# Patient Record
Sex: FEMALE | Race: ASIAN | NOT HISPANIC OR LATINO | Employment: OTHER | URBAN - METROPOLITAN AREA
[De-identification: names, ages, dates, MRNs, and addresses within clinical notes are randomized per-mention and may not be internally consistent; named-entity substitution may affect disease eponyms.]

---

## 2017-05-12 ENCOUNTER — TRANSCRIBE ORDERS (OUTPATIENT)
Dept: LAB | Facility: HOSPITAL | Age: 77
End: 2017-05-12

## 2017-05-12 ENCOUNTER — APPOINTMENT (OUTPATIENT)
Dept: LAB | Facility: HOSPITAL | Age: 77
End: 2017-05-12
Attending: INTERNAL MEDICINE
Payer: MEDICARE

## 2017-05-12 DIAGNOSIS — E55.9 UNSPECIFIED VITAMIN D DEFICIENCY: ICD-10-CM

## 2017-05-12 DIAGNOSIS — I10 ESSENTIAL HYPERTENSION, BENIGN: Primary | ICD-10-CM

## 2017-05-12 DIAGNOSIS — D51.9 ANEMIA DUE TO VITAMIN B12 DEFICIENCY, UNSPECIFIED B12 DEFICIENCY TYPE: ICD-10-CM

## 2017-05-12 DIAGNOSIS — E03.9 UNSPECIFIED HYPOTHYROIDISM: ICD-10-CM

## 2017-05-12 DIAGNOSIS — I10 ESSENTIAL HYPERTENSION, BENIGN: ICD-10-CM

## 2017-05-12 DIAGNOSIS — E78.5 OTHER AND UNSPECIFIED HYPERLIPIDEMIA: ICD-10-CM

## 2017-05-12 DIAGNOSIS — Z00.00 ROUTINE GENERAL MEDICAL EXAMINATION AT A HEALTH CARE FACILITY: ICD-10-CM

## 2017-05-12 LAB
ALBUMIN SERPL BCP-MCNC: 3.5 G/DL (ref 3.5–5)
ALP SERPL-CCNC: 69 U/L (ref 46–116)
ALT SERPL W P-5'-P-CCNC: 18 U/L (ref 12–78)
ANION GAP SERPL CALCULATED.3IONS-SCNC: 7 MMOL/L (ref 4–13)
AST SERPL W P-5'-P-CCNC: 10 U/L (ref 5–45)
BACTERIA UR QL AUTO: NORMAL /HPF
BASOPHILS # BLD AUTO: 0.05 THOUSANDS/ΜL (ref 0–0.1)
BASOPHILS NFR BLD AUTO: 1 % (ref 0–1)
BILIRUB SERPL-MCNC: 0.4 MG/DL (ref 0.2–1)
BILIRUB UR QL STRIP: NEGATIVE
BUN SERPL-MCNC: 13 MG/DL (ref 5–25)
CALCIUM SERPL-MCNC: 8.8 MG/DL (ref 8.3–10.1)
CHLORIDE SERPL-SCNC: 107 MMOL/L (ref 100–108)
CHOLEST SERPL-MCNC: 256 MG/DL (ref 50–200)
CLARITY UR: CLEAR
CO2 SERPL-SCNC: 26 MMOL/L (ref 21–32)
COLOR UR: YELLOW
CREAT SERPL-MCNC: 0.72 MG/DL (ref 0.6–1.3)
EOSINOPHIL # BLD AUTO: 0.12 THOUSAND/ΜL (ref 0–0.61)
EOSINOPHIL NFR BLD AUTO: 3 % (ref 0–6)
ERYTHROCYTE [DISTWIDTH] IN BLOOD BY AUTOMATED COUNT: 13 % (ref 11.6–15.1)
GFR SERPL CREATININE-BSD FRML MDRD: >60 ML/MIN/1.73SQ M
GLUCOSE P FAST SERPL-MCNC: 108 MG/DL (ref 65–99)
GLUCOSE UR STRIP-MCNC: NEGATIVE MG/DL
HCT VFR BLD AUTO: 39.6 % (ref 34.8–46.1)
HDLC SERPL-MCNC: 67 MG/DL (ref 40–60)
HGB BLD-MCNC: 13.1 G/DL (ref 11.5–15.4)
HGB UR QL STRIP.AUTO: ABNORMAL
HYALINE CASTS #/AREA URNS LPF: NORMAL /LPF
KETONES UR STRIP-MCNC: NEGATIVE MG/DL
LDLC SERPL CALC-MCNC: 152 MG/DL (ref 0–100)
LEUKOCYTE ESTERASE UR QL STRIP: NEGATIVE
LYMPHOCYTES # BLD AUTO: 1.99 THOUSANDS/ΜL (ref 0.6–4.47)
LYMPHOCYTES NFR BLD AUTO: 44 % (ref 14–44)
MCH RBC QN AUTO: 32.3 PG (ref 26.8–34.3)
MCHC RBC AUTO-ENTMCNC: 33.1 G/DL (ref 31.4–37.4)
MCV RBC AUTO: 98 FL (ref 82–98)
MONOCYTES # BLD AUTO: 0.25 THOUSAND/ΜL (ref 0.17–1.22)
MONOCYTES NFR BLD AUTO: 6 % (ref 4–12)
NEUTROPHILS # BLD AUTO: 2.14 THOUSANDS/ΜL (ref 1.85–7.62)
NEUTS SEG NFR BLD AUTO: 46 % (ref 43–75)
NITRITE UR QL STRIP: NEGATIVE
NON-SQ EPI CELLS URNS QL MICRO: NORMAL /HPF
NRBC BLD AUTO-RTO: 0 /100 WBCS
PH UR STRIP.AUTO: 6 [PH] (ref 4.5–8)
PLATELET # BLD AUTO: 227 THOUSANDS/UL (ref 149–390)
PMV BLD AUTO: 10.1 FL (ref 8.9–12.7)
POTASSIUM SERPL-SCNC: 4.3 MMOL/L (ref 3.5–5.3)
PROT SERPL-MCNC: 7.4 G/DL (ref 6.4–8.2)
PROT UR STRIP-MCNC: NEGATIVE MG/DL
RBC # BLD AUTO: 4.06 MILLION/UL (ref 3.81–5.12)
RBC #/AREA URNS AUTO: NORMAL /HPF
SODIUM SERPL-SCNC: 140 MMOL/L (ref 136–145)
SP GR UR STRIP.AUTO: 1.01 (ref 1–1.03)
T4 FREE SERPL-MCNC: 1.13 NG/DL (ref 0.76–1.46)
TRIGL SERPL-MCNC: 186 MG/DL
TSH SERPL DL<=0.05 MIU/L-ACNC: 2.11 UIU/ML (ref 0.36–3.74)
UROBILINOGEN UR QL STRIP.AUTO: 0.2 E.U./DL
WBC # BLD AUTO: 4.56 THOUSAND/UL (ref 4.31–10.16)
WBC #/AREA URNS AUTO: NORMAL /HPF

## 2017-05-12 PROCEDURE — 36415 COLL VENOUS BLD VENIPUNCTURE: CPT

## 2017-05-12 PROCEDURE — 81001 URINALYSIS AUTO W/SCOPE: CPT | Performed by: INTERNAL MEDICINE

## 2017-05-12 PROCEDURE — 80061 LIPID PANEL: CPT

## 2017-05-12 PROCEDURE — 84443 ASSAY THYROID STIM HORMONE: CPT

## 2017-05-12 PROCEDURE — 80053 COMPREHEN METABOLIC PANEL: CPT

## 2017-05-12 PROCEDURE — 85025 COMPLETE CBC W/AUTO DIFF WBC: CPT

## 2017-05-12 PROCEDURE — 84439 ASSAY OF FREE THYROXINE: CPT

## 2017-08-10 ENCOUNTER — TRANSCRIBE ORDERS (OUTPATIENT)
Dept: ADMINISTRATIVE | Facility: HOSPITAL | Age: 77
End: 2017-08-10

## 2017-08-10 DIAGNOSIS — Z00.00 ROUTINE ADULT HEALTH MAINTENANCE: Primary | ICD-10-CM

## 2017-09-13 ENCOUNTER — HOSPITAL ENCOUNTER (OUTPATIENT)
Dept: RADIOLOGY | Facility: HOSPITAL | Age: 77
Discharge: HOME | End: 2017-09-13
Attending: INTERNAL MEDICINE
Payer: MEDICARE

## 2017-09-13 DIAGNOSIS — Z00.00 ROUTINE ADULT HEALTH MAINTENANCE: ICD-10-CM

## 2017-09-13 PROCEDURE — G0202 SCR MAMMO BI INCL CAD: HCPCS

## 2018-01-19 ENCOUNTER — APPOINTMENT (OUTPATIENT)
Dept: NON INVASIVE DIAGNOSTICS | Facility: HOSPITAL | Age: 78
End: 2018-01-19
Payer: MEDICARE

## 2018-01-19 ENCOUNTER — HOSPITAL ENCOUNTER (OUTPATIENT)
Facility: HOSPITAL | Age: 78
Setting detail: OBSERVATION
Discharge: HOME | End: 2018-01-20
Attending: EMERGENCY MEDICINE | Admitting: INTERNAL MEDICINE
Payer: MEDICARE

## 2018-01-19 ENCOUNTER — APPOINTMENT (EMERGENCY)
Dept: RADIOLOGY | Facility: HOSPITAL | Age: 78
End: 2018-01-19
Payer: MEDICARE

## 2018-01-19 DIAGNOSIS — R00.1 BRADYCARDIA: Primary | ICD-10-CM

## 2018-01-19 PROBLEM — I10 HYPERTENSION: Status: ACTIVE | Noted: 2018-01-19

## 2018-01-19 PROBLEM — E78.5 HYPERLIPEMIA: Status: ACTIVE | Noted: 2018-01-19

## 2018-01-19 PROBLEM — R42 DIZZINESS: Status: ACTIVE | Noted: 2018-01-19

## 2018-01-19 PROBLEM — F41.9 ANXIETY: Chronic | Status: ACTIVE | Noted: 2018-01-19

## 2018-01-19 LAB
ALBUMIN SERPL BCP-MCNC: 3.7 G/DL (ref 3.5–5)
ALP SERPL-CCNC: 65 U/L (ref 46–116)
ALT SERPL W P-5'-P-CCNC: 24 U/L (ref 12–78)
ANION GAP SERPL CALCULATED.3IONS-SCNC: 8 MMOL/L (ref 4–13)
AST SERPL W P-5'-P-CCNC: 22 U/L (ref 5–45)
ATRIAL RATE: 50 BPM
BACTERIA UR QL AUTO: NORMAL /HPF
BASOPHILS # BLD AUTO: 0.04 THOUSANDS/ΜL (ref 0–0.1)
BASOPHILS NFR BLD AUTO: 1 % (ref 0–1)
BILIRUB SERPL-MCNC: 0.37 MG/DL (ref 0.2–1)
BILIRUB UR QL STRIP: NEGATIVE
BUN SERPL-MCNC: 16 MG/DL (ref 5–25)
CALCIUM SERPL-MCNC: 9.3 MG/DL (ref 8.3–10.1)
CHLORIDE SERPL-SCNC: 107 MMOL/L (ref 100–108)
CLARITY UR: CLEAR
CO2 SERPL-SCNC: 23 MMOL/L (ref 21–32)
COLOR UR: YELLOW
COLOR, POC: NORMAL
CREAT SERPL-MCNC: 0.74 MG/DL (ref 0.6–1.3)
EOSINOPHIL # BLD AUTO: 0.12 THOUSAND/ΜL (ref 0–0.61)
EOSINOPHIL NFR BLD AUTO: 2 % (ref 0–6)
ERYTHROCYTE [DISTWIDTH] IN BLOOD BY AUTOMATED COUNT: 13.3 % (ref 11.6–15.1)
GFR SERPL CREATININE-BSD FRML MDRD: 78 ML/MIN/1.73SQ M
GLUCOSE SERPL-MCNC: 105 MG/DL (ref 65–140)
GLUCOSE UR STRIP-MCNC: NEGATIVE MG/DL
HCT VFR BLD AUTO: 40.5 % (ref 34.8–46.1)
HGB BLD-MCNC: 13.8 G/DL (ref 11.5–15.4)
HGB UR QL STRIP.AUTO: ABNORMAL
HYALINE CASTS #/AREA URNS LPF: NORMAL /LPF
KETONES UR STRIP-MCNC: NEGATIVE MG/DL
LEUKOCYTE ESTERASE UR QL STRIP: NEGATIVE
LYMPHOCYTES # BLD AUTO: 3.68 THOUSANDS/ΜL (ref 0.6–4.47)
LYMPHOCYTES NFR BLD AUTO: 51 % (ref 14–44)
MCH RBC QN AUTO: 32.3 PG (ref 26.8–34.3)
MCHC RBC AUTO-ENTMCNC: 34.1 G/DL (ref 31.4–37.4)
MCV RBC AUTO: 95 FL (ref 82–98)
MONOCYTES # BLD AUTO: 0.43 THOUSAND/ΜL (ref 0.17–1.22)
MONOCYTES NFR BLD AUTO: 6 % (ref 4–12)
NEUTROPHILS # BLD AUTO: 2.86 THOUSANDS/ΜL (ref 1.85–7.62)
NEUTS SEG NFR BLD AUTO: 40 % (ref 43–75)
NITRITE UR QL STRIP: NEGATIVE
NON-SQ EPI CELLS URNS QL MICRO: NORMAL /HPF
NRBC BLD AUTO-RTO: 0 /100 WBCS
P AXIS: 52 DEGREES
PH UR STRIP.AUTO: 5.5 [PH] (ref 4.5–8)
PLATELET # BLD AUTO: 243 THOUSANDS/UL (ref 149–390)
PMV BLD AUTO: 9.7 FL (ref 8.9–12.7)
POTASSIUM SERPL-SCNC: 4.4 MMOL/L (ref 3.5–5.3)
PR INTERVAL: 160 MS
PROT SERPL-MCNC: 8.1 G/DL (ref 6.4–8.2)
PROT UR STRIP-MCNC: NEGATIVE MG/DL
QRS AXIS: 62 DEGREES
QRSD INTERVAL: 84 MS
QT INTERVAL: 460 MS
QTC INTERVAL: 419 MS
RBC # BLD AUTO: 4.27 MILLION/UL (ref 3.81–5.12)
RBC #/AREA URNS AUTO: NORMAL /HPF
SODIUM SERPL-SCNC: 138 MMOL/L (ref 136–145)
SP GR UR STRIP.AUTO: 1.01 (ref 1–1.03)
T WAVE AXIS: 48 DEGREES
TROPONIN I SERPL-MCNC: <0.02 NG/ML
UROBILINOGEN UR QL STRIP.AUTO: 0.2 E.U./DL
VENTRICULAR RATE: 50 BPM
WBC # BLD AUTO: 7.14 THOUSAND/UL (ref 4.31–10.16)
WBC #/AREA URNS AUTO: NORMAL /HPF

## 2018-01-19 PROCEDURE — 71046 X-RAY EXAM CHEST 2 VIEWS: CPT

## 2018-01-19 PROCEDURE — 81002 URINALYSIS NONAUTO W/O SCOPE: CPT | Performed by: EMERGENCY MEDICINE

## 2018-01-19 PROCEDURE — 93306 TTE W/DOPPLER COMPLETE: CPT

## 2018-01-19 PROCEDURE — 99285 EMERGENCY DEPT VISIT HI MDM: CPT

## 2018-01-19 PROCEDURE — 85025 COMPLETE CBC W/AUTO DIFF WBC: CPT | Performed by: EMERGENCY MEDICINE

## 2018-01-19 PROCEDURE — 36415 COLL VENOUS BLD VENIPUNCTURE: CPT

## 2018-01-19 PROCEDURE — 93005 ELECTROCARDIOGRAM TRACING: CPT

## 2018-01-19 PROCEDURE — 84484 ASSAY OF TROPONIN QUANT: CPT | Performed by: INTERNAL MEDICINE

## 2018-01-19 PROCEDURE — 84484 ASSAY OF TROPONIN QUANT: CPT | Performed by: EMERGENCY MEDICINE

## 2018-01-19 PROCEDURE — 81001 URINALYSIS AUTO W/SCOPE: CPT

## 2018-01-19 PROCEDURE — 80053 COMPREHEN METABOLIC PANEL: CPT | Performed by: EMERGENCY MEDICINE

## 2018-01-19 RX ORDER — ASPIRIN 81 MG/1
81 TABLET, CHEWABLE ORAL DAILY
Status: DISCONTINUED | OUTPATIENT
Start: 2018-01-19 | End: 2018-01-20

## 2018-01-19 RX ORDER — ATORVASTATIN CALCIUM 40 MG/1
40 TABLET, FILM COATED ORAL
Status: DISCONTINUED | OUTPATIENT
Start: 2018-01-19 | End: 2018-01-20 | Stop reason: HOSPADM

## 2018-01-19 RX ORDER — LANOLIN ALCOHOL/MO/W.PET/CERES
6 CREAM (GRAM) TOPICAL ONCE
Status: COMPLETED | OUTPATIENT
Start: 2018-01-19 | End: 2018-01-19

## 2018-01-19 RX ORDER — HYDRALAZINE HYDROCHLORIDE 20 MG/ML
5 INJECTION INTRAMUSCULAR; INTRAVENOUS ONCE
Status: COMPLETED | OUTPATIENT
Start: 2018-01-19 | End: 2018-01-19

## 2018-01-19 RX ORDER — ACETAMINOPHEN 325 MG/1
650 TABLET ORAL EVERY 6 HOURS PRN
Status: DISCONTINUED | OUTPATIENT
Start: 2018-01-19 | End: 2018-01-20 | Stop reason: HOSPADM

## 2018-01-19 RX ADMIN — HYDRALAZINE HYDROCHLORIDE 5 MG: 20 INJECTION INTRAMUSCULAR; INTRAVENOUS at 14:35

## 2018-01-19 RX ADMIN — MELATONIN TAB 3 MG 6 MG: 3 TAB at 21:00

## 2018-01-19 RX ADMIN — ATORVASTATIN CALCIUM 40 MG: 40 TABLET, FILM COATED ORAL at 16:44

## 2018-01-19 RX ADMIN — ACETAMINOPHEN 650 MG: 325 TABLET, FILM COATED ORAL at 20:59

## 2018-01-19 RX ADMIN — ASPIRIN 81 MG 81 MG: 81 TABLET ORAL at 15:31

## 2018-01-19 NOTE — ED ATTENDING ATTESTATION
Mayra Berrios MD, saw and evaluated the patient  I have discussed the patient with the resident/non-physician practitioner and agree with the resident's/non-physician practitioner's findings, Plan of Care, and MDM as documented in the resident's/non-physician practitioner's note, except where noted  All available labs and Radiology studies were reviewed  At this point I agree with the current assessment done in the Emergency Department  I have conducted an independent evaluation of this patient including a focused history and a physical exam     71-year-old female, presenting to the emergency department for evaluation of lightheaded type dizziness  Patient has had several episodes  Seem to be postural   No associated chest pain, cough, shortness of breath or palpitations  No black or bloody stools  No abdominal pain  Ten systems reviewed negative except as noted in the history of present illness  The patient is resting comfortably on a stretcher in no acute respiratory distress  The patient appears nontoxic  HEENT reveals moist mucous membranes  Head is normocephalic and atraumatic  Conjunctiva and sclera are normal  Neck is nontender and supple with full range of motion to flexion, extension, lateral rotation  No meningismus appreciated  No masses are appreciated  Lungs are clear to auscultation bilaterally without any wheezes, rales or rhonchi  Heart is bradycardic without any murmurs rubs or gallops  Abdomen is soft and nontender without any rebound or guarding  Extremities appear grossly normal without any significant arthropathy  Patient is awake, alert, and oriented x3  The patient has normal interaction  Motor is 5 out of 5  Postural hypotension associated with bradycardia  Unclear whether the bradycardia represents the patient's baseline or is a new or acute finding  Given that the patient is symptomatic intermittently with it, and has new inferior EKG changes    Patient will be evaluated for acute coronary syndrome as well as symptomatic bradycardia      Labs Reviewed   CBC AND DIFFERENTIAL - Abnormal        Result Value Ref Range Status    WBC 7 14  4 31 - 10 16 Thousand/uL Final    RBC 4 27  3 81 - 5 12 Million/uL Final    Hemoglobin 13 8  11 5 - 15 4 g/dL Final    Hematocrit 40 5  34 8 - 46 1 % Final    MCV 95  82 - 98 fL Final    MCH 32 3  26 8 - 34 3 pg Final    MCHC 34 1  31 4 - 37 4 g/dL Final    RDW 13 3  11 6 - 15 1 % Final    MPV 9 7  8 9 - 12 7 fL Final    Platelets 013  230 - 390 Thousands/uL Final    nRBC 0  /100 WBCs Final    Neutrophils Relative 40 (*) 43 - 75 % Final    Lymphocytes Relative 51 (*) 14 - 44 % Final    Monocytes Relative 6  4 - 12 % Final    Eosinophils Relative 2  0 - 6 % Final    Basophils Relative 1  0 - 1 % Final    Neutrophils Absolute 2 86  1 85 - 7 62 Thousands/µL Final    Lymphocytes Absolute 3 68  0 60 - 4 47 Thousands/µL Final    Monocytes Absolute 0 43  0 17 - 1 22 Thousand/µL Final    Eosinophils Absolute 0 12  0 00 - 0 61 Thousand/µL Final    Basophils Absolute 0 04  0 00 - 0 10 Thousands/µL Final   ED URINE MACROSCOPIC - Abnormal     Color, UA Yellow   Final    Clarity, UA Clear   Final    pH, UA 5 5  4 5 - 8 0 Final    Leukocytes, UA Negative  Negative Final    Nitrite, UA Negative  Negative Final    Protein, UA Negative  Negative mg/dl Final    Glucose, UA Negative  Negative mg/dl Final    Ketones, UA Negative  Negative mg/dl Final    Urobilinogen, UA 0 2  0 2, 1 0 E U /dl E U /dl Final    Bilirubin, UA Negative  Negative Final    Blood, UA Small (*) Negative Final    Specific [place], UA 1 010  1 003 - 1 030 Final    Narrative:     CLINITEK RESULT   TROPONIN I - Normal    Troponin I <0 02  <=0 04 ng/mL Final    Narrative:     [employer] Chemistry analyzer 99% cutoff is > 0 04 ng/mL in network labs    o cTnI 99% cutoff is useful only when applied to patients in the clinical setting of myocardial ischemia  o cTnI 99% cutoff should be interpreted in the context of clinical history, ECG findings and possibly cardiac imaging to establish correct diagnosis  o cTnI 99% cutoff may be suggestive but clearly not indicative of a coronary event without the clinical setting of myocardial ischemia  URINE MICROSCOPIC - Normal    RBC, UA None Seen  None Seen, 0-5 /hpf Final    WBC, UA None Seen  None Seen, 0-5, 5-55, 5-65 /hpf Final    Epithelial Cells None Seen  None Seen, Occasional /hpf Final    Bacteria, UA None Seen  None Seen, Occasional /hpf Final    Hyaline Casts, UA None Seen  None Seen /lpf Final   POCT URINALYSIS DIPSTICK - Normal    Color, UA see chart   Final   COMPREHENSIVE METABOLIC PANEL    Sodium 134  136 - 145 mmol/L Final    Potassium 4 4  3 5 - 5 3 mmol/L Final    Chloride 107  100 - 108 mmol/L Final    CO2 23  21 - 32 mmol/L Final    Anion Gap 8  4 - 13 mmol/L Final    BUN 16  5 - 25 mg/dL Final    Creatinine 0 74  0 60 - 1 30 mg/dL Final    Comment: Standardized to IDMS reference method    Glucose 105  65 - 140 mg/dL Final    Comment:   If the patient is fasting, the ADA then defines impaired fasting glucose as > 100 mg/dL and diabetes as > or equal to 123 mg/dL  Specimen collection should occur prior to Sulfasalazine administration due to the potential for falsely depressed results  Specimen collection should occur prior to Sulfapyridine administration due to the potential for falsely elevated results  Calcium 9 3  8 3 - 10 1 mg/dL Final    AST 22  5 - 45 U/L Final    Comment:   Specimen collection should occur prior to Sulfasalazine administration due to the potential for falsely depressed results  ALT 24  12 - 78 U/L Final    Comment:   Specimen collection should occur prior to Sulfasalazine and/or Sulfapyridine administration due to the potential for falsely depressed results       Alkaline Phosphatase 65  46 - 116 U/L Final    Total Protein 8 1  6 4 - 8 2 g/dL Final    Albumin 3 7  3 5 - 5 0 g/dL Final    Total Bilirubin 0 37  0 20 - 1 00 mg/dL Final    eGFR 78  ml/min/1 73sq m Final    Narrative:     National Kidney Disease Education Program recommendations are as follows:  GFR calculation is accurate only with a steady state creatinine  Chronic Kidney disease less than 60 ml/min/1 73 sq  meters  Kidney failure less than 15 ml/min/1 73 sq  meters  X-ray chest 2 views   Final Result      No active pulmonary disease           Workstation performed: [id number]

## 2018-01-19 NOTE — H&P
INTERNAL MEDICINE HISTORY AND PHYSICAL  ED 23 SOD Team B     NAME: Rd Colin  AGE: 65 y o  SEX: female  : 1940   MRN: [id number]  ENCOUNTER: [id number]    DATE: 2018  TIME: 1:07 PM    Primary Care Physician: Mayda Grider MD  Admitting Provider: Mireya Lester MD    Chief complaint: "Not feeling right"    History of Present Illness     Rd Colin is a 65 y o  female with past medical history of hypertension who presented to [facility] ED with complaint of lightheadedness from home  Blue phone was used to conduct this interview  History is also obtained from patient, chart review and pt's PCP office  Pt reports that yesterday she felt fine, she went to the salon and had some errands to run, she states that at some point she felt "not right" she denied feeling lightheaded or feeling like she was going to pass out  She reports that she only felt like something was slightly not right  She reports that this feeling resolved on it own  She reports that this morning she as walking around her apartment when she felt the same way again  She reports that this morning she also felt like something was not right so she called EMS  Pt reports that she did take her BP medications this morning  She reports that on EMS initially her SBP was 195  She denied chest pain, sob  At this time pt is bradycardiac however she is completely asymptomatic and reports that she feels at baseline  Pt denied any cardiac hx other then mitral insufficiency non rheumatic, she denied any congential heart disease  Pt reoprts that she is able to walk a lot without having any chest pain, sob, lightheadedness  It appears that pt has hx of bradycardia  Pt had a stress test in , her baseline HR at that time was 46, the stress test was negative for ischemia  Call was also placed to pt's cardiologist Dr Anastasia Pabon however because their all scripts is down much information was not obtained       Upon arrival vitals: T 97 5, P 53, R 18, BP 203/88, SpO2 99% RA  EKG showed HR 50, T wave abnormalities, anterolateral ischemia and some nonspecific T wave abnormality now evident in Inferior leads  Initial troponin was negative, labs and UA were negative  CXR showed no active pulmonary disease  Review of Systems   Review of Systems   Constitutional: Positive for fatigue  Negative for appetite change and chills  HENT: Negative for sinus pain, sinus pressure and sneezing  Eyes: Negative for photophobia and visual disturbance  Respiratory: Negative for cough, chest tightness, shortness of breath and wheezing  Cardiovascular: Negative for chest pain, palpitations and leg swelling  Gastrointestinal: Negative for abdominal pain, constipation, diarrhea, nausea and vomiting  Genitourinary: Negative for difficulty urinating and dysuria  Musculoskeletal: Negative for back pain  Neurological: Negative for facial asymmetry, light-headedness and numbness  Past Medical History     Past Medical History:   Diagnosis Date    Hypertension        Past Surgical History   History reviewed  No pertinent surgical history  Social History     History   Alcohol Use No     History   Drug Use No     History   Smoking Status    Never Smoker   Smokeless Tobacco    Not on file       Family History   History reviewed  No pertinent family history  Medications Prior to Admission     Prior to Admission medications    Medication Sig Start Date End Date Taking?  Authorizing Provider   Amlodipine-Olmesartan (EM PO) Take 25 mg by mouth daily   Yes Historical Provider, MD   NON FORMULARY Azur 25 mg daily  1/19/18 Yes Historical Provider, MD       Allergies   No Known Allergies    Objective     Vitals:    01/19/18 1052 01/19/18 1054 01/19/18 1130 01/19/18 1234   BP: (!) 182/79  162/73 (!) 158/101   BP Location:   Right arm Left arm   Pulse: (!) 51  (!) 48 (!) 48   Resp: 18  20 18   Temp:  97 5 °F (36 4 °C)     TempSrc:  Tympanic     SpO2: 99%  97% 98%   Weight: 65 3 kg (144 lb)      There is no height or weight on file to calculate BMI  No intake or output data in the 24 hours ending 01/19/18 1307  Invasive Devices     Peripheral Intravenous Line            Peripheral IV 01/19/18 Left Hand less than 1 day              Physical Exam   Constitutional: She is oriented to person, place, and time  She appears well-developed and well-nourished  No distress  HENT:   Head: Normocephalic and atraumatic  Eyes: Conjunctivae are normal  Right eye exhibits no discharge  Left eye exhibits no discharge  Neck: Neck supple  No JVD present  Cardiovascular: Normal rate and regular rhythm  Pulmonary/Chest: No respiratory distress  She has no wheezes  Abdominal: Soft  She exhibits no distension  There is no tenderness  There is no rebound  Musculoskeletal: Normal range of motion  She exhibits no edema  Neurological: She is alert and oriented to person, place, and time  No cranial nerve deficit  Skin: Skin is warm and dry  No rash noted  She is not diaphoretic  No erythema  Lab Results: I have personally reviewed pertinent reports      CBC:   Results from last 7 days  Lab Units 01/19/18  1044   WBC Thousand/uL 7 14   RBC Million/uL 4 27   HEMOGLOBIN g/dL 13 8   HEMATOCRIT % 40 5   MCV fL 95   MCH pg 32 3   MCHC g/dL 34 1   RDW % 13 3   MPV fL 9 7   PLATELETS Thousands/uL 243   NRBC AUTO /100 WBCs 0   NEUTROS PCT % 40*   LYMPHS PCT % 51*   MONOS PCT % 6   EOS PCT % 2   BASOS PCT % 1   NEUTROS ABS Thousands/µL 2 86   LYMPHS ABS Thousands/µL 3 68   MONOS ABS Thousand/µL 0 43   EOS ABS Thousand/µL 0 12   , Chemistry Profile:   Results from last 7 days  Lab Units 01/19/18  1044   SODIUM mmol/L 138   POTASSIUM mmol/L 4 4   CHLORIDE mmol/L 107   CO2 mmol/L 23   ANION GAP mmol/L 8   BUN mg/dL 16   CREATININE mg/dL 0 74   GLUCOSE RANDOM mg/dL 105   CALCIUM mg/dL 9 3   AST U/L 22   ALT U/L 24   ALK PHOS U/L 65   TOTAL PROTEIN g/dL 8 1   ALBUMIN g/dL 3 7   BILIRUBIN TOTAL mg/dL 0 37   EGFR ml/min/1 73sq m 78       Imaging: I have personally reviewed pertinent films in PACS    X-ray Chest 2 Views  Result Date: 1/19/2018  Narrative: CHEST - DUAL ENERGY INDICATION:  chest pain  History taken directly from the electronic ordering system  COMPARISON:  5/12/2014 VIEWS:  PA (including soft tissue/bone algorithms) and lateral projections IMAGES:  4 FINDINGS:     Heart shadow is enlarged but unchanged from prior exam  The lungs are clear  No pneumothorax or pleural effusion  Visualized osseous structures appear within normal limits for the patient's age  Stable mild compression deformity at the thoracolumbar junction  Impression: No active pulmonary disease  Urinalysis:   Results from last 7 days  Lab Units 01/19/18  1240   COLOR UA  Yellow   CLARITY UA  Clear   SPEC GRAV UA  1 010   PH UA  5 5   LEUKOCYTES UA  Negative   NITRITE UA  Negative   PROTEIN UA mg/dl Negative   GLUCOSE UA mg/dl Negative   KETONES UA mg/dl Negative   BILIRUBIN UA  Negative   BLOOD UA  Small*        Urine Micro:   Results from last 7 days  Lab Units 01/19/18  1240   RBC UA /hpf None Seen   WBC UA /hpf None Seen   EPITHELIAL CELLS WET PREP /hpf None Seen   BACTERIA UA /hpf None Seen        EKG, Pathology, and Other Studies: I have personally reviewed pertinent reports      Assessment and Plan   Malaise likely accelerated HTN   -Symptoms now resolved     Asymptomatic Sinus Bradycardia   -EKG showed HR 50, T wave abnormalities, anterolateral ischemia and some nonspecific T wave abnormality now evident in Inferior leads   -Will monitor on telemetry   -Will get an ECHO    Accelerated hypertensive   -Initially elevated on arrival, pt self corrected to 182/76 and then elevated to 191/80   -One dose of hydralazine 5mg given and BP now 147/67   -Continue home meds   -Will monitor     Hyperlipidemia   -Continue Crestor     Code Status: Level 1 - Full Code  VTE Pharmacologic Prophylaxis: None, low risk  VTE Mechanical Prophylaxis: sequential compression device  Admission Status: OBSERVATION    Admission Time  I spent 45 minutes admitting the patient  This involved direct patient contact where I performed a full history and physical, reviewing previous records, and reviewing laboratory and other diagnostic studies      Alisha Encarnacion DO  Internal Medicine  PGY-2

## 2018-01-19 NOTE — ED PROVIDER NOTES
History  Chief Complaint   Patient presents with    Dizziness     pt reports sudden onset of dizziness which started after breakfast  Pt also states she has L sided chest pain, which she thinks is related to gas   Chest Pain     HPI   64 yo female presenting with lightheadedness and left sided chest pain  Yesterday woke up and had breakfast, was initially feeling fine and went to the [facility]  At the [facility], patient felt lightheaded and not herself  This resolved within a couple minutes  She left in walked home and felt fine  Today, patient woke up and said that she felt lightheaded again, again feeling unwell  She denied any chest pain shortness of breath, no palpitations, she did not pass out  Called PCP to get appt, but unable to, so she talked to management who called 911  She went home and then felt the same, but then management called 911  The patient says she had some cold symptoms about a month ago and took some antibiotics  Otherwise, no recent illnesses in the past week  The patient said that she had similar feeling about 30 years ago, does not remember anything about the event  She denies headache, no lightheadedness now  She says that it is worse when she is standing up  No dizziness, no extremity numbness or tingling, no weakness, no neck pain or back pain  She denies any abdominal pain, no nausea, no vomiting, no urinary symptoms  PMH: HTN  Patient denies smoking, drinking drug use      Prior to Admission Medications   Prescriptions Last Dose Informant Patient Reported? Taking?    Amlodipine-Olmesartan (EM PO)   Yes Yes   Sig: Take 25 mg by mouth daily   aspirin (ECOTRIN LOW STRENGTH) 81 mg EC tablet   Yes Yes   Sig: Take 81 mg by mouth daily   citalopram (CeleXA) 20 mg tablet   Yes Yes   Sig: Take 10 mg by mouth daily   rosuvastatin (CRESTOR) 40 MG tablet   Yes Yes   Sig: Take 40 mg by mouth daily      Facility-Administered Medications: None       Past Medical History:   Diagnosis Date    Hypertension        History reviewed  No pertinent surgical history  History reviewed  No pertinent family history  I have reviewed and agree with the history as documented  Social History   Substance Use Topics    Smoking status: Never Smoker    Smokeless tobacco: Not on file    Alcohol use No        Review of Systems    Constitutional: Negative for appetite change, chills and fever  HENT: Negative for congestion, rhinorrhea and sore throat  Eyes: Negative for photophobia, pain and visual disturbance  Respiratory: Negative for cough, chest tightness and shortness of breath  Cardiovascular: Negative for chest pain, palpitations and leg swelling  Gastrointestinal: Negative for abdominal pain, diarrhea, nausea and vomiting  Genitourinary: Negative for dysuria, flank pain and hematuria  Musculoskeletal: Negative for back pain, neck pain and neck stiffness  Skin: Negative for color change, rash and wound  Neurological: Negative for dizziness, numbness and headaches  Positive for lightheadedness  All other systems reviewed and are negative        Physical Exam  ED Triage Vitals   Temperature Pulse Respirations Blood Pressure SpO2   01/19/18 1054 01/19/18 1041 01/19/18 1041 01/19/18 1041 01/19/18 1041   97 5 °F (36 4 °C) (!) 51 18 (!) 203/88 99 %      Temp Source Heart Rate Source Patient Position - Orthostatic VS BP Location FiO2 (%)   01/19/18 1054 01/19/18 1130 01/19/18 1130 01/19/18 1130 --   Tympanic Monitor Sitting Right arm       Pain Score       01/19/18 1041       3           Orthostatic Vital Signs  Vitals:    01/19/18 1630 01/19/18 1900 01/20/18 0028 01/20/18 0744   BP: 152/67 119/52 (!) 102/49 107/52   Pulse: 61 68 (!) 54 (!) 50   Patient Position - Orthostatic VS: Sitting Sitting Lying Lying       Physical Exam  /52 (BP Location: Right arm)   Pulse (!) 50   Temp 98 3 °F (36 8 °C) (Oral)   Resp 18   Ht 5' 2" (1 575 m)   Wt 64 4 kg (141 lb 15 6 oz)   SpO2 96% BMI 25 97 kg/m²     General Appearance:  Alert, cooperative, no distress, appears stated age   Head:  Normocephalic, without obvious abnormality, atraumatic   Eyes:  PERRL, conjunctiva/corneas clear, EOM's intact, no nystagmus seen with eye movement   Ears:  Normal TM's and external ear canals, both ears   Nose: Nares normal, septum midline,mucosa normal, no drainage or sinus tenderness   Throat: Lips, mucosa, and tongue normal; teeth and gums normal   Neck: Supple, symmetrical, trachea midline, no adenopathy   Back:   Symmetric, no curvature, ROM normal, no CVA tenderness   Lungs:   Clear to auscultation bilaterally, respirations unlabored   Heart:  Regular rate and rhythm, S1 and S2 normal, no murmur, rub, or gallop   Abdomen:   Soft, non-tender, bowel sounds active all four quadrants   Pelvic: Deferred   Extremities: Extremities normal, atraumatic, no cyanosis or edema   Pulses: 2+ and symmetric   Skin: Skin color, texture, turgor normal, no rashes or lesions   Neurologic:        Psychiatric: Moves all extremities, sensation and strength in tact in all extremities    5/5 strength in all extremities, finger to nose intact in BUE    Normal mood and affect       ED Medications  Medications   atorvastatin (LIPITOR) tablet 40 mg (40 mg Oral Given 1/19/18 1644)   amLODIPine (NORVASC) 5 mg, olmesartan (BENICAR) 20 mg ( Oral Not Given 1/19/18 1613)   aspirin chewable tablet 81 mg (81 mg Oral Given 1/20/18 0845)   acetaminophen (TYLENOL) tablet 650 mg (650 mg Oral Given 1/19/18 2059)   enoxaparin (LOVENOX) subcutaneous injection 40 mg (40 mg Subcutaneous Given 1/20/18 0845)   hydrALAZINE (APRESOLINE) injection 5 mg (5 mg Intravenous Given 1/19/18 1435)   melatonin tablet 6 mg (6 mg Oral Given 1/19/18 2100)       Diagnostic Studies  Results Reviewed     Procedure Component Value Units Date/Time    Troponin I [[id number]]  (Normal) Collected:  01/19/18 2216    Lab Status:  Final result Specimen:  Blood from Arm, Left Updated: 01/19/18 2300     Troponin I <0 02 ng/mL     Narrative:         Siemens Chemistry analyzer 99% cutoff is > 0 04 ng/mL in network labs    o cTnI 99% cutoff is useful only when applied to patients in the clinical setting of myocardial ischemia  o cTnI 99% cutoff should be interpreted in the context of clinical history, ECG findings and possibly cardiac imaging to establish correct diagnosis  o cTnI 99% cutoff may be suggestive but clearly not indicative of a coronary event without the clinical setting of myocardial ischemia  Troponin I [[id number]]  (Normal) Collected:  01/19/18 1532    Lab Status:  Final result Specimen:  Blood from Arm, Left Updated:  01/19/18 1641     Troponin I <0 02 ng/mL     Narrative:         Siemens Chemistry analyzer 99% cutoff is > 0 04 ng/mL in network labs    o cTnI 99% cutoff is useful only when applied to patients in the clinical setting of myocardial ischemia  o cTnI 99% cutoff should be interpreted in the context of clinical history, ECG findings and possibly cardiac imaging to establish correct diagnosis  o cTnI 99% cutoff may be suggestive but clearly not indicative of a coronary event without the clinical setting of myocardial ischemia      Troponin I [[id number]]     Lab Status:  No result Specimen:  Blood     Urine Microscopic [[id number]]  (Normal) Collected:  01/19/18 1240    Lab Status:  Final result Specimen:  Urine from Urine, Clean Catch Updated:  01/19/18 1251     RBC, UA None Seen /hpf      WBC, UA None Seen /hpf      Epithelial Cells None Seen /hpf      Bacteria, UA None Seen /hpf      Hyaline Casts, UA None Seen /lpf     POCT urinalysis dipstick [[id number]]  (Normal) Resulted:  01/19/18 1238    Lab Status:  Final result Specimen:  Urine Updated:  01/19/18 1238     Color, UA see chart    ED Urine Macroscopic [[id number]]  (Abnormal) Collected:  01/19/18 1240    Lab Status:  Final result Specimen:  Urine Updated:  01/19/18 1237     Color, UA Yellow     Clarity, UA Clear     pH, UA 5 5     Leukocytes, UA Negative     Nitrite, UA Negative     Protein, UA Negative mg/dl      Glucose, UA Negative mg/dl      Ketones, UA Negative mg/dl      Urobilinogen, UA 0 2 E U /dl      Bilirubin, UA Negative     Blood, UA Small (A)     Specific [place], UA 1 010    Narrative:       CLINITEK RESULT    Troponin I [[id number]]  (Normal) Collected:  01/19/18 1044    Lab Status:  Final result Specimen:  Blood from Arm, Left Updated:  01/19/18 1121     Troponin I <0 02 ng/mL     Narrative:         [employer] Chemistry analyzer 99% cutoff is > 0 04 ng/mL in network labs    o cTnI 99% cutoff is useful only when applied to patients in the clinical setting of myocardial ischemia  o cTnI 99% cutoff should be interpreted in the context of clinical history, ECG findings and possibly cardiac imaging to establish correct diagnosis  o cTnI 99% cutoff may be suggestive but clearly not indicative of a coronary event without the clinical setting of myocardial ischemia  Comprehensive metabolic panel [[id number]] Collected:  01/19/18 1044    Lab Status:  Final result Specimen:  Blood from Arm, Left Updated:  01/19/18 1115     Sodium 138 mmol/L      Potassium 4 4 mmol/L      Chloride 107 mmol/L      CO2 23 mmol/L      Anion Gap 8 mmol/L      BUN 16 mg/dL      Creatinine 0 74 mg/dL      Glucose 105 mg/dL      Calcium 9 3 mg/dL      AST 22 U/L      ALT 24 U/L      Alkaline Phosphatase 65 U/L      Total Protein 8 1 g/dL      Albumin 3 7 g/dL      Total Bilirubin 0 37 mg/dL      eGFR 78 ml/min/1 73sq m     Narrative:         National Kidney Disease Education Program recommendations are as follows:  GFR calculation is accurate only with a steady state creatinine  Chronic Kidney disease less than 60 ml/min/1 73 sq  meters  Kidney failure less than 15 ml/min/1 73 sq  meters      CBC and differential [[id number]]  (Abnormal) Collected:  01/19/18 1044    Lab Status:  Final result Specimen:  Blood from Arm, Left Updated:  01/19/18 1054     WBC 7 14 Thousand/uL      RBC 4 27 Million/uL      Hemoglobin 13 8 g/dL      Hematocrit 40 5 %      MCV 95 fL      MCH 32 3 pg      MCHC 34 1 g/dL      RDW 13 3 %      MPV 9 7 fL      Platelets 590 Thousands/uL      nRBC 0 /100 WBCs      Neutrophils Relative 40 (L) %      Lymphocytes Relative 51 (H) %      Monocytes Relative 6 %      Eosinophils Relative 2 %      Basophils Relative 1 %      Neutrophils Absolute 2 86 Thousands/µL      Lymphocytes Absolute 3 68 Thousands/µL      Monocytes Absolute 0 43 Thousand/µL      Eosinophils Absolute 0 12 Thousand/µL      Basophils Absolute 0 04 Thousands/µL                  X-ray chest 2 views   Final Result by Tonya Hancock MD (01/19 9357)      No active pulmonary disease  Workstation performed: [id number]               Procedures  ECG 12 Lead Documentation  Date/Time: 1/19/2018 10:56 AM  Performed by: Ambika Angel by: Sari Gamble     Indications / Diagnosis:  Dizziness  ECG reviewed by me, the ED Provider: yes    Patient location:  ED  Previous ECG:     Previous ECG:  Compared to current    Comparison ECG info:  Bradycardic before  Interpretation:     Interpretation: non-specific    Rate:     ECG rate:  50  Rhythm:     Rhythm: sinus bradycardia    Ectopy:     Ectopy: none    QRS:     QRS axis:  Normal    QRS intervals:  Normal  Conduction:     Conduction: normal    ST segments:     ST segments:  Normal  T waves:     T waves: non-specific      T waves comment:  Biphasic t waves in inferior leads            Phone Consults  ED Phone Contact    ED Course  ED Course            MDM   Patient with lightheadedness and feeling weak over the past few weeks  Will get cardiac work up, check electrolytes  EKG with subtle changes in inferior leads, biphasic t wave changes  In setting of bradycardia with these changes, will admit for cardiac obs to [facility] Time    Disposition  Final diagnoses:   Bradycardia     Time reflects when diagnosis was documented in both MDM as applicable and the Disposition within this note     Time User Action Codes Description Comment    1/19/2018 12:43 PM Marco Antonio Qureshi Add [R00 1] Bradycardia       ED Disposition     ED Disposition Condition Comment    Admit  Case was discussed with AP JUAREZ and the patient's admission status was agreed to be Admission Status: observation status to the service of Dr Ana Lilia Kerns  Follow-up Information    None       Current Discharge Medication List      CONTINUE these medications which have NOT CHANGED    Details   Amlodipine-Olmesartan (EM PO) Take 25 mg by mouth daily      aspirin (ECOTRIN LOW STRENGTH) 81 mg EC tablet Take 81 mg by mouth daily      citalopram (CeleXA) 20 mg tablet Take 10 mg by mouth daily      rosuvastatin (CRESTOR) 40 MG tablet Take 40 mg by mouth daily           No discharge procedures on file  ED Provider  Attending physically available and evaluated [facility] managed the patient along with the ED Attending      Electronically Signed by         Brent Acosta MD  01/20/18 4067

## 2018-01-19 NOTE — ED NOTES
Called x2 to [facility] regarding telemetry boxes  [facility] states they do not have any boxes  Hospital supervisor not answering phone at this time        Ashli Velazquez RN  01/19/18 3586

## 2018-01-20 VITALS
TEMPERATURE: 97.8 F | DIASTOLIC BLOOD PRESSURE: 58 MMHG | BODY MASS INDEX: 26.13 KG/M2 | RESPIRATION RATE: 18 BRPM | SYSTOLIC BLOOD PRESSURE: 110 MMHG | HEART RATE: 59 BPM | OXYGEN SATURATION: 97 % | HEIGHT: 62 IN | WEIGHT: 141.98 LBS

## 2018-01-20 LAB
ATRIAL RATE: 55 BPM
ATRIAL RATE: 56 BPM
P AXIS: 41 DEGREES
P AXIS: 66 DEGREES
PR INTERVAL: 140 MS
PR INTERVAL: 168 MS
QRS AXIS: 13 DEGREES
QRS AXIS: 64 DEGREES
QRSD INTERVAL: 92 MS
QRSD INTERVAL: 96 MS
QT INTERVAL: 474 MS
QT INTERVAL: 584 MS
QTC INTERVAL: 453 MS
QTC INTERVAL: 563 MS
T WAVE AXIS: 64 DEGREES
T WAVE AXIS: 66 DEGREES
VENTRICULAR RATE: 55 BPM
VENTRICULAR RATE: 56 BPM

## 2018-01-20 RX ORDER — ROSUVASTATIN CALCIUM 10 MG/1
40 TABLET, COATED ORAL DAILY
Status: DISCONTINUED | OUTPATIENT
Start: 2018-01-20 | End: 2018-01-20

## 2018-01-20 RX ORDER — CITALOPRAM 20 MG/1
10 TABLET ORAL DAILY
COMMUNITY
End: 2019-01-24 | Stop reason: SDUPTHER

## 2018-01-20 RX ORDER — ROSUVASTATIN CALCIUM 40 MG/1
40 TABLET, COATED ORAL DAILY
COMMUNITY
End: 2020-12-09

## 2018-01-20 RX ORDER — ASPIRIN 81 MG/1
81 TABLET ORAL DAILY
COMMUNITY

## 2018-01-20 RX ORDER — CITALOPRAM 10 MG/1
10 TABLET ORAL DAILY
Status: DISCONTINUED | OUTPATIENT
Start: 2018-01-20 | End: 2018-01-20

## 2018-01-20 RX ORDER — ASPIRIN 81 MG/1
81 TABLET ORAL DAILY
Status: DISCONTINUED | OUTPATIENT
Start: 2018-01-20 | End: 2018-01-20 | Stop reason: HOSPADM

## 2018-01-20 RX ADMIN — ENOXAPARIN SODIUM 40 MG: 40 INJECTION SUBCUTANEOUS at 08:45

## 2018-01-20 RX ADMIN — ASPIRIN 81 MG 81 MG: 81 TABLET ORAL at 08:45

## 2018-01-20 RX ADMIN — ASPIRIN 81 MG: 81 TABLET ORAL at 13:58

## 2018-01-20 NOTE — DISCHARGE SUMMARY
[facility] CENTRAL Discharge Summary - [address] [facility] 65 y o  female MRN: [id number]    [address] [facility] 7 Room / Bed: [facility] 721/[facility] 721-01 Encounter: [id number]    BRIEF OVERVIEW    Admitting Provider: Carli Arteaga MD  Discharge Provider: Carli Arteaga MD  Primary Care Physician at Discharge: BELKIS Winters MD    Discharge To:  home    Admission Date: 1/19/2018     Discharge Date: 1/20/2018    Primary Discharge Diagnosis  Principal Problem:    Bradycardia  Active Problems:    Hypertension    Hyperlipemia    Anxiety    Dizziness  Resolved Problems:    * No resolved hospital problems  *      Other Problems Addressed: none    Consulting Providers -  none     Therapeutic Operative Procedures Performed - none    Diagnostic Procedures Performed - Chest x ray - official reading pending    Discharge Disposition: Final discharge disposition not confirmed  Discharged With Lines: no    Test Results Pending at Discharge: Echo official reading pending    Outpatient Follow-Up - Patient will schedule follow up with Dr Mekhi Johnson in 1 week  none required  Follow up with consulting providers - Patient will schedule follow up appointment with Dr Traci Castro in 1 week  Active Issues Requiring Follow-up - none    Code Status: Level 1 - Full Code    Medications   See after visit summary for reconciled discharge medications provided to patient and family       Morning Afternoon Evening Bedtime As Needed   aspirin 81 mg EC tablet   Commonly known as: ECOTRIN LOW STRENGTH   Take 81 mg by mouth daily   Refills: 0                   EM PO   Take 25 mg by mouth daily   Refills: 0                   citalopram 20 mg tablet   Commonly known as: CeleXA   Take 10 mg by mouth daily   Refills: 0                   rosuvastatin 40 MG tablet   Commonly known as: CRESTOR   Take 40 mg by mouth daily   Refills: 0               Allergies  No Known Allergies  Discharge Diet: regular diet  Activity restrictions: none    Wayne Hart is a 65 y o  female with past medical history of hypertension who presented to [facility] with complaint of lightheadedness from home  On the initial evaluation patient was found to be hypertensive and in sinus bradycardia  Patient was monitored in the hospital for 24 hours on telemetry without any symptoms  We discharge patient in stable condition to home care  She will follow up with cardiology - Dr Seth Thomson as an outpatient  Patient was admitted to medicine service  Her problems were addressed as follows:    Asymptomatic Sinus Bradycardia - EKG showed HR 50, T wave abnormalities, anterolateral ischemia and some nonspecific T wave abnormality now evident in Inferior leads  Telemetry shows sinus bradycardia up to 44/min - asymptomatic  Echo of heart done - official report pending  Patient will schedule outpatient follow up with Dr Seth Thomson - cardiology     Hypertension - elevated BP on admission 203/88, currently stable  Continue home meds - cont  amlodipine/olmesartan     Hyperlipidemia  - continue crestor     Patient also admits taking aspirin and citalopram       Presenting Problem/History of Present Illness  Principal Problem:    Bradycardia  Active Problems:    Hypertension    Hyperlipemia    Anxiety    Dizziness  Resolved Problems:    * No resolved hospital problems  *      Other Pertinent Test Results     Discharge Condition: good    Discharge  Statement   I spent 30 minutes minutes discharging the patient  This time was spent on the day of discharge  I had direct contact with the patient on the day of discharge  Additional documentation is required if more than 30 minutes were spent on discharge

## 2018-01-20 NOTE — PROGRESS NOTES
IM Residency Progress Note   Unit/Bed#: [facility] 721-01 Encounter: [id number]  [address] 65 y o  female [id number]    Hospital Stay Days: 0    Assessment/Plan:    Asymptomatic Sinus Bradycardia   - EKG showed HR 50, T wave abnormalities, anterolateral ischemia and some nonspecific T wave abnormality now evident in Inferior leads, telemetry shows sinus bradycardia up to 44/min - asymptomatic  - Echo of heart done - official report pending   - Patient will schedule outpatient follow up with Dr Mil Patel - cardiology    Hypertension - elevated BP on admission 203/88, currently stable  Continue home meds - cont  Amlodipine/olmesartan     Hyperlipidemia  - continue crestor        Principal Problem:    Bradycardia  Active Problems:    Hypertension    Hyperlipemia    Anxiety    Dizziness    Disposition: per SOD     Subjective: Patient feels fine, no complaints  Comfortable going home today  Vitals: Temp (24hrs), Av 9 °F (36 6 °C), Min:97 5 °F (36 4 °C), Max:98 3 °F (36 8 °C)  Current: Temperature: 98 3 °F (36 8 °C)  Vitals:    18 1630 18 1900 18 0028 18 0744   BP: 152/67 119/52 (!) 102/49 107/52   BP Location: Left arm Left arm Right arm Right arm   Pulse: 61 68 (!) 54 (!) 50   Resp: 16 16 16 18   Temp: 97 8 °F (36 6 °C) 97 9 °F (36 6 °C) 98 1 °F (36 7 °C) 98 3 °F (36 8 °C)   TempSrc: Oral Oral Oral Oral   SpO2: 95% 94% 96% 96%   Weight: 64 4 kg (141 lb 15 6 oz)      Height: 5' 2" (1 575 m)       Body mass index is 25 97 kg/m²  I/O last 24 hours: In: 240 [P O :240]  Out: -       Physical Exam:   Physical Exam   Constitutional: She appears well-developed and well-nourished  HENT:   Head: Normocephalic and atraumatic  Eyes: Conjunctivae are normal  Pupils are equal, round, and reactive to light  Right eye exhibits no discharge  Left eye exhibits no discharge  No scleral icterus  Neck: Neck supple  Cardiovascular: Normal rate and regular rhythm      No murmur heard   Pulmonary/Chest: Effort normal and breath sounds normal  No respiratory distress  Abdominal: Soft  She exhibits no distension  There is no tenderness  Musculoskeletal: She exhibits no edema  Neurological: She is alert  Skin: Skin is warm and dry  Psychiatric: She has a normal mood and affect          Invasive Devices     Peripheral Intravenous Line            Peripheral IV 01/20/18 Left Antecubital less than 1 day                Labs:   Recent Results (from the past 24 hour(s))   Comprehensive metabolic panel    Collection Time: 01/19/18 10:44 AM   Result Value Ref Range    Sodium 138 136 - 145 mmol/L    Potassium 4 4 3 5 - 5 3 mmol/L    Chloride 107 100 - 108 mmol/L    CO2 23 21 - 32 mmol/L    Anion Gap 8 4 - 13 mmol/L    BUN 16 5 - 25 mg/dL    Creatinine 0 74 0 60 - 1 30 mg/dL    Glucose 105 65 - 140 mg/dL    Calcium 9 3 8 3 - 10 1 mg/dL    AST 22 5 - 45 U/L    ALT 24 12 - 78 U/L    Alkaline Phosphatase 65 46 - 116 U/L    Total Protein 8 1 6 4 - 8 2 g/dL    Albumin 3 7 3 5 - 5 0 g/dL    Total Bilirubin 0 37 0 20 - 1 00 mg/dL    eGFR 78 ml/min/1 73sq m   CBC and differential    Collection Time: 01/19/18 10:44 AM   Result Value Ref Range    WBC 7 14 4 31 - 10 16 Thousand/uL    RBC 4 27 3 81 - 5 12 Million/uL    Hemoglobin 13 8 11 5 - 15 4 g/dL    Hematocrit 40 5 34 8 - 46 1 %    MCV 95 82 - 98 fL    MCH 32 3 26 8 - 34 3 pg    MCHC 34 1 31 4 - 37 4 g/dL    RDW 13 3 11 6 - 15 1 %    MPV 9 7 8 9 - 12 7 fL    Platelets 717 093 - 886 Thousands/uL    nRBC 0 /100 WBCs    Neutrophils Relative 40 (L) 43 - 75 %    Lymphocytes Relative 51 (H) 14 - 44 %    Monocytes Relative 6 4 - 12 %    Eosinophils Relative 2 0 - 6 %    Basophils Relative 1 0 - 1 %    Neutrophils Absolute 2 86 1 85 - 7 62 Thousands/µL    Lymphocytes Absolute 3 68 0 60 - 4 47 Thousands/µL    Monocytes Absolute 0 43 0 17 - 1 22 Thousand/µL    Eosinophils Absolute 0 12 0 00 - 0 61 Thousand/µL    Basophils Absolute 0 04 0 00 - 0 10 Thousands/µL Troponin I    Collection Time: 01/19/18 10:44 AM   Result Value Ref Range    Troponin I <0 02 <=0 04 ng/mL   ECG 12 lead    Collection Time: 01/19/18 10:47 AM   Result Value Ref Range    Ventricular Rate 50 BPM    Atrial Rate 50 BPM    VT Interval 160 ms    QRSD Interval 84 ms    QT Interval 460 ms    QTC Interval 419 ms    P Axis 52 degrees    QRS Axis 62 degrees    T Wave Axis 48 degrees   POCT urinalysis dipstick    Collection Time: 01/19/18 12:38 PM   Result Value Ref Range    Color, UA see chart    ED Urine Macroscopic    Collection Time: 01/19/18 12:40 PM   Result Value Ref Range    Color, UA Yellow     Clarity, UA Clear     pH, UA 5 5 4 5 - 8 0    Leukocytes, UA Negative Negative    Nitrite, UA Negative Negative    Protein, UA Negative Negative mg/dl    Glucose, UA Negative Negative mg/dl    Ketones, UA Negative Negative mg/dl    Urobilinogen, UA 0 2 0 2, 1 0 E U /dl E U /dl    Bilirubin, UA Negative Negative    Blood, UA Small (A) Negative    Specific Gravity, UA 1 010 1 003 - 1 030   Urine Microscopic    Collection Time: 01/19/18 12:40 PM   Result Value Ref Range    RBC, UA None Seen None Seen, 0-5 /hpf    WBC, UA None Seen None Seen, 0-5, 5-55, 5-65 /hpf    Epithelial Cells None Seen None Seen, Occasional /hpf    Bacteria, UA None Seen None Seen, Occasional /hpf    Hyaline Casts, UA None Seen None Seen /lpf   ECG 12 lead    Collection Time: 01/19/18  3:24 PM   Result Value Ref Range    Ventricular Rate 56 BPM    Atrial Rate 56 BPM    VT Interval 168 ms    QRSD Interval 92 ms    QT Interval 584 ms    QTC Interval 563 ms    P Axis 66 degrees    QRS Axis 64 degrees    T Wave Axis 64 degrees   Troponin I    Collection Time: 01/19/18  3:32 PM   Result Value Ref Range    Troponin I <0 02 <=0 04 ng/mL   ECG 12 lead    Collection Time: 01/19/18  9:13 PM   Result Value Ref Range    Ventricular Rate 55 BPM    Atrial Rate 55 BPM    VT Interval 140 ms    QRSD Interval 96 ms    QT Interval 474 ms    QTC Interval 453 ms    P Axis 41 degrees    QRS Axis 13 degrees    T Wave Axis 66 degrees   Troponin I    Collection Time: 01/19/18 10:16 PM   Result Value Ref Range    Troponin I <0 02 <=0 04 ng/mL       Radiology Results: I have personally reviewed pertinent reports  Other Diagnostic Testing:   I have personally reviewed pertinent reports          Active Meds:   Current Facility-Administered Medications   Medication Dose Route Frequency    acetaminophen (TYLENOL) tablet 650 mg  650 mg Oral Q6H PRN    amLODIPine (NORVASC) 5 mg, olmesartan (BENICAR) 20 mg   Oral Daily    aspirin chewable tablet 81 mg  81 mg Oral Daily    atorvastatin (LIPITOR) tablet 40 mg  40 mg Oral Daily With Dinner         VTE Pharmacologic Prophylaxis: Enoxaparin (Lovenox)  VTE Mechanical Prophylaxis: sequential compression device    Joellen Avery MD

## 2018-01-20 NOTE — CASE MANAGEMENT
Initial Clinical Review    Admission: Date/Time/Statement:   OBS  ORDER 1/19  @  1246     Orders Placed This Encounter   Procedures    Place in Observation (expected length of stay for this patient is less than two midnights)     Standing Status:   Standing     Number of Occurrences:   1     Order Specific Question:   Admitting Physician     Answer:   Reyna Hawley     Order Specific Question:   Level of Care     Answer:   Med Surg [16]     Order Specific Question:   Bed Type     Answer:   Fl[place]io [4]         ED: Date/Time/Mode of Arrival:   ED Arrival Information     Expected Arrival Acuity Means of Arrival Escorted By Service Admission Type    - 1/19/2018 10:33 [facility]) General Medicine Emergency    Arrival Complaint    Dizziness          Chief Complaint:   Chief Complaint   Patient presents with    Dizziness     pt reports sudden onset of dizziness which started after breakfast  Pt also states she has L sided chest pain, which she thinks is related to gas   Chest Pain       History of Illness:    Marija Morales is a 65 y o  female with past medical history of hypertension who presented to [facility] ED with complaint of lightheadedness from home  Blue phone was used to conduct this interview  History is also obtained from patient, chart review and pt's PCP office  Pt reports that yesterday she felt fine, she went to the salon and had some errands to run, she states that at some point she felt "not right" she denied feeling lightheaded or feeling like she was going to pass out  She reports that she only felt like something was slightly not right  She reports that this feeling resolved on it own  She reports that this morning she as walking around her apartment when she felt the same way again  She reports that this morning she also felt like something was not right so she called EMS  Pt reports that she did take her BP medications this morning   She reports that on EMS initially her SBP was 195  She denied chest pain, sob  At this time pt is bradycardiac however she is completely asymptomatic and reports that she feels at baseline  Pt denied any cardiac hx other then mitral insufficiency non rheumatic, she denied any congential heart disease  Pt reoprts that she is able to walk a lot without having any chest pain, sob, lightheadedness       It appears that pt has hx of bradycardia  Pt had a stress test in 2015, her baseline HR at that time was 46, the stress test was negative for ischemia  Call was also placed to pt's cardiologist Dr Julissa Rehman however because their all scripts is down much information was not obtained       Upon arrival vitals: T 97 5, P 53, R 18, /88, SpO2 99% RA  EKG showed HR 50, T wave abnormalities, anterolateral ischemia and some nonspecific T wave abnormality now evident in Inferior leads  Initial troponin was negative, labs and UA were negative   CXR showed no active pulmonary disease         ED Vital Signs:   ED Triage Vitals   Temperature Pulse Respirations Blood Pressure SpO2   01/19/18 1054 01/19/18 1041 01/19/18 1041 01/19/18 1041 01/19/18 1041   97 5 °F (36 4 °C) (!) 51 18 (!) 203/88 99 %      Temp Source Heart Rate Source Patient Position - Orthostatic VS BP Location FiO2 (%)   01/19/18 1054 01/19/18 1130 01/19/18 1130 01/19/18 1130 --   Tympanic Monitor Sitting Right arm       Pain Score       01/19/18 1041       3        Wt Readings from Last 1 Encounters:   01/19/18 64 4 kg (141 lb 15 6 oz)       Vital Signs (abnormal):    above    Abnormal Labs/Diagnostic Test Results:   Labs:  No abnormalities  CXR:  NAD    ED Treatment:   Medication Administration from 01/19/2018 1033 to 01/19/2018 1611       Date/Time Order Dose Route Action Action by Comments     01/19/2018 1435 hydrALAZINE (APRESOLINE) injection 5 mg 5 mg Intravenous Given Yaima Barros RN      01/19/2018 1531 aspirin chewable tablet 81 mg 81 mg Oral Given Yaima Barros RN Past Medical/Surgical History:    Active Ambulatory Problems     Diagnosis Date Noted    No Active Ambulatory Problems     Resolved Ambulatory Problems     Diagnosis Date Noted    No Resolved Ambulatory Problems     Past Medical History:   Diagnosis Date    Hypertension        Admitting Diagnosis: Dizziness [R42]  Bradycardia [R00 1]    Age/Sex: 65 y o  female    Assessment/Plan:    Malaise likely accelerated HTN   -Symptoms now resolved      Asymptomatic Sinus Bradycardia   -EKG showed HR 50, T wave abnormalities, anterolateral ischemia and some nonspecific T wave abnormality now evident in Inferior leads   -Will monitor on telemetry   -Will get an ECHO     Accelerated hypertensive   -Initially elevated on arrival, pt self corrected to 182/76 and then elevated to 191/80   -One dose of hydralazine 5mg given and BP now 147/67   -Continue home meds   -Will monitor      Hyperlipidemia   -Continue Crestor     Admission Orders:   OBS  ORDER    1/19  @    1246  Scheduled Meds:   amLODIPine (NORVASC) 5 mg, olmesartan (BENICAR) 20 mg  Oral Daily   aspirin 81 mg Oral Daily   atorvastatin 40 mg Oral Daily With Dinner   enoxaparin 40 mg Subcutaneous Daily     Continuous Infusions:    PRN Meds:   acetaminophen     Tele  Reg diet  2 DE  Serial troponin  Orthostatic  BP  X1

## 2018-04-28 ENCOUNTER — TRANSCRIBE ORDERS (OUTPATIENT)
Dept: LAB | Facility: HOSPITAL | Age: 78
End: 2018-04-28

## 2018-04-28 ENCOUNTER — APPOINTMENT (OUTPATIENT)
Dept: LAB | Facility: HOSPITAL | Age: 78
End: 2018-04-28
Attending: INTERNAL MEDICINE
Payer: MEDICARE

## 2018-04-28 DIAGNOSIS — Z00.01 ENCOUNTER FOR GENERAL ADULT MEDICAL EXAMINATION WITH ABNORMAL FINDINGS: ICD-10-CM

## 2018-04-28 DIAGNOSIS — Z00.01 ENCOUNTER FOR GENERAL ADULT MEDICAL EXAMINATION WITH ABNORMAL FINDINGS: Primary | ICD-10-CM

## 2018-04-28 LAB
25(OH)D3 SERPL-MCNC: 21.1 NG/ML (ref 30–100)
ALBUMIN SERPL BCP-MCNC: 3.6 G/DL (ref 3.5–5)
ALP SERPL-CCNC: 63 U/L (ref 46–116)
ALT SERPL W P-5'-P-CCNC: 21 U/L (ref 12–78)
ANION GAP SERPL CALCULATED.3IONS-SCNC: 9 MMOL/L (ref 4–13)
AST SERPL W P-5'-P-CCNC: 16 U/L (ref 5–45)
BACTERIA UR QL AUTO: ABNORMAL /HPF
BASOPHILS # BLD AUTO: 0.03 THOUSANDS/ΜL (ref 0–0.1)
BASOPHILS NFR BLD AUTO: 1 % (ref 0–1)
BILIRUB SERPL-MCNC: 0.46 MG/DL (ref 0.2–1)
BILIRUB UR QL STRIP: NEGATIVE
BUN SERPL-MCNC: 13 MG/DL (ref 5–25)
CALCIUM SERPL-MCNC: 9.2 MG/DL (ref 8.3–10.1)
CHLORIDE SERPL-SCNC: 109 MMOL/L (ref 100–108)
CHOLEST SERPL-MCNC: 260 MG/DL (ref 50–200)
CLARITY UR: CLEAR
CO2 SERPL-SCNC: 22 MMOL/L (ref 21–32)
COLOR UR: YELLOW
CREAT SERPL-MCNC: 0.83 MG/DL (ref 0.6–1.3)
EOSINOPHIL # BLD AUTO: 0.09 THOUSAND/ΜL (ref 0–0.61)
EOSINOPHIL NFR BLD AUTO: 2 % (ref 0–6)
ERYTHROCYTE [DISTWIDTH] IN BLOOD BY AUTOMATED COUNT: 13.1 % (ref 11.6–15.1)
GFR SERPL CREATININE-BSD FRML MDRD: 68 ML/MIN/1.73SQ M
GLUCOSE P FAST SERPL-MCNC: 115 MG/DL (ref 65–99)
GLUCOSE UR STRIP-MCNC: NEGATIVE MG/DL
HCT VFR BLD AUTO: 38.5 % (ref 34.8–46.1)
HDLC SERPL-MCNC: 64 MG/DL (ref 40–60)
HGB BLD-MCNC: 13.2 G/DL (ref 11.5–15.4)
HGB UR QL STRIP.AUTO: ABNORMAL
HYALINE CASTS #/AREA URNS LPF: ABNORMAL /LPF
KETONES UR STRIP-MCNC: NEGATIVE MG/DL
LDLC SERPL CALC-MCNC: 159 MG/DL (ref 0–100)
LEUKOCYTE ESTERASE UR QL STRIP: ABNORMAL
LYMPHOCYTES # BLD AUTO: 2.52 THOUSANDS/ΜL (ref 0.6–4.47)
LYMPHOCYTES NFR BLD AUTO: 48 % (ref 14–44)
MCH RBC QN AUTO: 32.6 PG (ref 26.8–34.3)
MCHC RBC AUTO-ENTMCNC: 34.3 G/DL (ref 31.4–37.4)
MCV RBC AUTO: 95 FL (ref 82–98)
MONOCYTES # BLD AUTO: 0.27 THOUSAND/ΜL (ref 0.17–1.22)
MONOCYTES NFR BLD AUTO: 5 % (ref 4–12)
NEUTROPHILS # BLD AUTO: 2.3 THOUSANDS/ΜL (ref 1.85–7.62)
NEUTS SEG NFR BLD AUTO: 44 % (ref 43–75)
NITRITE UR QL STRIP: NEGATIVE
NON-SQ EPI CELLS URNS QL MICRO: ABNORMAL /HPF
NONHDLC SERPL-MCNC: 196 MG/DL
NRBC BLD AUTO-RTO: 0 /100 WBCS
PH UR STRIP.AUTO: 5.5 [PH] (ref 4.5–8)
PLATELET # BLD AUTO: 212 THOUSANDS/UL (ref 149–390)
PMV BLD AUTO: 10.3 FL (ref 8.9–12.7)
POTASSIUM SERPL-SCNC: 4.2 MMOL/L (ref 3.5–5.3)
PROT SERPL-MCNC: 7.7 G/DL (ref 6.4–8.2)
PROT UR STRIP-MCNC: NEGATIVE MG/DL
RBC # BLD AUTO: 4.05 MILLION/UL (ref 3.81–5.12)
RBC #/AREA URNS AUTO: ABNORMAL /HPF
SODIUM SERPL-SCNC: 140 MMOL/L (ref 136–145)
SP GR UR STRIP.AUTO: 1.02 (ref 1–1.03)
TRIGL SERPL-MCNC: 185 MG/DL
TSH SERPL DL<=0.05 MIU/L-ACNC: 2.12 UIU/ML (ref 0.36–3.74)
UROBILINOGEN UR QL STRIP.AUTO: 0.2 E.U./DL
WBC # BLD AUTO: 5.21 THOUSAND/UL (ref 4.31–10.16)
WBC #/AREA URNS AUTO: ABNORMAL /HPF

## 2018-04-28 PROCEDURE — 82306 VITAMIN D 25 HYDROXY: CPT

## 2018-04-28 PROCEDURE — 80061 LIPID PANEL: CPT

## 2018-04-28 PROCEDURE — 84443 ASSAY THYROID STIM HORMONE: CPT

## 2018-04-28 PROCEDURE — 80053 COMPREHEN METABOLIC PANEL: CPT

## 2018-04-28 PROCEDURE — 36415 COLL VENOUS BLD VENIPUNCTURE: CPT

## 2018-04-28 PROCEDURE — 81001 URINALYSIS AUTO W/SCOPE: CPT

## 2018-04-28 PROCEDURE — 85025 COMPLETE CBC W/AUTO DIFF WBC: CPT

## 2018-09-24 ENCOUNTER — APPOINTMENT (OUTPATIENT)
Dept: LAB | Facility: HOSPITAL | Age: 78
End: 2018-09-24
Attending: INTERNAL MEDICINE
Payer: MEDICARE

## 2018-09-24 ENCOUNTER — TRANSCRIBE ORDERS (OUTPATIENT)
Dept: LAB | Facility: HOSPITAL | Age: 78
End: 2018-09-24

## 2018-09-24 DIAGNOSIS — I10 ESSENTIAL HYPERTENSION, MALIGNANT: Primary | ICD-10-CM

## 2018-09-24 DIAGNOSIS — I10 ESSENTIAL HYPERTENSION, MALIGNANT: ICD-10-CM

## 2018-09-24 LAB
ALBUMIN SERPL BCP-MCNC: 3.5 G/DL (ref 3.5–5)
ALP SERPL-CCNC: 60 U/L (ref 46–116)
ALT SERPL W P-5'-P-CCNC: 23 U/L (ref 12–78)
ANION GAP SERPL CALCULATED.3IONS-SCNC: 7 MMOL/L (ref 4–13)
AST SERPL W P-5'-P-CCNC: 17 U/L (ref 5–45)
BACTERIA UR QL AUTO: NORMAL /HPF
BASOPHILS # BLD AUTO: 0.05 THOUSANDS/ΜL (ref 0–0.1)
BASOPHILS NFR BLD AUTO: 1 % (ref 0–1)
BILIRUB SERPL-MCNC: 0.41 MG/DL (ref 0.2–1)
BILIRUB UR QL STRIP: NEGATIVE
BUN SERPL-MCNC: 17 MG/DL (ref 5–25)
CALCIUM SERPL-MCNC: 9 MG/DL (ref 8.3–10.1)
CHLORIDE SERPL-SCNC: 108 MMOL/L (ref 100–108)
CHOLEST SERPL-MCNC: 240 MG/DL (ref 50–200)
CLARITY UR: CLEAR
CO2 SERPL-SCNC: 23 MMOL/L (ref 21–32)
COLOR UR: YELLOW
CREAT SERPL-MCNC: 0.83 MG/DL (ref 0.6–1.3)
EOSINOPHIL # BLD AUTO: 0.12 THOUSAND/ΜL (ref 0–0.61)
EOSINOPHIL NFR BLD AUTO: 3 % (ref 0–6)
ERYTHROCYTE [DISTWIDTH] IN BLOOD BY AUTOMATED COUNT: 12.8 % (ref 11.6–15.1)
GFR SERPL CREATININE-BSD FRML MDRD: 67 ML/MIN/1.73SQ M
GLUCOSE P FAST SERPL-MCNC: 107 MG/DL (ref 65–99)
GLUCOSE UR STRIP-MCNC: NEGATIVE MG/DL
HCT VFR BLD AUTO: 38.9 % (ref 34.8–46.1)
HDLC SERPL-MCNC: 68 MG/DL (ref 40–60)
HGB BLD-MCNC: 12.7 G/DL (ref 11.5–15.4)
HGB UR QL STRIP.AUTO: ABNORMAL
HYALINE CASTS #/AREA URNS LPF: NORMAL /LPF
IMM GRANULOCYTES # BLD AUTO: 0.01 THOUSAND/UL (ref 0–0.2)
IMM GRANULOCYTES NFR BLD AUTO: 0 % (ref 0–2)
KETONES UR STRIP-MCNC: NEGATIVE MG/DL
LDLC SERPL CALC-MCNC: 147 MG/DL (ref 0–100)
LEUKOCYTE ESTERASE UR QL STRIP: NEGATIVE
LYMPHOCYTES # BLD AUTO: 2.13 THOUSANDS/ΜL (ref 0.6–4.47)
LYMPHOCYTES NFR BLD AUTO: 45 % (ref 14–44)
MCH RBC QN AUTO: 32.2 PG (ref 26.8–34.3)
MCHC RBC AUTO-ENTMCNC: 32.6 G/DL (ref 31.4–37.4)
MCV RBC AUTO: 99 FL (ref 82–98)
MONOCYTES # BLD AUTO: 0.29 THOUSAND/ΜL (ref 0.17–1.22)
MONOCYTES NFR BLD AUTO: 6 % (ref 4–12)
NEUTROPHILS # BLD AUTO: 2.15 THOUSANDS/ΜL (ref 1.85–7.62)
NEUTS SEG NFR BLD AUTO: 45 % (ref 43–75)
NITRITE UR QL STRIP: NEGATIVE
NON-SQ EPI CELLS URNS QL MICRO: NORMAL /HPF
NONHDLC SERPL-MCNC: 172 MG/DL
NRBC BLD AUTO-RTO: 0 /100 WBCS
PH UR STRIP.AUTO: 5.5 [PH] (ref 4.5–8)
PLATELET # BLD AUTO: 215 THOUSANDS/UL (ref 149–390)
PMV BLD AUTO: 10.6 FL (ref 8.9–12.7)
POTASSIUM SERPL-SCNC: 4.6 MMOL/L (ref 3.5–5.3)
PROT SERPL-MCNC: 7.6 G/DL (ref 6.4–8.2)
PROT UR STRIP-MCNC: NEGATIVE MG/DL
RBC # BLD AUTO: 3.95 MILLION/UL (ref 3.81–5.12)
RBC #/AREA URNS AUTO: NORMAL /HPF
SODIUM SERPL-SCNC: 138 MMOL/L (ref 136–145)
SP GR UR STRIP.AUTO: 1.01 (ref 1–1.03)
TRIGL SERPL-MCNC: 127 MG/DL
UROBILINOGEN UR QL STRIP.AUTO: 0.2 E.U./DL
WBC # BLD AUTO: 4.75 THOUSAND/UL (ref 4.31–10.16)
WBC #/AREA URNS AUTO: NORMAL /HPF

## 2018-09-24 PROCEDURE — 80053 COMPREHEN METABOLIC PANEL: CPT

## 2018-09-24 PROCEDURE — 36415 COLL VENOUS BLD VENIPUNCTURE: CPT

## 2018-09-24 PROCEDURE — 85025 COMPLETE CBC W/AUTO DIFF WBC: CPT

## 2018-09-24 PROCEDURE — 81001 URINALYSIS AUTO W/SCOPE: CPT

## 2018-09-24 PROCEDURE — 80061 LIPID PANEL: CPT

## 2018-09-26 ENCOUNTER — TRANSCRIBE ORDERS (OUTPATIENT)
Dept: ADMINISTRATIVE | Facility: HOSPITAL | Age: 78
End: 2018-09-26

## 2018-09-26 DIAGNOSIS — Z12.39 SCREENING BREAST EXAMINATION: Primary | ICD-10-CM

## 2018-10-01 ENCOUNTER — ANNUAL EXAM (OUTPATIENT)
Dept: OBGYN CLINIC | Facility: CLINIC | Age: 78
End: 2018-10-01
Payer: MEDICARE

## 2018-10-01 VITALS
BODY MASS INDEX: 21.12 KG/M2 | HEIGHT: 62 IN | SYSTOLIC BLOOD PRESSURE: 118 MMHG | WEIGHT: 114.8 LBS | DIASTOLIC BLOOD PRESSURE: 68 MMHG

## 2018-10-01 DIAGNOSIS — Z01.419 ENCOUNTER FOR ANNUAL ROUTINE GYNECOLOGICAL EXAMINATION: Primary | ICD-10-CM

## 2018-10-01 PROCEDURE — G0101 CA SCREEN;PELVIC/BREAST EXAM: HCPCS | Performed by: OBSTETRICS & GYNECOLOGY

## 2018-10-01 NOTE — PROGRESS NOTES
Assessment/Plan:    Pap smear deferred due to low risk status  Encouraged self-breast examination as well as calcium supplementation  Continue annual mammogram   She will continue to follow-up with her primary care physician every 4 months as scheduled  Return to office in 2 years or p r n  No problem-specific Assessment & Plan notes found for this encounter  Diagnoses and all orders for this visit:    Encounter for annual routine gynecological examination          Subjective:      Patient ID: Milagro Mercedes is a 77 y o  female  HPI     This is a 18-year-old female  ( x3) presents for her annual gyn exam   Patient went through menopause at age 45  She has never been on hormone replacement therapy  She has a significant past medical history of hypertension and hypercholesterolemia which she follows up with her family physician every 4 months  Patient is very active  She lives alone  She is up-to-date with her screening mammogram as well as screening colonoscopy  She denies any vaginal bleeding or spotting  No changes in bowel or bladder function  The following portions of the patient's history were reviewed and updated as appropriate: allergies, current medications, past family history, past medical history, past social history, past surgical history and problem list     Review of Systems   Constitutional: Negative for fatigue, fever and unexpected weight change  Respiratory: Negative for cough, chest tightness, shortness of breath and wheezing  Cardiovascular: Negative  Negative for chest pain and palpitations  Gastrointestinal: Negative  Negative for abdominal distention, abdominal pain, blood in stool, constipation, diarrhea, nausea and vomiting  Genitourinary: Negative  Negative for difficulty urinating, dyspareunia, dysuria, flank pain, frequency, genital sores, hematuria, pelvic pain, urgency, vaginal bleeding, vaginal discharge and vaginal pain     Skin: Negative for rash  Objective:      /68   Ht 5' 2" (1 575 m)   Wt 52 1 kg (114 lb 12 8 oz)   Breastfeeding? No   BMI 21 00 kg/m²          Physical Exam   Constitutional: She appears well-developed and well-nourished  Cardiovascular: Normal rate and regular rhythm  Pulmonary/Chest: Effort normal and breath sounds normal  Right breast exhibits no inverted nipple, no mass, no nipple discharge, no skin change and no tenderness  Left breast exhibits no inverted nipple, no mass, no nipple discharge, no skin change and no tenderness  Abdominal: Soft  Bowel sounds are normal  She exhibits no distension  There is no tenderness  There is no rebound and no guarding  Genitourinary: Rectum normal, vagina normal and uterus normal  There is no lesion on the right labia  There is no lesion on the left labia  Cervix exhibits no discharge and no friability  Right adnexum displays no mass, no tenderness and no fullness  Left adnexum displays no mass, no tenderness and no fullness  No vaginal discharge found  Genitourinary Comments: Vaginal atrophy noted, no pelvic floor prolapse  Rectovaginal exam is confirmatory

## 2018-10-02 ENCOUNTER — HOSPITAL ENCOUNTER (OUTPATIENT)
Dept: RADIOLOGY | Facility: HOSPITAL | Age: 78
Discharge: HOME | End: 2018-10-02
Attending: INTERNAL MEDICINE
Payer: MEDICARE

## 2018-10-02 DIAGNOSIS — Z12.39 SCREENING BREAST EXAMINATION: ICD-10-CM

## 2018-10-02 PROCEDURE — 77067 SCR MAMMO BI INCL CAD: CPT

## 2018-10-31 ENCOUNTER — LAB REQUISITION (OUTPATIENT)
Dept: LAB | Facility: HOSPITAL | Age: 78
End: 2018-10-31
Payer: MEDICARE

## 2018-10-31 DIAGNOSIS — K22.70 BARRETT'S ESOPHAGUS WITHOUT DYSPLASIA: ICD-10-CM

## 2018-10-31 DIAGNOSIS — K31.89 OTHER DISEASES OF STOMACH AND DUODENUM: ICD-10-CM

## 2018-10-31 PROCEDURE — 88305 TISSUE EXAM BY PATHOLOGIST: CPT | Performed by: PATHOLOGY

## 2018-10-31 PROCEDURE — 88341 IMHCHEM/IMCYTCHM EA ADD ANTB: CPT | Performed by: PATHOLOGY

## 2018-10-31 PROCEDURE — 88342 IMHCHEM/IMCYTCHM 1ST ANTB: CPT | Performed by: PATHOLOGY

## 2019-01-24 DIAGNOSIS — F41.9 ANXIETY: Primary | ICD-10-CM

## 2019-01-24 RX ORDER — CITALOPRAM 20 MG/1
10 TABLET ORAL DAILY
Qty: 90 TABLET | Refills: 3 | Status: SHIPPED | OUTPATIENT
Start: 2019-01-24 | End: 2020-12-04

## 2019-04-09 ENCOUNTER — TRANSCRIBE ORDERS (OUTPATIENT)
Dept: LAB | Facility: HOSPITAL | Age: 79
End: 2019-04-09

## 2019-04-09 ENCOUNTER — APPOINTMENT (OUTPATIENT)
Dept: LAB | Facility: HOSPITAL | Age: 79
End: 2019-04-09
Attending: INTERNAL MEDICINE
Payer: MEDICARE

## 2019-04-09 DIAGNOSIS — I51.9 MYXEDEMA HEART DISEASE: ICD-10-CM

## 2019-04-09 DIAGNOSIS — I10 ESSENTIAL HYPERTENSION, MALIGNANT: ICD-10-CM

## 2019-04-09 DIAGNOSIS — E03.9 MYXEDEMA HEART DISEASE: Primary | ICD-10-CM

## 2019-04-09 DIAGNOSIS — E78.5 HYPERLIPIDEMIA, UNSPECIFIED HYPERLIPIDEMIA TYPE: ICD-10-CM

## 2019-04-09 DIAGNOSIS — E03.9 MYXEDEMA HEART DISEASE: ICD-10-CM

## 2019-04-09 DIAGNOSIS — I51.9 MYXEDEMA HEART DISEASE: Primary | ICD-10-CM

## 2019-04-09 LAB
25(OH)D3 SERPL-MCNC: 27.7 NG/ML (ref 30–100)
ALBUMIN SERPL BCP-MCNC: 3.6 G/DL (ref 3.5–5)
ALP SERPL-CCNC: 67 U/L (ref 46–116)
ALT SERPL W P-5'-P-CCNC: 18 U/L (ref 12–78)
ANION GAP SERPL CALCULATED.3IONS-SCNC: 3 MMOL/L (ref 4–13)
AST SERPL W P-5'-P-CCNC: 16 U/L (ref 5–45)
BACTERIA UR QL AUTO: ABNORMAL /HPF
BASOPHILS # BLD AUTO: 0.05 THOUSANDS/ΜL (ref 0–0.1)
BASOPHILS NFR BLD AUTO: 1 % (ref 0–1)
BILIRUB SERPL-MCNC: 0.42 MG/DL (ref 0.2–1)
BILIRUB UR QL STRIP: NEGATIVE
BUN SERPL-MCNC: 15 MG/DL (ref 5–25)
CALCIUM SERPL-MCNC: 8.5 MG/DL (ref 8.3–10.1)
CHLORIDE SERPL-SCNC: 108 MMOL/L (ref 100–108)
CHOLEST SERPL-MCNC: 244 MG/DL (ref 50–200)
CLARITY UR: ABNORMAL
CO2 SERPL-SCNC: 27 MMOL/L (ref 21–32)
COLOR UR: YELLOW
CREAT SERPL-MCNC: 0.86 MG/DL (ref 0.6–1.3)
EOSINOPHIL # BLD AUTO: 0.11 THOUSAND/ΜL (ref 0–0.61)
EOSINOPHIL NFR BLD AUTO: 2 % (ref 0–6)
ERYTHROCYTE [DISTWIDTH] IN BLOOD BY AUTOMATED COUNT: 12.9 % (ref 11.6–15.1)
GFR SERPL CREATININE-BSD FRML MDRD: 64 ML/MIN/1.73SQ M
GLUCOSE P FAST SERPL-MCNC: 107 MG/DL (ref 65–99)
GLUCOSE UR STRIP-MCNC: NEGATIVE MG/DL
HCT VFR BLD AUTO: 39 % (ref 34.8–46.1)
HDLC SERPL-MCNC: 68 MG/DL (ref 40–60)
HGB BLD-MCNC: 12.6 G/DL (ref 11.5–15.4)
HGB UR QL STRIP.AUTO: ABNORMAL
HYALINE CASTS #/AREA URNS LPF: ABNORMAL /LPF
IMM GRANULOCYTES # BLD AUTO: 0.01 THOUSAND/UL (ref 0–0.2)
IMM GRANULOCYTES NFR BLD AUTO: 0 % (ref 0–2)
KETONES UR STRIP-MCNC: NEGATIVE MG/DL
LDLC SERPL CALC-MCNC: 154 MG/DL (ref 0–100)
LEUKOCYTE ESTERASE UR QL STRIP: ABNORMAL
LYMPHOCYTES # BLD AUTO: 1.99 THOUSANDS/ΜL (ref 0.6–4.47)
LYMPHOCYTES NFR BLD AUTO: 42 % (ref 14–44)
MCH RBC QN AUTO: 32.1 PG (ref 26.8–34.3)
MCHC RBC AUTO-ENTMCNC: 32.3 G/DL (ref 31.4–37.4)
MCV RBC AUTO: 99 FL (ref 82–98)
MONOCYTES # BLD AUTO: 0.28 THOUSAND/ΜL (ref 0.17–1.22)
MONOCYTES NFR BLD AUTO: 6 % (ref 4–12)
NEUTROPHILS # BLD AUTO: 2.36 THOUSANDS/ΜL (ref 1.85–7.62)
NEUTS SEG NFR BLD AUTO: 49 % (ref 43–75)
NITRITE UR QL STRIP: NEGATIVE
NON-SQ EPI CELLS URNS QL MICRO: ABNORMAL /HPF
NRBC BLD AUTO-RTO: 0 /100 WBCS
PH UR STRIP.AUTO: 5.5 [PH]
PLATELET # BLD AUTO: 215 THOUSANDS/UL (ref 149–390)
PMV BLD AUTO: 10.6 FL (ref 8.9–12.7)
POTASSIUM SERPL-SCNC: 4.3 MMOL/L (ref 3.5–5.3)
PROT SERPL-MCNC: 7.5 G/DL (ref 6.4–8.2)
PROT UR STRIP-MCNC: NEGATIVE MG/DL
RBC # BLD AUTO: 3.93 MILLION/UL (ref 3.81–5.12)
RBC #/AREA URNS AUTO: ABNORMAL /HPF
SODIUM SERPL-SCNC: 138 MMOL/L (ref 136–145)
SP GR UR STRIP.AUTO: 1.02 (ref 1–1.03)
TRIGL SERPL-MCNC: 109 MG/DL
TSH SERPL DL<=0.05 MIU/L-ACNC: 1.26 UIU/ML (ref 0.36–3.74)
UROBILINOGEN UR QL STRIP.AUTO: 0.2 E.U./DL
WBC # BLD AUTO: 4.8 THOUSAND/UL (ref 4.31–10.16)
WBC #/AREA URNS AUTO: ABNORMAL /HPF

## 2019-04-09 PROCEDURE — 36415 COLL VENOUS BLD VENIPUNCTURE: CPT

## 2019-04-09 PROCEDURE — 84443 ASSAY THYROID STIM HORMONE: CPT

## 2019-04-09 PROCEDURE — 82306 VITAMIN D 25 HYDROXY: CPT

## 2019-04-09 PROCEDURE — 80053 COMPREHEN METABOLIC PANEL: CPT

## 2019-04-09 PROCEDURE — 85025 COMPLETE CBC W/AUTO DIFF WBC: CPT

## 2019-04-09 PROCEDURE — 81001 URINALYSIS AUTO W/SCOPE: CPT

## 2019-04-09 PROCEDURE — 80061 LIPID PANEL: CPT

## 2019-06-24 ENCOUNTER — TRANSCRIBE ORDERS (OUTPATIENT)
Dept: ADMINISTRATIVE | Facility: HOSPITAL | Age: 79
End: 2019-06-24

## 2019-06-24 DIAGNOSIS — M81.0 SENILE OSTEOPOROSIS: Primary | ICD-10-CM

## 2019-09-03 ENCOUNTER — LAB REQUISITION (OUTPATIENT)
Dept: LAB | Facility: HOSPITAL | Age: 79
End: 2019-09-03
Payer: MEDICARE

## 2019-09-03 ENCOUNTER — TRANSCRIBE ORDERS (OUTPATIENT)
Dept: ADMINISTRATIVE | Facility: HOSPITAL | Age: 79
End: 2019-09-03

## 2019-09-03 DIAGNOSIS — Z12.39 SCREENING FOR MALIGNANT NEOPLASM OF BREAST: Primary | ICD-10-CM

## 2019-09-03 DIAGNOSIS — N13.2 HYDRONEPHROSIS WITH RENAL AND URETERAL CALCULOUS OBSTRUCTION: ICD-10-CM

## 2019-09-03 DIAGNOSIS — N13.2 HYDRONEPHROSIS WITH RENAL AND URETERAL CALCULUS OBSTRUCTION: ICD-10-CM

## 2019-09-03 LAB
BILIRUB UR QL STRIP: NEGATIVE
CLARITY UR: CLEAR
COLOR UR: YELLOW
GLUCOSE UR STRIP-MCNC: NEGATIVE MG/DL
HGB UR QL STRIP.AUTO: NEGATIVE
KETONES UR STRIP-MCNC: NEGATIVE MG/DL
LEUKOCYTE ESTERASE UR QL STRIP: NEGATIVE
NITRITE UR QL STRIP: NEGATIVE
PH UR STRIP.AUTO: 6 [PH]
PROT UR STRIP-MCNC: NEGATIVE MG/DL
SP GR UR STRIP.AUTO: 1.01 (ref 1–1.03)
UROBILINOGEN UR QL STRIP.AUTO: 0.2 E.U./DL

## 2019-09-03 PROCEDURE — 81003 URINALYSIS AUTO W/O SCOPE: CPT | Performed by: INTERNAL MEDICINE

## 2019-09-03 PROCEDURE — 87086 URINE CULTURE/COLONY COUNT: CPT | Performed by: INTERNAL MEDICINE

## 2019-09-04 LAB — BACTERIA UR CULT: NORMAL

## 2019-09-05 ENCOUNTER — HOSPITAL ENCOUNTER (OUTPATIENT)
Dept: RADIOLOGY | Facility: HOSPITAL | Age: 79
Discharge: HOME | End: 2019-09-05
Attending: INTERNAL MEDICINE
Payer: MEDICARE

## 2019-09-05 DIAGNOSIS — N13.2 HYDRONEPHROSIS WITH RENAL AND URETERAL CALCULOUS OBSTRUCTION: ICD-10-CM

## 2019-09-05 PROCEDURE — 74176 CT ABD & PELVIS W/O CONTRAST: CPT

## 2019-09-06 ENCOUNTER — APPOINTMENT (OUTPATIENT)
Dept: LAB | Facility: HOSPITAL | Age: 79
End: 2019-09-06
Attending: INTERNAL MEDICINE
Payer: MEDICARE

## 2019-09-06 ENCOUNTER — TRANSCRIBE ORDERS (OUTPATIENT)
Dept: LAB | Facility: HOSPITAL | Age: 79
End: 2019-09-06

## 2019-09-06 DIAGNOSIS — R10.12 ABDOMINAL PAIN, LEFT UPPER QUADRANT: ICD-10-CM

## 2019-09-06 DIAGNOSIS — N13.2 HYDRONEPHROSIS WITH RENAL AND URETERAL CALCULOUS OBSTRUCTION: Primary | ICD-10-CM

## 2019-09-06 DIAGNOSIS — N13.2 HYDRONEPHROSIS WITH RENAL AND URETERAL CALCULOUS OBSTRUCTION: ICD-10-CM

## 2019-09-06 LAB
ALBUMIN SERPL BCP-MCNC: 4 G/DL (ref 3.5–5)
ALP SERPL-CCNC: 63 U/L (ref 46–116)
ALT SERPL W P-5'-P-CCNC: 20 U/L (ref 12–78)
ANION GAP SERPL CALCULATED.3IONS-SCNC: 8 MMOL/L (ref 4–13)
AST SERPL W P-5'-P-CCNC: 16 U/L (ref 5–45)
BASOPHILS # BLD AUTO: 0.06 THOUSANDS/ΜL (ref 0–0.1)
BASOPHILS NFR BLD AUTO: 1 % (ref 0–1)
BILIRUB SERPL-MCNC: 0.4 MG/DL (ref 0.2–1)
BUN SERPL-MCNC: 19 MG/DL (ref 5–25)
CALCIUM SERPL-MCNC: 9.4 MG/DL (ref 8.3–10.1)
CHLORIDE SERPL-SCNC: 110 MMOL/L (ref 100–108)
CO2 SERPL-SCNC: 24 MMOL/L (ref 21–32)
CREAT SERPL-MCNC: 0.79 MG/DL (ref 0.6–1.3)
EOSINOPHIL # BLD AUTO: 0.1 THOUSAND/ΜL (ref 0–0.61)
EOSINOPHIL NFR BLD AUTO: 2 % (ref 0–6)
ERYTHROCYTE [DISTWIDTH] IN BLOOD BY AUTOMATED COUNT: 12.6 % (ref 11.6–15.1)
GFR SERPL CREATININE-BSD FRML MDRD: 71 ML/MIN/1.73SQ M
GLUCOSE P FAST SERPL-MCNC: 104 MG/DL (ref 65–99)
HCT VFR BLD AUTO: 39.5 % (ref 34.8–46.1)
HGB BLD-MCNC: 12.8 G/DL (ref 11.5–15.4)
IMM GRANULOCYTES # BLD AUTO: 0.01 THOUSAND/UL (ref 0–0.2)
IMM GRANULOCYTES NFR BLD AUTO: 0 % (ref 0–2)
LYMPHOCYTES # BLD AUTO: 2.03 THOUSANDS/ΜL (ref 0.6–4.47)
LYMPHOCYTES NFR BLD AUTO: 38 % (ref 14–44)
MCH RBC QN AUTO: 31.9 PG (ref 26.8–34.3)
MCHC RBC AUTO-ENTMCNC: 32.4 G/DL (ref 31.4–37.4)
MCV RBC AUTO: 99 FL (ref 82–98)
MONOCYTES # BLD AUTO: 0.3 THOUSAND/ΜL (ref 0.17–1.22)
MONOCYTES NFR BLD AUTO: 6 % (ref 4–12)
NEUTROPHILS # BLD AUTO: 2.89 THOUSANDS/ΜL (ref 1.85–7.62)
NEUTS SEG NFR BLD AUTO: 53 % (ref 43–75)
NRBC BLD AUTO-RTO: 0 /100 WBCS
PLATELET # BLD AUTO: 220 THOUSANDS/UL (ref 149–390)
PMV BLD AUTO: 11.6 FL (ref 8.9–12.7)
POTASSIUM SERPL-SCNC: 4.3 MMOL/L (ref 3.5–5.3)
PROT SERPL-MCNC: 7.6 G/DL (ref 6.4–8.2)
RBC # BLD AUTO: 4.01 MILLION/UL (ref 3.81–5.12)
SODIUM SERPL-SCNC: 142 MMOL/L (ref 136–145)
WBC # BLD AUTO: 5.39 THOUSAND/UL (ref 4.31–10.16)

## 2019-09-06 PROCEDURE — 36415 COLL VENOUS BLD VENIPUNCTURE: CPT

## 2019-09-06 PROCEDURE — 85025 COMPLETE CBC W/AUTO DIFF WBC: CPT

## 2019-09-06 PROCEDURE — 80053 COMPREHEN METABOLIC PANEL: CPT

## 2019-09-18 ENCOUNTER — TRANSCRIBE ORDERS (OUTPATIENT)
Dept: ADMINISTRATIVE | Facility: HOSPITAL | Age: 79
End: 2019-09-18

## 2019-09-18 DIAGNOSIS — C25.9 MALIGNANT NEOPLASM OF PANCREAS, UNSPECIFIED LOCATION OF MALIGNANCY (HCC): Primary | ICD-10-CM

## 2019-10-07 ENCOUNTER — HOSPITAL ENCOUNTER (OUTPATIENT)
Dept: RADIOLOGY | Facility: HOSPITAL | Age: 79
Discharge: HOME | End: 2019-10-07
Attending: INTERNAL MEDICINE
Payer: MEDICARE

## 2019-10-07 VITALS — HEIGHT: 62 IN | WEIGHT: 140 LBS | BODY MASS INDEX: 25.76 KG/M2

## 2019-10-07 DIAGNOSIS — Z12.39 SCREENING FOR MALIGNANT NEOPLASM OF BREAST: ICD-10-CM

## 2019-10-07 PROCEDURE — 77067 SCR MAMMO BI INCL CAD: CPT

## 2019-11-12 ENCOUNTER — TRANSCRIBE ORDERS (OUTPATIENT)
Dept: LAB | Facility: HOSPITAL | Age: 79
End: 2019-11-12

## 2019-11-12 ENCOUNTER — APPOINTMENT (OUTPATIENT)
Dept: LAB | Facility: HOSPITAL | Age: 79
End: 2019-11-12
Attending: INTERNAL MEDICINE
Payer: MEDICARE

## 2019-11-12 DIAGNOSIS — C25.9 MALIGNANT NEOPLASM OF PANCREAS, UNSPECIFIED LOCATION OF MALIGNANCY (HCC): Primary | ICD-10-CM

## 2019-11-12 DIAGNOSIS — C25.9 MALIGNANT NEOPLASM OF PANCREAS, UNSPECIFIED LOCATION OF MALIGNANCY (HCC): ICD-10-CM

## 2019-11-12 LAB
ANION GAP SERPL CALCULATED.3IONS-SCNC: 8 MMOL/L (ref 4–13)
BUN SERPL-MCNC: 17 MG/DL (ref 5–25)
CALCIUM SERPL-MCNC: 8.9 MG/DL (ref 8.3–10.1)
CHLORIDE SERPL-SCNC: 109 MMOL/L (ref 100–108)
CO2 SERPL-SCNC: 23 MMOL/L (ref 21–32)
CREAT SERPL-MCNC: 0.85 MG/DL (ref 0.6–1.3)
GFR SERPL CREATININE-BSD FRML MDRD: 65 ML/MIN/1.73SQ M
GLUCOSE P FAST SERPL-MCNC: 114 MG/DL (ref 65–99)
POTASSIUM SERPL-SCNC: 4.1 MMOL/L (ref 3.5–5.3)
SODIUM SERPL-SCNC: 140 MMOL/L (ref 136–145)

## 2019-11-12 PROCEDURE — 36415 COLL VENOUS BLD VENIPUNCTURE: CPT

## 2019-11-12 PROCEDURE — 80048 BASIC METABOLIC PNL TOTAL CA: CPT

## 2019-11-19 ENCOUNTER — HOSPITAL ENCOUNTER (OUTPATIENT)
Dept: RADIOLOGY | Facility: HOSPITAL | Age: 79
Discharge: HOME | End: 2019-11-19
Attending: INTERNAL MEDICINE
Payer: MEDICARE

## 2019-11-19 DIAGNOSIS — C25.9 MALIGNANT NEOPLASM OF PANCREAS, UNSPECIFIED LOCATION OF MALIGNANCY (HCC): ICD-10-CM

## 2019-11-19 PROCEDURE — A9585 GADOBUTROL INJECTION: HCPCS | Performed by: INTERNAL MEDICINE

## 2019-11-19 PROCEDURE — 74183 MRI ABD W/O CNTR FLWD CNTR: CPT

## 2019-11-19 RX ADMIN — GADOBUTROL 6 ML: 604.72 INJECTION INTRAVENOUS at 08:15

## 2020-02-10 ENCOUNTER — TRANSCRIBE ORDERS (OUTPATIENT)
Dept: LAB | Facility: HOSPITAL | Age: 80
End: 2020-02-10

## 2020-02-10 ENCOUNTER — APPOINTMENT (OUTPATIENT)
Dept: LAB | Facility: HOSPITAL | Age: 80
End: 2020-02-10
Attending: INTERNAL MEDICINE
Payer: MEDICARE

## 2020-02-10 DIAGNOSIS — I10 ESSENTIAL HYPERTENSION, MALIGNANT: Primary | ICD-10-CM

## 2020-02-10 DIAGNOSIS — I10 ESSENTIAL HYPERTENSION, MALIGNANT: ICD-10-CM

## 2020-02-10 LAB
ALBUMIN SERPL BCP-MCNC: 3.6 G/DL (ref 3.5–5)
ALP SERPL-CCNC: 61 U/L (ref 46–116)
ALT SERPL W P-5'-P-CCNC: 20 U/L (ref 12–78)
ANION GAP SERPL CALCULATED.3IONS-SCNC: 7 MMOL/L (ref 4–13)
AST SERPL W P-5'-P-CCNC: 16 U/L (ref 5–45)
BACTERIA UR QL AUTO: ABNORMAL /HPF
BASOPHILS # BLD AUTO: 0.05 THOUSANDS/ΜL (ref 0–0.1)
BASOPHILS NFR BLD AUTO: 1 % (ref 0–1)
BILIRUB SERPL-MCNC: 0.38 MG/DL (ref 0.2–1)
BILIRUB UR QL STRIP: NEGATIVE
BUN SERPL-MCNC: 12 MG/DL (ref 5–25)
CALCIUM SERPL-MCNC: 9.3 MG/DL (ref 8.3–10.1)
CHLORIDE SERPL-SCNC: 109 MMOL/L (ref 100–108)
CHOLEST SERPL-MCNC: 215 MG/DL (ref 50–200)
CLARITY UR: CLEAR
CO2 SERPL-SCNC: 24 MMOL/L (ref 21–32)
COLOR UR: YELLOW
CREAT SERPL-MCNC: 0.81 MG/DL (ref 0.6–1.3)
EOSINOPHIL # BLD AUTO: 0.09 THOUSAND/ΜL (ref 0–0.61)
EOSINOPHIL NFR BLD AUTO: 2 % (ref 0–6)
ERYTHROCYTE [DISTWIDTH] IN BLOOD BY AUTOMATED COUNT: 13 % (ref 11.6–15.1)
GFR SERPL CREATININE-BSD FRML MDRD: 69 ML/MIN/1.73SQ M
GLUCOSE P FAST SERPL-MCNC: 120 MG/DL (ref 65–99)
GLUCOSE UR STRIP-MCNC: NEGATIVE MG/DL
HCT VFR BLD AUTO: 38.8 % (ref 34.8–46.1)
HDLC SERPL-MCNC: 63 MG/DL
HGB BLD-MCNC: 12.7 G/DL (ref 11.5–15.4)
HGB UR QL STRIP.AUTO: ABNORMAL
HYALINE CASTS #/AREA URNS LPF: ABNORMAL /LPF
IMM GRANULOCYTES # BLD AUTO: 0.02 THOUSAND/UL (ref 0–0.2)
IMM GRANULOCYTES NFR BLD AUTO: 0 % (ref 0–2)
KETONES UR STRIP-MCNC: NEGATIVE MG/DL
LDLC SERPL CALC-MCNC: 119 MG/DL (ref 0–100)
LEUKOCYTE ESTERASE UR QL STRIP: ABNORMAL
LYMPHOCYTES # BLD AUTO: 1.81 THOUSANDS/ΜL (ref 0.6–4.47)
LYMPHOCYTES NFR BLD AUTO: 40 % (ref 14–44)
MCH RBC QN AUTO: 32.6 PG (ref 26.8–34.3)
MCHC RBC AUTO-ENTMCNC: 32.7 G/DL (ref 31.4–37.4)
MCV RBC AUTO: 100 FL (ref 82–98)
MONOCYTES # BLD AUTO: 0.31 THOUSAND/ΜL (ref 0.17–1.22)
MONOCYTES NFR BLD AUTO: 7 % (ref 4–12)
NEUTROPHILS # BLD AUTO: 2.3 THOUSANDS/ΜL (ref 1.85–7.62)
NEUTS SEG NFR BLD AUTO: 50 % (ref 43–75)
NITRITE UR QL STRIP: NEGATIVE
NON-SQ EPI CELLS URNS QL MICRO: ABNORMAL /HPF
NONHDLC SERPL-MCNC: 152 MG/DL
NRBC BLD AUTO-RTO: 0 /100 WBCS
PH UR STRIP.AUTO: 5 [PH]
PLATELET # BLD AUTO: 208 THOUSANDS/UL (ref 149–390)
PMV BLD AUTO: 10.8 FL (ref 8.9–12.7)
POTASSIUM SERPL-SCNC: 4.2 MMOL/L (ref 3.5–5.3)
PROT SERPL-MCNC: 7.5 G/DL (ref 6.4–8.2)
PROT UR STRIP-MCNC: NEGATIVE MG/DL
RBC # BLD AUTO: 3.9 MILLION/UL (ref 3.81–5.12)
RBC #/AREA URNS AUTO: ABNORMAL /HPF
SODIUM SERPL-SCNC: 140 MMOL/L (ref 136–145)
SP GR UR STRIP.AUTO: 1.02 (ref 1–1.03)
TRIGL SERPL-MCNC: 167 MG/DL
UROBILINOGEN UR QL STRIP.AUTO: 0.2 E.U./DL
WBC # BLD AUTO: 4.58 THOUSAND/UL (ref 4.31–10.16)
WBC #/AREA URNS AUTO: ABNORMAL /HPF

## 2020-02-10 PROCEDURE — 80053 COMPREHEN METABOLIC PANEL: CPT

## 2020-02-10 PROCEDURE — 36415 COLL VENOUS BLD VENIPUNCTURE: CPT

## 2020-02-10 PROCEDURE — 85025 COMPLETE CBC W/AUTO DIFF WBC: CPT

## 2020-02-10 PROCEDURE — 80061 LIPID PANEL: CPT

## 2020-02-10 PROCEDURE — 81001 URINALYSIS AUTO W/SCOPE: CPT

## 2020-09-09 ENCOUNTER — OFFICE VISIT (OUTPATIENT)
Dept: INTERNAL MEDICINE CLINIC | Facility: CLINIC | Age: 80
End: 2020-09-09
Payer: MEDICARE

## 2020-09-09 VITALS
DIASTOLIC BLOOD PRESSURE: 70 MMHG | HEIGHT: 62 IN | HEART RATE: 64 BPM | WEIGHT: 136 LBS | BODY MASS INDEX: 25.03 KG/M2 | TEMPERATURE: 97.3 F | SYSTOLIC BLOOD PRESSURE: 140 MMHG

## 2020-09-09 DIAGNOSIS — E78.2 MIXED HYPERLIPIDEMIA: ICD-10-CM

## 2020-09-09 DIAGNOSIS — I10 ESSENTIAL HYPERTENSION: Primary | ICD-10-CM

## 2020-09-09 DIAGNOSIS — F34.1 DYSTHYMIA: ICD-10-CM

## 2020-09-09 DIAGNOSIS — K21.9 GASTROESOPHAGEAL REFLUX DISEASE WITHOUT ESOPHAGITIS: ICD-10-CM

## 2020-09-09 DIAGNOSIS — F41.9 ANXIETY: Chronic | ICD-10-CM

## 2020-09-09 DIAGNOSIS — Z23 NEED FOR INFLUENZA VACCINATION: ICD-10-CM

## 2020-09-09 PROCEDURE — G0008 ADMIN INFLUENZA VIRUS VAC: HCPCS | Performed by: INTERNAL MEDICINE

## 2020-09-09 PROCEDURE — 99214 OFFICE O/P EST MOD 30 MIN: CPT | Performed by: INTERNAL MEDICINE

## 2020-09-09 PROCEDURE — 90653 IIV ADJUVANT VACCINE IM: CPT | Performed by: INTERNAL MEDICINE

## 2020-09-09 RX ORDER — AMLODIPINE AND OLMESARTAN MEDOXOMIL 5; 20 MG/1; MG/1
2 TABLET ORAL DAILY
Qty: 180 TABLET | Refills: 0 | Status: SHIPPED | OUTPATIENT
Start: 2020-09-09 | End: 2021-03-19

## 2020-09-09 NOTE — PROGRESS NOTES
Assessment/Plan:           1  Essential hypertension  -     amlodipine-olmesartan (EM) 5-20 MG; Take 2 tablets by mouth daily    2  Anxiety    3  Mixed hyperlipidemia    4  Dysthymia           1  Essential hypertension    - amlodipine-olmesartan (EM) 5-20 MG; Take 2 tablets by mouth daily  Dispense: 180 tablet; Refill: 0    2  Anxiety      3  Mixed hyperlipidemia      4  Dysthymia             Subjective:      Patient ID: Reginald Rutherford is a 79 y o  female  HPI    The following portions of the patient's history were reviewed and updated as appropriate: She  has a past medical history of Cee's esophagus, Gastroesophageal reflux disease, Glaucoma, Hypercholesterolemia, Hyperlipidemia, Hypertension, Kidney stones (2009), Low back pain, Major depressive disorder, Mitral valve insufficiency, Osteoporosis, Osteoporosis, and Vitamin D deficiency  She   Patient Active Problem List    Diagnosis Date Noted    Hypertension 01/19/2018    Hyperlipemia 01/19/2018    Anxiety 01/19/2018    Bradycardia 01/19/2018    Dizziness 01/19/2018     She  has a past surgical history that includes Cystoscopy (2009)  Her family history includes Cancer in her mother  She  reports that she has never smoked  She has never used smokeless tobacco  She reports that she does not drink alcohol or use drugs  Current Outpatient Medications   Medication Sig Dispense Refill    aspirin (ECOTRIN LOW STRENGTH) 81 mg EC tablet Take 81 mg by mouth daily      citalopram (CeleXA) 20 mg tablet Take 0 5 tablets (10 mg total) by mouth daily 90 tablet 3    rosuvastatin (CRESTOR) 40 MG tablet Take 40 mg by mouth daily      amlodipine-olmesartan (EM) 5-20 MG Take 2 tablets by mouth daily 180 tablet 0     No current facility-administered medications for this visit        Current Outpatient Medications on File Prior to Visit   Medication Sig    aspirin (ECOTRIN LOW STRENGTH) 81 mg EC tablet Take 81 mg by mouth daily    citalopram (CeleXA) 20 mg tablet Take 0 5 tablets (10 mg total) by mouth daily    rosuvastatin (CRESTOR) 40 MG tablet Take 40 mg by mouth daily    [DISCONTINUED] Amlodipine-Olmesartan (EM PO) Take 25 mg by mouth daily     No current facility-administered medications on file prior to visit  She has No Known Allergies       Review of Systems   Constitutional: Negative for appetite change, chills, fatigue and fever  HENT: Negative for sore throat and trouble swallowing  Eyes: Negative for redness  Respiratory: Negative for shortness of breath  Cardiovascular: Negative for chest pain and palpitations  Gastrointestinal: Negative for abdominal pain, constipation and diarrhea  Genitourinary: Negative for dysuria and hematuria  Musculoskeletal: Negative for back pain and neck pain  Skin: Negative for rash  Neurological: Negative for seizures, weakness and headaches  Hematological: Negative for adenopathy  Psychiatric/Behavioral: Negative for confusion  The patient is not nervous/anxious  Objective:      /70 (BP Location: Left arm, Patient Position: Sitting)   Pulse 64   Temp (!) 97 3 °F (36 3 °C) (Temporal)   Ht 5' 2" (1 575 m)   Wt 61 7 kg (136 lb)   BMI 24 87 kg/m²     No results found for this or any previous visit (from the past 1344 hour(s))  Physical Exam  Constitutional:       General: She is not in acute distress  Appearance: Normal appearance  HENT:      Head: Normocephalic and atraumatic  Nose: Nose normal       Mouth/Throat:      Mouth: Mucous membranes are moist    Eyes:      Extraocular Movements: Extraocular movements intact  Pupils: Pupils are equal, round, and reactive to light  Cardiovascular:      Rate and Rhythm: Normal rate and regular rhythm  Pulses: Normal pulses  Heart sounds: Normal heart sounds  No murmur  No friction rub  Pulmonary:      Effort: Pulmonary effort is normal  No respiratory distress        Breath sounds: Normal breath sounds  No wheezing  Abdominal:      General: Abdomen is flat  Bowel sounds are normal  There is no distension  Palpations: Abdomen is soft  There is no mass  Tenderness: There is no abdominal tenderness  There is no guarding  Musculoskeletal: Normal range of motion  Neurological:      General: No focal deficit present  Mental Status: She is alert and oriented to person, place, and time  Mental status is at baseline  Cranial Nerves: No cranial nerve deficit     Psychiatric:         Mood and Affect: Mood normal          Behavior: Behavior normal

## 2020-10-23 ENCOUNTER — TRANSCRIBE ORDERS (OUTPATIENT)
Dept: ADMINISTRATIVE | Facility: HOSPITAL | Age: 80
End: 2020-10-23

## 2020-10-23 DIAGNOSIS — Z12.31 ENCOUNTER FOR SCREENING MAMMOGRAM FOR MALIGNANT NEOPLASM OF BREAST: Primary | ICD-10-CM

## 2020-11-05 DIAGNOSIS — E78.2 MIXED HYPERLIPIDEMIA: Primary | ICD-10-CM

## 2020-11-05 RX ORDER — ROSUVASTATIN CALCIUM 5 MG/1
TABLET, COATED ORAL
Qty: 90 TABLET | Refills: 0 | Status: SHIPPED | OUTPATIENT
Start: 2020-11-05 | End: 2021-08-16

## 2020-11-09 ENCOUNTER — LAB (OUTPATIENT)
Dept: LAB | Facility: HOSPITAL | Age: 80
End: 2020-11-09
Attending: INTERNAL MEDICINE
Payer: MEDICARE

## 2020-11-09 DIAGNOSIS — I10 ESSENTIAL HYPERTENSION: ICD-10-CM

## 2020-11-09 LAB
ALBUMIN SERPL BCP-MCNC: 3.5 G/DL (ref 3.5–5)
ALP SERPL-CCNC: 66 U/L (ref 46–116)
ALT SERPL W P-5'-P-CCNC: 18 U/L (ref 12–78)
ANION GAP SERPL CALCULATED.3IONS-SCNC: 3 MMOL/L (ref 4–13)
AST SERPL W P-5'-P-CCNC: 13 U/L (ref 5–45)
BACTERIA UR QL AUTO: ABNORMAL /HPF
BASOPHILS # BLD AUTO: 0.05 THOUSANDS/ΜL (ref 0–0.1)
BASOPHILS NFR BLD AUTO: 1 % (ref 0–1)
BILIRUB SERPL-MCNC: 0.35 MG/DL (ref 0.2–1)
BILIRUB UR QL STRIP: NEGATIVE
BUN SERPL-MCNC: 19 MG/DL (ref 5–25)
CALCIUM SERPL-MCNC: 9.1 MG/DL (ref 8.3–10.1)
CHLORIDE SERPL-SCNC: 110 MMOL/L (ref 100–108)
CLARITY UR: CLEAR
CO2 SERPL-SCNC: 27 MMOL/L (ref 21–32)
COLOR UR: YELLOW
CREAT SERPL-MCNC: 0.8 MG/DL (ref 0.6–1.3)
EOSINOPHIL # BLD AUTO: 0.18 THOUSAND/ΜL (ref 0–0.61)
EOSINOPHIL NFR BLD AUTO: 3 % (ref 0–6)
ERYTHROCYTE [DISTWIDTH] IN BLOOD BY AUTOMATED COUNT: 13 % (ref 11.6–15.1)
GFR SERPL CREATININE-BSD FRML MDRD: 69 ML/MIN/1.73SQ M
GLUCOSE P FAST SERPL-MCNC: 111 MG/DL (ref 65–99)
GLUCOSE UR STRIP-MCNC: NEGATIVE MG/DL
HCT VFR BLD AUTO: 38.9 % (ref 34.8–46.1)
HGB BLD-MCNC: 12.2 G/DL (ref 11.5–15.4)
HGB UR QL STRIP.AUTO: ABNORMAL
HYALINE CASTS #/AREA URNS LPF: ABNORMAL /LPF
IMM GRANULOCYTES # BLD AUTO: 0.01 THOUSAND/UL (ref 0–0.2)
IMM GRANULOCYTES NFR BLD AUTO: 0 % (ref 0–2)
KETONES UR STRIP-MCNC: NEGATIVE MG/DL
LEUKOCYTE ESTERASE UR QL STRIP: NEGATIVE
LYMPHOCYTES # BLD AUTO: 2.17 THOUSANDS/ΜL (ref 0.6–4.47)
LYMPHOCYTES NFR BLD AUTO: 34 % (ref 14–44)
MCH RBC QN AUTO: 31 PG (ref 26.8–34.3)
MCHC RBC AUTO-ENTMCNC: 31.4 G/DL (ref 31.4–37.4)
MCV RBC AUTO: 99 FL (ref 82–98)
MONOCYTES # BLD AUTO: 0.38 THOUSAND/ΜL (ref 0.17–1.22)
MONOCYTES NFR BLD AUTO: 6 % (ref 4–12)
NEUTROPHILS # BLD AUTO: 3.61 THOUSANDS/ΜL (ref 1.85–7.62)
NEUTS SEG NFR BLD AUTO: 56 % (ref 43–75)
NITRITE UR QL STRIP: NEGATIVE
NON-SQ EPI CELLS URNS QL MICRO: ABNORMAL /HPF
NRBC BLD AUTO-RTO: 0 /100 WBCS
PH UR STRIP.AUTO: 5 [PH]
PLATELET # BLD AUTO: 226 THOUSANDS/UL (ref 149–390)
PMV BLD AUTO: 10.3 FL (ref 8.9–12.7)
POTASSIUM SERPL-SCNC: 4.4 MMOL/L (ref 3.5–5.3)
PROT SERPL-MCNC: 7.5 G/DL (ref 6.4–8.2)
PROT UR STRIP-MCNC: NEGATIVE MG/DL
RBC # BLD AUTO: 3.93 MILLION/UL (ref 3.81–5.12)
RBC #/AREA URNS AUTO: ABNORMAL /HPF
SODIUM SERPL-SCNC: 140 MMOL/L (ref 136–145)
SP GR UR STRIP.AUTO: 1.02 (ref 1–1.03)
UROBILINOGEN UR QL STRIP.AUTO: 0.2 E.U./DL
WBC # BLD AUTO: 6.4 THOUSAND/UL (ref 4.31–10.16)
WBC #/AREA URNS AUTO: ABNORMAL /HPF

## 2020-11-09 PROCEDURE — 36415 COLL VENOUS BLD VENIPUNCTURE: CPT

## 2020-11-09 PROCEDURE — 85025 COMPLETE CBC W/AUTO DIFF WBC: CPT

## 2020-11-09 PROCEDURE — 81001 URINALYSIS AUTO W/SCOPE: CPT | Performed by: INTERNAL MEDICINE

## 2020-11-09 PROCEDURE — 80053 COMPREHEN METABOLIC PANEL: CPT

## 2020-11-11 ENCOUNTER — HOSPITAL ENCOUNTER (OUTPATIENT)
Dept: RADIOLOGY | Age: 80
Discharge: HOME | End: 2020-11-11
Payer: MEDICARE

## 2020-11-11 VITALS — BODY MASS INDEX: 25.03 KG/M2 | HEIGHT: 62 IN | WEIGHT: 136 LBS

## 2020-11-11 DIAGNOSIS — Z12.31 ENCOUNTER FOR SCREENING MAMMOGRAM FOR MALIGNANT NEOPLASM OF BREAST: ICD-10-CM

## 2020-11-11 PROCEDURE — 77067 SCR MAMMO BI INCL CAD: CPT

## 2020-11-11 PROCEDURE — 77063 BREAST TOMOSYNTHESIS BI: CPT

## 2020-12-03 DIAGNOSIS — F41.9 ANXIETY: ICD-10-CM

## 2020-12-04 RX ORDER — CITALOPRAM 20 MG/1
TABLET ORAL
Qty: 90 TABLET | Refills: 3 | Status: SHIPPED | OUTPATIENT
Start: 2020-12-04 | End: 2021-12-13

## 2020-12-09 ENCOUNTER — OFFICE VISIT (OUTPATIENT)
Dept: INTERNAL MEDICINE CLINIC | Facility: CLINIC | Age: 80
End: 2020-12-09
Payer: MEDICARE

## 2020-12-09 VITALS
DIASTOLIC BLOOD PRESSURE: 80 MMHG | BODY MASS INDEX: 25.4 KG/M2 | SYSTOLIC BLOOD PRESSURE: 130 MMHG | HEART RATE: 61 BPM | HEIGHT: 62 IN | TEMPERATURE: 96.8 F | WEIGHT: 138 LBS

## 2020-12-09 DIAGNOSIS — R42 DIZZINESS: ICD-10-CM

## 2020-12-09 DIAGNOSIS — R00.1 BRADYCARDIA: ICD-10-CM

## 2020-12-09 DIAGNOSIS — F34.1 DYSTHYMIA: ICD-10-CM

## 2020-12-09 DIAGNOSIS — E78.2 MIXED HYPERLIPIDEMIA: ICD-10-CM

## 2020-12-09 DIAGNOSIS — I10 ESSENTIAL HYPERTENSION: Primary | ICD-10-CM

## 2020-12-09 PROCEDURE — 99214 OFFICE O/P EST MOD 30 MIN: CPT | Performed by: INTERNAL MEDICINE

## 2020-12-09 PROCEDURE — 93000 ELECTROCARDIOGRAM COMPLETE: CPT | Performed by: INTERNAL MEDICINE

## 2020-12-28 DIAGNOSIS — I10 ESSENTIAL HYPERTENSION: Primary | ICD-10-CM

## 2020-12-28 RX ORDER — OLMESARTAN MEDOXOMIL 20 MG/1
TABLET ORAL
Qty: 180 TABLET | Refills: 0 | Status: SHIPPED | OUTPATIENT
Start: 2020-12-28 | End: 2021-06-04

## 2021-01-26 DIAGNOSIS — I10 ESSENTIAL HYPERTENSION: Primary | ICD-10-CM

## 2021-01-26 RX ORDER — AMLODIPINE BESYLATE 5 MG/1
TABLET ORAL
Qty: 90 TABLET | Refills: 1 | Status: SHIPPED | OUTPATIENT
Start: 2021-01-26 | End: 2021-08-30

## 2021-03-11 ENCOUNTER — IMMUNIZATIONS (OUTPATIENT)
Dept: FAMILY MEDICINE CLINIC | Facility: HOSPITAL | Age: 81
End: 2021-03-11

## 2021-03-11 DIAGNOSIS — Z23 ENCOUNTER FOR IMMUNIZATION: Primary | ICD-10-CM

## 2021-03-11 PROCEDURE — 91300 SARS-COV-2 / COVID-19 MRNA VACCINE (PFIZER-BIONTECH) 30 MCG: CPT

## 2021-03-11 PROCEDURE — 0001A SARS-COV-2 / COVID-19 MRNA VACCINE (PFIZER-BIONTECH) 30 MCG: CPT

## 2021-03-19 ENCOUNTER — OFFICE VISIT (OUTPATIENT)
Dept: INTERNAL MEDICINE CLINIC | Facility: CLINIC | Age: 81
End: 2021-03-19
Payer: MEDICARE

## 2021-03-19 VITALS
HEIGHT: 62 IN | BODY MASS INDEX: 25.76 KG/M2 | TEMPERATURE: 97.3 F | DIASTOLIC BLOOD PRESSURE: 64 MMHG | SYSTOLIC BLOOD PRESSURE: 128 MMHG | WEIGHT: 140 LBS | OXYGEN SATURATION: 95 % | HEART RATE: 66 BPM

## 2021-03-19 DIAGNOSIS — K21.9 GASTROESOPHAGEAL REFLUX DISEASE WITHOUT ESOPHAGITIS: ICD-10-CM

## 2021-03-19 DIAGNOSIS — Z78.0 ENCOUNTER FOR OSTEOPOROSIS SCREENING IN ASYMPTOMATIC POSTMENOPAUSAL PATIENT: ICD-10-CM

## 2021-03-19 DIAGNOSIS — Z13.820 ENCOUNTER FOR OSTEOPOROSIS SCREENING IN ASYMPTOMATIC POSTMENOPAUSAL PATIENT: ICD-10-CM

## 2021-03-19 DIAGNOSIS — F34.1 DYSTHYMIA: ICD-10-CM

## 2021-03-19 DIAGNOSIS — M54.9 OTHER CHRONIC BACK PAIN: ICD-10-CM

## 2021-03-19 DIAGNOSIS — H40.9 GLAUCOMA OF BOTH EYES, UNSPECIFIED GLAUCOMA TYPE: ICD-10-CM

## 2021-03-19 DIAGNOSIS — E78.2 MIXED HYPERLIPIDEMIA: ICD-10-CM

## 2021-03-19 DIAGNOSIS — K86.2 PANCREATIC CYST: ICD-10-CM

## 2021-03-19 DIAGNOSIS — G89.29 OTHER CHRONIC BACK PAIN: ICD-10-CM

## 2021-03-19 DIAGNOSIS — I10 ESSENTIAL HYPERTENSION: Primary | ICD-10-CM

## 2021-03-19 DIAGNOSIS — E55.9 VITAMIN D DEFICIENCY: ICD-10-CM

## 2021-03-19 PROBLEM — C25.9 MALIGNANT NEOPLASM OF PANCREAS (HCC): Status: ACTIVE | Noted: 2021-03-19

## 2021-03-19 PROCEDURE — 1123F ACP DISCUSS/DSCN MKR DOCD: CPT | Performed by: INTERNAL MEDICINE

## 2021-03-19 PROCEDURE — 99214 OFFICE O/P EST MOD 30 MIN: CPT | Performed by: INTERNAL MEDICINE

## 2021-03-19 RX ORDER — MULTIVIT WITH MINERALS/LUTEIN
1000 TABLET ORAL DAILY
COMMUNITY

## 2021-03-19 RX ORDER — TRAVOPROST 0.004 %
DROPS OPHTHALMIC (EYE)
COMMUNITY
Start: 2021-01-26

## 2021-03-19 RX ORDER — VITAMIN B COMPLEX
1000 TABLET ORAL DAILY
COMMUNITY

## 2021-03-19 RX ORDER — DORZOLAMIDE HCL 20 MG/ML
SOLUTION/ DROPS OPHTHALMIC
COMMUNITY
Start: 2021-03-02

## 2021-03-19 RX ORDER — OMEPRAZOLE 20 MG/1
CAPSULE, DELAYED RELEASE ORAL
COMMUNITY
Start: 2021-03-10

## 2021-03-19 NOTE — PROGRESS NOTES
Assessment and Plan:     Problem List Items Addressed This Visit        Digestive    Pancreatic cyst       Cardiovascular and Mediastinum    Essential hypertension - Primary       Other    Hyperlipemia    Vitamin D deficiency    Dysthymia      Other Visit Diagnoses     Other chronic back pain        Gastroesophageal reflux disease without esophagitis        Relevant Medications    omeprazole (PriLOSEC) 20 mg delayed release capsule    Glaucoma of both eyes, unspecified glaucoma type        Relevant Medications    dorzolamide (TRUSOPT) 2 % ophthalmic solution    Travatan Z 0 004 % ophthalmic solution           Preventive health issues were discussed with patient, and age appropriate screening tests were ordered as noted in patient's After Visit Summary  Personalized health advice and appropriate referrals for health education or preventive services given if needed, as noted in patient's After Visit Summary       History of Present Illness:     Patient presents for Medicare Annual Wellness visit    Patient Care Team:  Ami Gonzales MD as PCP - General     Problem List:     Patient Active Problem List   Diagnosis    Essential hypertension    Hyperlipemia    Anxiety    Bradycardia    Dizziness    Pancreatic cyst    Vitamin D deficiency    Dysthymia      Past Medical and Surgical History:     Past Medical History:   Diagnosis Date    Aortic insufficiency     Cee's esophagus     Gastroesophageal reflux disease     Glaucoma     Hypercholesterolemia     Hyperlipidemia     Hypertension     Kidney stones 2009    Low back pain     Major depressive disorder     Mitral valve insufficiency     Osteoporosis     Osteoporosis     Vitamin D deficiency      Past Surgical History:   Procedure Laterality Date    COLONOSCOPY  06/02/2011    Normal     CYSTOSCOPY  2009    Polyp, stones    MAMMO (HISTORICAL)  09/19/2016    Negative     OTHER SURGICAL HISTORY      Infection in arm       Family History: Family History   Problem Relation Age of Onset   Michel Ok Cancer Mother 80        unknown type of abdominal cancer    No Known Problems Father     No Known Problems Sister     No Known Problems Daughter     No Known Problems Maternal Grandmother     No Known Problems Maternal Grandfather     No Known Problems Paternal Grandmother     No Known Problems Paternal Grandfather     No Known Problems Daughter     No Known Problems Son     Lung cancer Brother 79    Lung cancer Brother 72      Social History:        Social History     Socioeconomic History    Marital status: /Civil Union     Spouse name: Not on file    Number of children: Not on file    Years of education: Not on file    Highest education level: Not on file   Occupational History    Not on file   Social Needs    Financial resource strain: Not on file    Food insecurity     Worry: Not on file     Inability: Not on file   [employer] needs     Medical: Not on file     Non-medical: Not on file   Tobacco Use    Smoking status: Never Smoker    Smokeless tobacco: Never Used   Substance and Sexual Activity    Alcohol use: No    Drug use: No    Sexual activity: Not on file   Lifestyle    Physical activity     Days per week: Not on file     Minutes per session: Not on file    Stress: Not on file   Relationships    Social connections     Talks on phone: Not on file     Gets together: Not on file     Attends [employer] service: Not on file     Active member of club or organization: Not on file     Attends meetings of clubs or organizations: Not on file     Relationship status: Not on file    Intimate partner violence     Fear of current or ex partner: Not on file     Emotionally abused: Not on file     Physically abused: Not on file     Forced sexual activity: Not on file   Other Topics Concern    Not on file   Social History Narrative    Not on file      Medications and Allergies:     Current Outpatient Medications   Medication Sig Dispense Refill    amLODIPine (NORVASC) 5 mg tablet TAKE 1 TAB DAILY 90 tablet 1    aspirin (ECOTRIN LOW STRENGTH) 81 mg EC tablet Take 81 mg by mouth daily      citalopram (CeleXA) 20 mg tablet TAKE 1/2 OR 1 TAB DAILY AS DIRECTED BY DOCTOR "GENERIC CELEXA" 90 tablet 3    dorzolamide (TRUSOPT) 2 % ophthalmic solution       olmesartan (BENICAR) 20 mg tablet TAKE 1 TAB ONCE A DAY EXCEPT FOR HIGH BP TAKE 2 TABS IF ABOVE 576 FOR SYSTOLIC 087 tablet 0    omeprazole (PriLOSEC) 20 mg delayed release capsule       rosuvastatin (CRESTOR) 5 mg tablet TAKE 1 TAB AT BEDTIME 90 tablet 0    Travatan Z 0 004 % ophthalmic solution        No current facility-administered medications for this visit  No Known Allergies   Immunizations:     Immunization History   Administered Date(s) Administered    INFLUENZA 10/21/2009, 09/09/2020    Pneumococcal Polysaccharide PPV23 06/24/2011    SARS-CoV-2 / COVID-19 mRNA IM (Pfizer-BioNTech) 03/11/2021    Tdap 04/24/2013    influenza, trivalent, adjuvanted 09/09/2020      Health Maintenance: There are no preventive care reminders to display for this patient  There are no preventive care reminders to display for this patient  Medicare Health Risk Assessment:     /64 (BP Location: Left arm, Patient Position: Sitting, Cuff Size: Standard)   Pulse 66   Temp (!) 97 3 °F (36 3 °C) (Temporal)   Ht 5' 2" (1 575 m)   Wt 63 5 kg (140 lb)   SpO2 95%   BMI 25 61 kg/m²      Reagan Thomas is here for her Subsequent Wellness visit  Last Medicare Wellness visit information reviewed, patient interviewed and updates made to the record today  Health Risk Assessment:   Patient rates overall health as good  Patient feels that their physical health rating is slightly worse  Patient is very satisfied with their life  Eyesight was rated as slightly worse  Hearing was rated as same  Patient feels that their emotional and mental health rating is same   Patients states they are never, rarely angry  Patient states they are never, rarely unusually tired/fatigued  Pain experienced in the last 7 days has been none  Patient states that she has experienced no weight loss or gain in last 6 months  Depression Screening:   PHQ-2 Score: 0  PHQ-9 Score: 0      Fall Risk Screening: In the past year, patient has experienced: no history of falling in past year      Urinary Incontinence Screening:   Patient has not leaked urine accidently in the last six months  Home Safety:  Patient does not have trouble with stairs inside or outside of their home  Patient has no working smoke alarms and has no working carbon monoxide detector  Home safety hazards include: none  Nutrition:   Current diet is Regular  Medications:   Patient is currently taking over-the-counter supplements  OTC medications include: see medication list  Patient is able to manage medications  Activities of Daily Living (ADLs)/Instrumental Activities of Daily Living (IADLs):   Walk and transfer into and out of bed and chair?: Yes  Dress and groom yourself?: Yes    Bathe or shower yourself?: Yes    Feed yourself?  Yes  Do your laundry/housekeeping?: Yes  Manage your money, pay your bills and track your expenses?: Yes  Make your own meals?: Yes    Do your own shopping?: Yes    Previous Hospitalizations:   Any hospitalizations or ED visits within the last 12 months?: No      Advance Care Planning:   Living will: Yes    Advanced directive: Yes      PREVENTIVE SCREENINGS      Cardiovascular Screening:    General: Screening Not Indicated and History Lipid Disorder      Diabetes Screening:     General: Screening Current      Breast Cancer Screening:     General: Screening Current      Cervical Cancer Screening:    General: Screening Not Indicated      Lung Cancer Screening:     General: Screening Not Indicated    Screening, Brief Intervention, and Referral to Treatment (SBIRT)    Screening  Typical number of drinks in a day: 0  Typical number of drinks in a week: 0  Interpretation: Low risk drinking behavior      Single Item Drug Screening:  How often have you used an illegal drug (including marijuana) or a prescription medication for non-medical reasons in the past year? never    Single Item Drug Screen Score: 0  Interpretation: Negative screen for possible drug use disorder      Kyleigh Ty MD

## 2021-03-19 NOTE — PROGRESS NOTES
Assessment/Plan:   this is 59-year-old lady with history of hypertension osteoporosis kidney stones low back pain hyperlipidemia depression GERD  and pancreatic cyst   Routine blood work was ordered and imaging of pancreas was  Ordered  1  Essential hypertension  Comments:    Blood pressure well controlled on amlodipine and olmesartan  Orders:  -     CBC and differential; Future  -     Comprehensive metabolic panel; Future  -     Urinalysis with microscopic    2  Mixed hyperlipidemia  Comments:    She will continue rosuvastatin  3  Pancreatic cyst  -     CT abdomen wo contrast; Future; Expected date: 03/19/2021    4  Encounter for osteoporosis screening in asymptomatic postmenopausal patient  -     DXA bone density spine hip and pelvis; Future; Expected date: 03/19/2021    5  Dysthymia    6  Vitamin D deficiency    7  Other chronic back pain    8  Gastroesophageal reflux disease without esophagitis    9  Glaucoma of both eyes, unspecified glaucoma type           1  Essential hypertension      2  Mixed hyperlipidemia      3  Dysthymia      4  Vitamin D deficiency      5  Pancreatic cyst      6  Other chronic back pain      7  Gastroesophageal reflux disease without esophagitis      8  Glaucoma of both eyes, unspecified glaucoma type             Subjective:      Patient ID: Sabrina Callejas is a 79 y o  female  HPI    The following portions of the patient's history were reviewed and updated as appropriate: She  has a past medical history of Aortic insufficiency, Cee's esophagus, Gastroesophageal reflux disease, Glaucoma, Hypercholesterolemia, Hyperlipidemia, Hypertension, Kidney stones (2009), Low back pain, Major depressive disorder, Mitral valve insufficiency, Osteoporosis, Osteoporosis, and Vitamin D deficiency    She   Patient Active Problem List    Diagnosis Date Noted    Pancreatic cyst 03/19/2021    Vitamin D deficiency 03/19/2021    Dysthymia 03/19/2021    Gastroesophageal reflux disease without esophagitis 03/19/2021    Essential hypertension 01/19/2018    Hyperlipemia 01/19/2018    Anxiety 01/19/2018    Bradycardia 01/19/2018    Dizziness 01/19/2018     She  has a past surgical history that includes Cystoscopy (2009); Other surgical history; Colonoscopy (06/02/2011); and Mammo (historical) (09/19/2016)  Her family history includes Cancer (age of onset: 80) in her mother; Lung cancer (age of onset: 72) in her brother; Lung cancer (age of onset: 79) in her brother; No Known Problems in her daughter, daughter, father, maternal grandfather, maternal grandmother, paternal grandfather, paternal grandmother, sister, and son  She  reports that she has never smoked  She has never used smokeless tobacco  She reports that she does not drink alcohol or use drugs  Current Outpatient Medications   Medication Sig Dispense Refill    amLODIPine (NORVASC) 5 mg tablet TAKE 1 TAB DAILY 90 tablet 1    Ascorbic Acid (vitamin C) 1000 MG tablet Take 1,000 mg by mouth daily      aspirin (ECOTRIN LOW STRENGTH) 81 mg EC tablet Take 81 mg by mouth daily      cholecalciferol (VITAMIN D3) 25 mcg (1,000 units) tablet Take 1,000 Units by mouth daily      citalopram (CeleXA) 20 mg tablet TAKE 1/2 OR 1 TAB DAILY AS DIRECTED BY DOCTOR "GENERIC CELEXA" 90 tablet 3    dorzolamide (TRUSOPT) 2 % ophthalmic solution       olmesartan (BENICAR) 20 mg tablet TAKE 1 TAB ONCE A DAY EXCEPT FOR HIGH BP TAKE 2 TABS IF ABOVE 701 FOR SYSTOLIC 610 tablet 0    omeprazole (PriLOSEC) 20 mg delayed release capsule       rosuvastatin (CRESTOR) 5 mg tablet TAKE 1 TAB AT BEDTIME 90 tablet 0    Travatan Z 0 004 % ophthalmic solution        No current facility-administered medications for this visit        Current Outpatient Medications on File Prior to Visit   Medication Sig    amLODIPine (NORVASC) 5 mg tablet TAKE 1 TAB DAILY    Ascorbic Acid (vitamin C) 1000 MG tablet Take 1,000 mg by mouth daily    aspirin (ECOTRIN LOW STRENGTH) 81 mg EC tablet Take 81 mg by mouth daily    cholecalciferol (VITAMIN D3) 25 mcg (1,000 units) tablet Take 1,000 Units by mouth daily    citalopram (CeleXA) 20 mg tablet TAKE 1/2 OR 1 TAB DAILY AS DIRECTED BY DOCTOR "GENERIC CELEXA"    dorzolamide (TRUSOPT) 2 % ophthalmic solution     olmesartan (BENICAR) 20 mg tablet TAKE 1 TAB ONCE A DAY EXCEPT FOR HIGH BP TAKE 2 TABS IF ABOVE 978 FOR SYSTOLIC    omeprazole (PriLOSEC) 20 mg delayed release capsule     rosuvastatin (CRESTOR) 5 mg tablet TAKE 1 TAB AT BEDTIME    Travatan Z 0 004 % ophthalmic solution     [DISCONTINUED] amlodipine-olmesartan (EM) 5-20 MG Take 2 tablets by mouth daily     No current facility-administered medications on file prior to visit  She has No Known Allergies       Review of Systems   Constitutional: Negative for appetite change, chills, fatigue and fever  HENT: Negative for sore throat and trouble swallowing  Eyes: Negative for redness  Respiratory: Negative for shortness of breath  Cardiovascular: Negative for chest pain and palpitations  Gastrointestinal: Negative for abdominal pain, constipation and diarrhea  Genitourinary: Negative for dysuria and hematuria  Musculoskeletal: Negative for back pain and neck pain  Skin: Negative for rash  Neurological: Negative for seizures, weakness and headaches  Hematological: Negative for adenopathy  Psychiatric/Behavioral: Negative for confusion  The patient is not nervous/anxious  Objective:      /64 (BP Location: Left arm, Patient Position: Sitting, Cuff Size: Standard)   Pulse 66   Temp (!) 97 3 °F (36 3 °C) (Temporal)   Ht 5' 2" (1 575 m)   Wt 63 5 kg (140 lb)   SpO2 95%   BMI 25 61 kg/m²     No results found for this or any previous visit (from the past 1344 hour(s))  Physical Exam  Constitutional:       General: She is not in acute distress  Appearance: Normal appearance  HENT:      Head: Normocephalic and atraumatic        Nose: Nose normal       Mouth/Throat:      Mouth: Mucous membranes are moist    Eyes:      Extraocular Movements: Extraocular movements intact  Pupils: Pupils are equal, round, and reactive to light  Cardiovascular:      Rate and Rhythm: Normal rate and regular rhythm  Pulses: Normal pulses  Heart sounds: Normal heart sounds  No murmur  No friction rub  Pulmonary:      Effort: Pulmonary effort is normal  No respiratory distress  Breath sounds: Normal breath sounds  No wheezing  Abdominal:      General: Abdomen is flat  Bowel sounds are normal  There is no distension  Palpations: Abdomen is soft  There is no mass  Tenderness: There is no abdominal tenderness  There is no guarding  Musculoskeletal: Normal range of motion  Neurological:      General: No focal deficit present  Mental Status: She is alert and oriented to person, place, and time  Mental status is at baseline  Cranial Nerves: No cranial nerve deficit     Psychiatric:         Mood and Affect: Mood normal          Behavior: Behavior normal

## 2021-04-01 ENCOUNTER — IMMUNIZATIONS (OUTPATIENT)
Dept: FAMILY MEDICINE CLINIC | Facility: HOSPITAL | Age: 81
End: 2021-04-01

## 2021-04-01 DIAGNOSIS — Z23 ENCOUNTER FOR IMMUNIZATION: Primary | ICD-10-CM

## 2021-04-01 PROCEDURE — 91300 SARS-COV-2 / COVID-19 MRNA VACCINE (PFIZER-BIONTECH) 30 MCG: CPT

## 2021-04-01 PROCEDURE — 0002A SARS-COV-2 / COVID-19 MRNA VACCINE (PFIZER-BIONTECH) 30 MCG: CPT

## 2021-04-06 ENCOUNTER — PROCEDURE VISIT (OUTPATIENT)
Dept: CARDIOLOGY CLINIC | Facility: CLINIC | Age: 81
End: 2021-04-06
Payer: MEDICARE

## 2021-04-06 ENCOUNTER — OFFICE VISIT (OUTPATIENT)
Dept: CARDIOLOGY CLINIC | Facility: CLINIC | Age: 81
End: 2021-04-06
Payer: MEDICARE

## 2021-04-06 VITALS
BODY MASS INDEX: 26.44 KG/M2 | HEIGHT: 62 IN | DIASTOLIC BLOOD PRESSURE: 80 MMHG | OXYGEN SATURATION: 98 % | SYSTOLIC BLOOD PRESSURE: 128 MMHG | HEART RATE: 57 BPM | WEIGHT: 143.7 LBS

## 2021-04-06 DIAGNOSIS — R00.1 BRADYCARDIA: ICD-10-CM

## 2021-04-06 DIAGNOSIS — R00.1 BRADYCARDIA: Primary | ICD-10-CM

## 2021-04-06 DIAGNOSIS — R07.9 CHEST PAIN, UNSPECIFIED TYPE: Primary | ICD-10-CM

## 2021-04-06 DIAGNOSIS — I10 ESSENTIAL HYPERTENSION: ICD-10-CM

## 2021-04-06 DIAGNOSIS — K21.9 GASTROESOPHAGEAL REFLUX DISEASE, UNSPECIFIED WHETHER ESOPHAGITIS PRESENT: ICD-10-CM

## 2021-04-06 DIAGNOSIS — R94.31 ABNORMAL ECG: ICD-10-CM

## 2021-04-06 DIAGNOSIS — E78.2 MIXED HYPERLIPIDEMIA: ICD-10-CM

## 2021-04-06 PROCEDURE — RECHECK: Performed by: INTERNAL MEDICINE

## 2021-04-06 PROCEDURE — 99204 OFFICE O/P NEW MOD 45 MIN: CPT | Performed by: INTERNAL MEDICINE

## 2021-04-06 PROCEDURE — 93000 ELECTROCARDIOGRAM COMPLETE: CPT | Performed by: INTERNAL MEDICINE

## 2021-04-06 NOTE — PATIENT INSTRUCTIONS
Diet for Stomach Ulcers and Gastritis   WHAT YOU NEED TO KNOW:   A diet for stomach ulcers and gastritis is a meal plan that limits foods that irritate your stomach  Certain foods may worsen symptoms such as stomach pain, bloating, heartburn, or indigestion  DISCHARGE INSTRUCTIONS:   Foods to limit or avoid:  You may need to avoid acidic, spicy, or high-fat foods  Not all foods affect everyone the same way  You will need to learn which foods worsen your symptoms and limit those foods  The following are some foods that may worsen ulcer or gastritis symptoms:  · Beverages:      ? Whole milk and chocolate milk    ? Hot cocoa and cola    ? Any beverage with caffeine    ? Regular and decaffeinated coffee    ? Peppermint and spearmint tea    ? Green and black tea, with or without caffeine    ? Orange and grapefruit juices    ? Drinks that contain alcohol    · Spices and seasonings:      ? Black and red pepper    ? Chili powder    ? Mustard seed and nutmeg    · Other foods:      ? Dairy foods made from whole milk or cream    ? Chocolate    ? Spicy or strongly flavored cheeses, such as jalapeno or black pepper    ? Highly seasoned, high-fat meats, such as sausage, salami, kumar, ham, and cold cuts    ? Hot chiles and peppers    ? Tomato products, such as tomato paste, tomato sauce, or tomato juice    Foods to include:  Eat a variety of healthy foods from all the food groups  Eat fruits, vegetables, whole grains, and fat-free or low-fat dairy foods  Whole grains include whole-wheat breads, cereals, pasta, and brown rice  Choose lean meats, poultry (chicken and turkey), fish, beans, eggs, and nuts  A healthy meal plan is low in unhealthy fats, salt, and added sugar  Healthy fats include olive oil and canola oil  Ask your dietitian for more information about a healthy diet  Other helpful guidelines:   · Do not eat right before bedtime  Stop eating at least 2 hours before bedtime  · Eat small, frequent meals    Your stomach may tolerate small, frequent meals better than large meals  © Copyright [address] Information is for End User's use only and may not be sold, redistributed or otherwise used for commercial purposes  All illustrations and images included in CareNotes® are the copyrighted property of A D A M , Inc  or Tala Corley  The above information is an  only  It is not intended as medical advice for individual conditions or treatments  Talk to your doctor, nurse or pharmacist before following any medical regimen to see if it is safe and effective for you

## 2021-04-06 NOTE — PROGRESS NOTES
[facility] [place] CARDIOLOGY [place]  [address] [facility] [address] [facility] [address]  Phone#  [phone]  Fax#  [phone]                                               Cardiology Office Gregorio Shaffer [address], 79 y o  female  YOB: 1940  MRN: [id number] Encounter: [id number]      PCP - Mayda Grider MD    Assessment  1  Chest pain  · Nuclear Rx stress - 2015 - LVEF 80%, no perfusion defects, stress ECG unchanged  2  Abnormal ECG  3  Bradycardia  4  Hypertension   · Echo - 1/19/2018 - LVEF 27%, grade 1 diastolic dysfunction, mild AI  5  Hyperlipidemia  6  Pancreatic cyst  7  Cee's esophagus / GERD  8  Chronic back pain    Plan  Chest pain, abnormal ECG  · ECG today shows D wave inversions in anterolateral leads concerning for ischemia  · Chest pain otherwise is atypical - ? GERD v CAD  · Already on Omeprazole to help with GERD, counseled on dietary changes for same  · Considering ongoing symptoms and abnormal ECG, recommend nuclear lexiscan stress testing to risk stratify symptoms further    Bradycardia  · Has sinus bradycardia on ECG today, and she been bradycardic recently  · Not on any negative chronotropic medications  · Does report occasional palpitations as well  · No dizziness, near-syncope or syncope  · She has a holter monitor pending throught PCP - will get that completed     Hypertension  · Blood pressure has been well controlled on olmesartan 20 mg, amlodipine 5 mg daily   · Continue same    Hyperlipidemia   · Lipid panel 02/10/2020 showed total cholesterol 215, triglycerides 167, HDL 63,    · Continue rosuvastatin 5 mg daily    ECG today -  Results for orders placed or performed in visit on 04/06/21   POCT ECG    Impression    Sinus bradycardia, T wave inversion in anterolateral leads, consider ischemia        Orders Placed This Encounter   Procedures    NM myocardial perfusion spect (rx stress and/or rest)    POCT ECG     I offered to have her set up follow-up with Dr Traci Castro, whom she saw previously, but she prefers to come here for follow-up with me  Return in about 4 months (around 8/6/2021), or if symptoms worsen or fail to improve  History of Present Illness   69-year-old female, who lives independently, and is fairly active for her age, comes in as a new patient for reestablishment of care  He previously used to see Dr Traci Castro as several years ago, but has not seen any cardiologist recently  She reports ongoing complains of intermittent chest discomfort, located substernally, typically lasting for a few minutes  There is no clear exertional component to it  She is unable to clarify precipitating factors, but it appears to be happening more at night  No associated symptoms of palpitations, dizziness or lightheadedness, shortness of breath  She saw her PCP recently, and was noted to have bradycardia  She reports occasional complains of palpitations as well, and as a result she was referred for a Holter monitor, which he has not yet completed  No history of recent syncope        Historical Information   Past Medical History:   Diagnosis Date    Aortic insufficiency     Cee's esophagus     Gastroesophageal reflux disease     Glaucoma     Hypercholesterolemia     Hyperlipidemia     Hypertension     Kidney stones 2009    Low back pain     Major depressive disorder     Mitral valve insufficiency     Osteoporosis     Osteoporosis     Vitamin D deficiency      Past Surgical History:   Procedure Laterality Date    COLONOSCOPY  06/02/2011    Normal     CYSTOSCOPY  2009    Polyp, stones    MAMMO (HISTORICAL)  09/19/2016    Negative     OTHER SURGICAL HISTORY      Infection in arm      Family History   Problem Relation Age of Onset    Cancer Mother 80        unknown type of abdominal cancer    No Known Problems Father     No Known Problems Sister     No Known Problems Daughter     No Known Problems Maternal Grandmother     No Known Problems Maternal Grandfather     No Known Problems Paternal Grandmother     No Known Problems Paternal Grandfather     No Known Problems Daughter     No Known Problems Son     Lung cancer Brother 79    Lung cancer Brother 72     Current Outpatient Medications on File Prior to Visit   Medication Sig Dispense Refill    amLODIPine (NORVASC) 5 mg tablet TAKE 1 TAB DAILY 90 tablet 1    Ascorbic Acid (vitamin C) 1000 MG tablet Take 1,000 mg by mouth daily      aspirin (ECOTRIN LOW STRENGTH) 81 mg EC tablet Take 81 mg by mouth daily      cholecalciferol (VITAMIN D3) 25 mcg (1,000 units) tablet Take 1,000 Units by mouth daily      citalopram (CeleXA) 20 mg tablet TAKE 1/2 OR 1 TAB DAILY AS DIRECTED BY DOCTOR "GENERIC CELEXA" 90 tablet 3    dorzolamide (TRUSOPT) 2 % ophthalmic solution       olmesartan (BENICAR) 20 mg tablet TAKE 1 TAB ONCE A DAY EXCEPT FOR HIGH BP TAKE 2 TABS IF ABOVE 530 FOR SYSTOLIC 408 tablet 0    omeprazole (PriLOSEC) 20 mg delayed release capsule       rosuvastatin (CRESTOR) 5 mg tablet TAKE 1 TAB AT BEDTIME 90 tablet 0    Travatan Z 0 004 % ophthalmic solution        No current facility-administered medications on file prior to visit        No Known Allergies  Social History     Socioeconomic History    Marital status: /Civil Union     Spouse name: None    Number of children: None    Years of education: None    Highest education level: None   Occupational History    None   Social Needs    Financial resource strain: None    Food insecurity     Worry: None     Inability: None    Transportation needs     Medical: None     Non-medical: None   Tobacco Use    Smoking status: Never Smoker    Smokeless tobacco: Never Used   Substance and Sexual Activity    Alcohol use: No    Drug use: No    Sexual activity: None   Lifestyle    Physical activity     Days per week: None     Minutes per session: None    Stress: None   Relationships    Social connections     Talks on phone: None Gets together: None     Attends Denominational service: None     Active member of club or organization: None     Attends meetings of clubs or organizations: None     Relationship status: None    Intimate partner violence     Fear of current or ex partner: None     Emotionally abused: None     Physically abused: None     Forced sexual activity: None   Other Topics Concern    None   Social History Narrative    None        Review of Systems   All other systems reviewed and are negative  Vitals:  Vitals:    04/06/21 0947   BP: 128/80   BP Location: Right arm   Patient Position: Sitting   Cuff Size: Standard   Pulse: 57   SpO2: 98%   Weight: 65 2 kg (143 lb 11 2 oz)   Height: 5' 2" (1 575 m)     BMI - Body mass index is 26 28 kg/m²  Wt Readings from Last 7 Encounters:   04/06/21 65 2 kg (143 lb 11 2 oz)   03/19/21 63 5 kg (140 lb)   12/09/20 62 6 kg (138 lb)   11/11/20 61 7 kg (136 lb)   09/09/20 61 7 kg (136 lb)   10/07/19 63 5 kg (140 lb)   10/01/18 52 1 kg (114 lb 12 8 oz)       Physical Exam  Vitals signs and nursing note reviewed  Constitutional:       General: She is not in acute distress  Appearance: Normal appearance  She is well-developed  She is not ill-appearing or diaphoretic  HENT:      Head: Normocephalic and atraumatic  Nose: No congestion  Eyes:      General: No scleral icterus  Conjunctiva/sclera: Conjunctivae normal    Neck:      Musculoskeletal: Neck supple  Vascular: No carotid bruit or JVD  Cardiovascular:      Rate and Rhythm: Regular rhythm  Bradycardia present  Pulses: Normal pulses  Heart sounds: Normal heart sounds  No murmur  No friction rub  No gallop  Pulmonary:      Effort: Pulmonary effort is normal  No respiratory distress  Breath sounds: Normal breath sounds  No rales  Abdominal:      General: There is no distension  Palpations: Abdomen is soft  Tenderness: There is no abdominal tenderness     Musculoskeletal:         General: No swelling or tenderness  Right lower leg: No edema  Left lower leg: No edema  Skin:     General: Skin is warm  Neurological:      General: No focal deficit present  Mental Status: She is alert and oriented to person, place, and time  Mental status is at baseline  Psychiatric:         Mood and Affect: Mood normal          Behavior: Behavior normal          Thought Content: Thought content normal            Labs:  CBC:   Lab Results   Component Value Date    WBC 6 40 2020    RBC 3 93 2020    HGB 12 2 2020    HCT 38 9 2020    MCV 99 (H) 2020     2020    RDW 13 0 2020       CMP:   Lab Results   Component Value Date     10/01/2015    K 4 4 2020     (H) 2020    CO2 27 2020    ANIONGAP 6 10/01/2015    BUN 19 2020    CREATININE 0 80 2020    EGFR 69 2020    GLUCOSE 105 10/01/2015    CALCIUM 9 1 2020    AST 13 2020    ALT 18 2020    ALKPHOS 66 2020    PROT 7 7 10/01/2015    BILITOT 0 41 10/01/2015       Magnesium:  No results found for: MG    Lipid Profile:   Lab Results   Component Value Date    CHOL 274 10/01/2015    HDL 63 02/10/2020    TRIG 167 (H) 02/10/2020    LDLCALC 119 (H) 02/10/2020       Thyroid Studies:   Lab Results   Component Value Date    ATE1YJMIDDJZ 1 260 2019    FREET4 1 13 2017       No components found for: HGA1C    No results found for: NDY7    Imaging: No results found      Cardiac testing:   Results for orders placed during the hospital encounter of 18   Echo complete with contrast if indicated    Narrative [address]  [address], [address] [facility]  [phone]    Transthoracic Echocardiogram  2D, M-mode, Doppler, and Color Doppler    Study date:  2018    Patient: Dilip Haley  MR number: [id number]  Account number: [de-identified]  : 1940  Age: 65 years  Gender: Female  Status: Outpatient  Location: Bedside  Height: 62 in  Weight: 144 lb  BP: 154/ 72 mmHg    Indications: Assess left ventricular function  Diagnoses: R00 1 - Bradycardia, unspecified    Sonographer:  MALACHI Waggoner  Primary Physician:  Blayne Okeefe MD  Referring Physician:  Lynne Morales MD  Group:  [facility]  cc: Bernard Loyd MD  Interpreting Physician:  Saida Perea MD    SUMMARY    LEFT VENTRICLE:  Systolic function was normal  Ejection fraction was estimated to be 60 %  There were no regional wall motion abnormalities  Doppler parameters were consistent with abnormal left ventricular relaxation (grade 1 diastolic dysfunction)  AORTIC VALVE:  There was mild regurgitation  HISTORY: PRIOR HISTORY: Bradycardia, Dizziness,    PROCEDURE: The procedure was performed at the bedside  This was a routine study  The transthoracic approach was used  The study included complete 2D imaging, M-mode, complete spectral Doppler, and color Doppler  The heart rate was 62 bpm,  at the start of the study  Images were obtained from the parasternal, apical, subcostal, and suprasternal notch acoustic windows  Image quality was adequate  LEFT VENTRICLE: Size was normal  Systolic function was normal  Ejection fraction was estimated to be 60 %  There were no regional wall motion abnormalities  Wall thickness was normal  DOPPLER: There was an increased relative contribution  of atrial contraction to ventricular filling  Doppler parameters were consistent with abnormal left ventricular relaxation (grade 1 diastolic dysfunction)  RIGHT VENTRICLE: The size was normal  Systolic function was normal  Wall thickness was normal     LEFT ATRIUM: Size was normal     RIGHT ATRIUM: Size was normal     MITRAL VALVE: Valve structure was normal  There was normal leaflet separation  DOPPLER: The transmitral velocity was within the normal range  There was no evidence for stenosis   There was trace regurgitation  AORTIC VALVE: The valve was trileaflet  Leaflets exhibited normal thickness and normal cuspal separation  DOPPLER: Transaortic velocity was within the normal range  There was no evidence for stenosis  There was mild regurgitation  TRICUSPID VALVE: The valve structure was normal  There was normal leaflet separation  DOPPLER: The transtricuspid velocity was within the normal range  There was no evidence for stenosis  There was trace regurgitation  Pulmonary artery  systolic pressure was within the normal range  Estimated peak PA pressure was 32 mmHg  PULMONIC VALVE: Leaflets exhibited normal thickness, no calcification, and normal cuspal separation  DOPPLER: The transpulmonic velocity was within the normal range  There was trace regurgitation  PERICARDIUM: There was no pericardial effusion  The pericardium was normal in appearance  AORTA: The root exhibited normal size  SYSTEMIC VEINS: IVC: The inferior vena cava was normal in size  Respirophasic changes were normal     SYSTEM MEASUREMENT TABLES    2D  %FS: 26 7 %  Ao Diam: 3 17 cm  EDV(Teich): 129 72 ml  EF(Teich): 51 81 %  ESV(Teich): 62 51 ml  IVSd: 0 89 cm  LA Area: 19 32 cm2  LA Diam: 4 44 cm  LVEDV MOD A4C: 74 68 ml  LVEF MOD A4C: 69 71 %  LVESV MOD A4C: 22 62 ml  LVIDd: 5 2 cm  LVIDs: 3 81 cm  LVLd A4C: 6 31 cm  LVLs A4C: 5 19 cm  LVPWd: 0 93 cm  RA Area: 19 05 cm2  RVIDd: 3 12 cm  SV MOD A4C: 52 06 ml  SV(Teich): 67 21 ml    CW  TR Vmax: 2 71 m/s  TR maxP 39 mmHg    PW  E': 0 06 m/s  E/E': 8 24  LVOT Env  Ti: 312 08 ms  LVOT VTI: 30 06 cm  LVOT Vmax: 1 58 m/s  LVOT Vmean: 0 96 m/s  LVOT maxPG: 10 08 mmHg  LVOT meanP 61 mmHg  MV A Dre: 0 8 m/s  MV Dec [place]: 2 37 m/s2  MV DecT: 210 94 ms  MV E Dre: 0 5 m/s  MV E/A Ratio: 0 62  MV PHT: 61 17 ms  MVA By PHT: 3 6 cm2    [facility]    Prepared and electronically signed by    Partha Delgado MD  Signed 2018 13:43:14       No results found for this or any previous visit  No results found for this or any previous visit  No results found for this or any previous visit

## 2021-04-08 DIAGNOSIS — R07.9 CHEST PAIN, UNSPECIFIED TYPE: ICD-10-CM

## 2021-04-08 DIAGNOSIS — R00.1 BRADYCARDIA: Primary | ICD-10-CM

## 2021-04-12 ENCOUNTER — HOSPITAL ENCOUNTER (OUTPATIENT)
Dept: NON INVASIVE DIAGNOSTICS | Facility: HOSPITAL | Age: 81
Discharge: HOME | End: 2021-04-12
Payer: MEDICARE

## 2021-04-12 DIAGNOSIS — R07.9 CHEST PAIN, UNSPECIFIED TYPE: ICD-10-CM

## 2021-04-12 DIAGNOSIS — R00.1 BRADYCARDIA: ICD-10-CM

## 2021-04-12 PROCEDURE — 93225 XTRNL ECG REC<48 HRS REC: CPT

## 2021-04-12 PROCEDURE — 93226 XTRNL ECG REC<48 HR SCAN A/R: CPT

## 2021-04-12 PROCEDURE — 93227 XTRNL ECG REC<48 HR R&I: CPT | Performed by: INTERNAL MEDICINE

## 2021-04-13 ENCOUNTER — TELEPHONE (OUTPATIENT)
Dept: NON INVASIVE DIAGNOSTICS | Facility: CLINIC | Age: 81
End: 2021-04-13

## 2021-04-14 ENCOUNTER — HOSPITAL ENCOUNTER (OUTPATIENT)
Dept: NON INVASIVE DIAGNOSTICS | Facility: CLINIC | Age: 81
Discharge: HOME | End: 2021-04-14
Payer: MEDICARE

## 2021-04-14 DIAGNOSIS — R94.31 ABNORMAL ECG: ICD-10-CM

## 2021-04-14 DIAGNOSIS — R07.9 CHEST PAIN, UNSPECIFIED TYPE: ICD-10-CM

## 2021-04-14 LAB
CHEST PAIN STATEMENT: NORMAL
MAX DIASTOLIC BP: 60 MMHG
MAX HEART RATE: 90 BPM
MAX PREDICTED HEART RATE: 139 BPM
MAX. SYSTOLIC BP: 150 MMHG
PROTOCOL NAME: NORMAL
REASON FOR TERMINATION: NORMAL
TARGET HR FORMULA: NORMAL
TEST INDICATION: NORMAL
TIME IN EXERCISE PHASE: NORMAL

## 2021-04-14 PROCEDURE — 93017 CV STRESS TEST TRACING ONLY: CPT

## 2021-04-14 PROCEDURE — A9502 TC99M TETROFOSMIN: HCPCS

## 2021-04-14 PROCEDURE — G1004 CDSM NDSC: HCPCS

## 2021-04-14 PROCEDURE — 93016 CV STRESS TEST SUPVJ ONLY: CPT | Performed by: INTERNAL MEDICINE

## 2021-04-14 PROCEDURE — 93018 CV STRESS TEST I&R ONLY: CPT | Performed by: INTERNAL MEDICINE

## 2021-04-14 PROCEDURE — 78452 HT MUSCLE IMAGE SPECT MULT: CPT

## 2021-04-14 PROCEDURE — 78452 HT MUSCLE IMAGE SPECT MULT: CPT | Performed by: INTERNAL MEDICINE

## 2021-04-14 RX ADMIN — REGADENOSON 0.4 MG: 0.08 INJECTION, SOLUTION INTRAVENOUS at 12:55

## 2021-04-15 ENCOUNTER — TELEPHONE (OUTPATIENT)
Dept: CARDIOLOGY CLINIC | Facility: CLINIC | Age: 81
End: 2021-04-15

## 2021-04-15 NOTE — TELEPHONE ENCOUNTER
----- Message from Katie Olsen MD sent at 4/14/2021  8:03 PM EDT -----  Normal cardiac function, no significant ischemia/CAD  I spoke with patient, she understood results 
5

## 2021-05-07 ENCOUNTER — OFFICE VISIT (OUTPATIENT)
Dept: INTERNAL MEDICINE CLINIC | Facility: CLINIC | Age: 81
End: 2021-05-07
Payer: MEDICARE

## 2021-05-07 VITALS
SYSTOLIC BLOOD PRESSURE: 125 MMHG | HEART RATE: 63 BPM | TEMPERATURE: 97.2 F | BODY MASS INDEX: 26.31 KG/M2 | DIASTOLIC BLOOD PRESSURE: 65 MMHG | HEIGHT: 62 IN | WEIGHT: 143 LBS | OXYGEN SATURATION: 97 %

## 2021-05-07 DIAGNOSIS — G89.29 OTHER CHRONIC BACK PAIN: ICD-10-CM

## 2021-05-07 DIAGNOSIS — E78.2 MIXED HYPERLIPIDEMIA: ICD-10-CM

## 2021-05-07 DIAGNOSIS — M54.9 OTHER CHRONIC BACK PAIN: ICD-10-CM

## 2021-05-07 DIAGNOSIS — I10 ESSENTIAL HYPERTENSION: ICD-10-CM

## 2021-05-07 DIAGNOSIS — R30.0 DYSURIA: Primary | ICD-10-CM

## 2021-05-07 PROCEDURE — 87086 URINE CULTURE/COLONY COUNT: CPT | Performed by: INTERNAL MEDICINE

## 2021-05-07 PROCEDURE — 99213 OFFICE O/P EST LOW 20 MIN: CPT | Performed by: INTERNAL MEDICINE

## 2021-05-07 RX ORDER — CEPHALEXIN 500 MG/1
500 CAPSULE ORAL EVERY 8 HOURS SCHEDULED
Qty: 15 CAPSULE | Refills: 0 | Status: SHIPPED | OUTPATIENT
Start: 2021-05-07 | End: 2021-05-12

## 2021-05-07 NOTE — PROGRESS NOTES
Assessment/Plan:           1  Dysuria  Comments:  Urine dip was positive and cephalexin was prescribed  Orders:  -     cephalexin (KEFLEX) 500 mg capsule; Take 1 capsule (500 mg total) by mouth every 8 (eight) hours for 5 days  -     Urine culture; Future  -     Urine culture    2  Essential hypertension  Comments:  Continue current medication blood pressure is stable on olmesartan and amlodipine  3  Mixed hyperlipidemia    4  Other chronic back pain           1  Dysuria    - cephalexin (KEFLEX) 500 mg capsule; Take 1 capsule (500 mg total) by mouth every 8 (eight) hours for 5 days  Dispense: 15 capsule; Refill: 0  - Urine culture; Future    2  Essential hypertension      3  Mixed hyperlipidemia             Subjective:      Patient ID: Isis Edwards is a 79 y o  female  This is 76-year-old lady with a history of hypertension hyperlipidemia GERD gastritis dizziness vitamin-D deficiency osteoporosis pancreatic cyst glaucoma kidney stones and depression  The following portions of the patient's history were reviewed and updated as appropriate: She  has a past medical history of Aortic insufficiency, Cee's esophagus, Gastroesophageal reflux disease, Glaucoma, Hypercholesterolemia, Hyperlipidemia, Hypertension, Kidney stones (2009), Low back pain, Major depressive disorder, Mitral valve insufficiency, Osteoporosis, Osteoporosis, and Vitamin D deficiency  She   Patient Active Problem List    Diagnosis Date Noted    Pancreatic cyst 03/19/2021    Vitamin D deficiency 03/19/2021    Dysthymia 03/19/2021    Gastroesophageal reflux disease without esophagitis 03/19/2021    Essential hypertension 01/19/2018    Hyperlipemia 01/19/2018    Anxiety 01/19/2018    Bradycardia 01/19/2018    Dizziness 01/19/2018     She  has a past surgical history that includes Cystoscopy (2009); Other surgical history; Colonoscopy (06/02/2011); and Mammo (historical) (09/19/2016)    Her family history includes Cancer (age of onset: 80) in her mother; Lung cancer (age of onset: 72) in her brother; Lung cancer (age of onset: 79) in her brother; No Known Problems in her daughter, daughter, father, maternal grandfather, maternal grandmother, paternal grandfather, paternal grandmother, sister, and son  She  reports that she has never smoked  She has never used smokeless tobacco  She reports that she does not drink alcohol or use drugs  Current Outpatient Medications   Medication Sig Dispense Refill    amLODIPine (NORVASC) 5 mg tablet TAKE 1 TAB DAILY 90 tablet 1    Ascorbic Acid (vitamin C) 1000 MG tablet Take 1,000 mg by mouth daily      aspirin (ECOTRIN LOW STRENGTH) 81 mg EC tablet Take 81 mg by mouth daily      cholecalciferol (VITAMIN D3) 25 mcg (1,000 units) tablet Take 1,000 Units by mouth daily      citalopram (CeleXA) 20 mg tablet TAKE 1/2 OR 1 TAB DAILY AS DIRECTED BY DOCTOR "GENERIC CELEXA" 90 tablet 3    dorzolamide (TRUSOPT) 2 % ophthalmic solution       olmesartan (BENICAR) 20 mg tablet TAKE 1 TAB ONCE A DAY EXCEPT FOR HIGH BP TAKE 2 TABS IF ABOVE 652 FOR SYSTOLIC 553 tablet 0    omeprazole (PriLOSEC) 20 mg delayed release capsule       rosuvastatin (CRESTOR) 5 mg tablet TAKE 1 TAB AT BEDTIME 90 tablet 0    Travatan Z 0 004 % ophthalmic solution       cephalexin (KEFLEX) 500 mg capsule Take 1 capsule (500 mg total) by mouth every 8 (eight) hours for 5 days 15 capsule 0     No current facility-administered medications for this visit        Current Outpatient Medications on File Prior to Visit   Medication Sig    amLODIPine (NORVASC) 5 mg tablet TAKE 1 TAB DAILY    Ascorbic Acid (vitamin C) 1000 MG tablet Take 1,000 mg by mouth daily    aspirin (ECOTRIN LOW STRENGTH) 81 mg EC tablet Take 81 mg by mouth daily    cholecalciferol (VITAMIN D3) 25 mcg (1,000 units) tablet Take 1,000 Units by mouth daily    citalopram (CeleXA) 20 mg tablet TAKE 1/2 OR 1 TAB DAILY AS DIRECTED BY DOCTOR "Cathleen Vick"    dorzolamide (TRUSOPT) 2 % ophthalmic solution     olmesartan (BENICAR) 20 mg tablet TAKE 1 TAB ONCE A DAY EXCEPT FOR HIGH BP TAKE 2 TABS IF ABOVE 779 FOR SYSTOLIC    omeprazole (PriLOSEC) 20 mg delayed release capsule     rosuvastatin (CRESTOR) 5 mg tablet TAKE 1 TAB AT BEDTIME    Travatan Z 0 004 % ophthalmic solution      No current facility-administered medications on file prior to visit  She has No Known Allergies       Review of Systems   Constitutional: Negative for appetite change, chills, fatigue and fever  HENT: Negative for sore throat and trouble swallowing  Eyes: Negative for redness  Respiratory: Negative for shortness of breath  Cardiovascular: Negative for chest pain and palpitations  Gastrointestinal: Negative for abdominal pain, constipation and diarrhea  Genitourinary: Negative for dysuria and hematuria  Musculoskeletal: Negative for back pain and neck pain  Skin: Negative for rash  Neurological: Negative for seizures, weakness and headaches  Hematological: Negative for adenopathy  Psychiatric/Behavioral: Negative for confusion  The patient is not nervous/anxious  Objective:      /65 (BP Location: Left arm, Patient Position: Sitting, Cuff Size: Standard)   Pulse 63   Temp (!) 97 2 °F (36 2 °C) (Temporal)   Ht 5' 2" (1 575 m)   Wt 64 9 kg (143 lb)   SpO2 97%   BMI 26 16 kg/m²     Recent Results (from the past 1344 hour(s))   Stress strip    Collection Time: 04/14/21 12:52 PM   Result Value Ref Range    Protocol Name [address]     Time In Exercise Phase 00:03:00     MAX  SYSTOLIC  mmHg    Max Diastolic Bp 60 mmHg    Max Heart Rate 90 BPM    Max Predicted Heart Rate 139 BPM    Reason for Termination TEST COMPLETE         Test Indication Chest pain, Abn  EKG     Target Hr Formular (220 - Age)*100%     Arrhy During Ex      ECG Interp Before Ex      ECG Interp during Ex      Ex Summary Comment      Chest Pain Statement none     Overall Hr Response To Exercise      Overall BP Response To Exercise     Urine culture    Collection Time: 05/07/21  1:42 PM    Specimen: Urine, Clean Catch   Result Value Ref Range    Urine Culture <10,000 cfu/ml          Physical Exam  Constitutional:       General: She is not in acute distress  Appearance: Normal appearance  HENT:      Head: Normocephalic and atraumatic  Nose: Nose normal       Mouth/Throat:      Mouth: Mucous membranes are moist    Eyes:      Extraocular Movements: Extraocular movements intact  Pupils: Pupils are equal, round, and reactive to light  Cardiovascular:      Rate and Rhythm: Normal rate and regular rhythm  Pulses: Normal pulses  Heart sounds: Normal heart sounds  No murmur  No friction rub  Pulmonary:      Effort: Pulmonary effort is normal  No respiratory distress  Breath sounds: Normal breath sounds  No wheezing  Abdominal:      General: Abdomen is flat  Bowel sounds are normal  There is no distension  Palpations: Abdomen is soft  There is no mass  Tenderness: There is no abdominal tenderness  There is no guarding  Musculoskeletal: Normal range of motion  Neurological:      General: No focal deficit present  Mental Status: She is alert and oriented to person, place, and time  Mental status is at baseline  Cranial Nerves: No cranial nerve deficit  Psychiatric:         Mood and Affect: Mood normal          Behavior: Behavior normal        BMI Counseling: Body mass index is 26 16 kg/m²  The BMI is above normal  Nutrition recommendations include reducing portion sizes, decreasing overall calorie intake and 3-5 servings of fruits/vegetables daily

## 2021-05-08 LAB — BACTERIA UR CULT: NORMAL

## 2021-05-10 ENCOUNTER — CLINICAL SUPPORT (OUTPATIENT)
Dept: INTERNAL MEDICINE CLINIC | Facility: CLINIC | Age: 81
End: 2021-05-10

## 2021-05-10 DIAGNOSIS — R30.0 DYSURIA: Primary | ICD-10-CM

## 2021-05-10 LAB
BACTERIA UR QL AUTO: NORMAL /HPF
BILIRUB UR QL STRIP: NEGATIVE
CLARITY UR: CLEAR
COLOR UR: YELLOW
GLUCOSE UR STRIP-MCNC: NEGATIVE MG/DL
HGB UR QL STRIP.AUTO: NEGATIVE
KETONES UR STRIP-MCNC: NEGATIVE MG/DL
LEUKOCYTE ESTERASE UR QL STRIP: NEGATIVE
NITRITE UR QL STRIP: NEGATIVE
NON-SQ EPI CELLS URNS QL MICRO: NORMAL /HPF
PH UR STRIP.AUTO: 6 [PH]
PROT UR STRIP-MCNC: NEGATIVE MG/DL
RBC #/AREA URNS AUTO: NORMAL /HPF
SP GR UR STRIP.AUTO: 1 (ref 1–1.03)
UROBILINOGEN UR QL STRIP.AUTO: 0.2 E.U./DL
WBC #/AREA URNS AUTO: NORMAL /HPF

## 2021-05-10 PROCEDURE — 81001 URINALYSIS AUTO W/SCOPE: CPT | Performed by: INTERNAL MEDICINE

## 2021-05-10 PROCEDURE — 87086 URINE CULTURE/COLONY COUNT: CPT | Performed by: INTERNAL MEDICINE

## 2021-05-12 ENCOUNTER — ANNUAL EXAM (OUTPATIENT)
Dept: OBGYN CLINIC | Facility: CLINIC | Age: 81
End: 2021-05-12
Payer: MEDICARE

## 2021-05-12 VITALS
HEIGHT: 62 IN | SYSTOLIC BLOOD PRESSURE: 118 MMHG | BODY MASS INDEX: 26.13 KG/M2 | DIASTOLIC BLOOD PRESSURE: 68 MMHG | WEIGHT: 142 LBS

## 2021-05-12 DIAGNOSIS — Z01.419 ENCOUNTER FOR ANNUAL ROUTINE GYNECOLOGICAL EXAMINATION: Primary | ICD-10-CM

## 2021-05-12 DIAGNOSIS — Z12.31 ENCOUNTER FOR SCREENING MAMMOGRAM FOR BREAST CANCER: ICD-10-CM

## 2021-05-12 LAB — BACTERIA UR CULT: ABNORMAL

## 2021-05-12 PROCEDURE — G0101 CA SCREEN;PELVIC/BREAST EXAM: HCPCS | Performed by: OBSTETRICS & GYNECOLOGY

## 2021-05-12 RX ORDER — BIMATOPROST 0.01 %
DROPS OPHTHALMIC (EYE)
COMMUNITY
Start: 2021-04-23

## 2021-05-12 NOTE — PROGRESS NOTES
Assessment/Plan:    Pap smear deferred due to low risk status  Encouraged self-breast examination as well as calcium supplementation  Continue annual mammogram   Reviewed colon cancer screening, up-to-date  She is scheduled for DEXA scan in the near future  She will continue to follow-up with her family physician as scheduled  Return to office in 2 years per Medicare recommendations/protocol  No problem-specific Assessment & Plan notes found for this encounter  Diagnoses and all orders for this visit:    Encounter for annual routine gynecological examination    Encounter for screening mammogram for breast cancer  -     Mammo screening bilateral w 3d & cad; Future    Other orders  -     Lumigan 0 01 % ophthalmic drops          Subjective:      Patient ID: Steve Loaiza is a 79 y o  female  HPI       This is a very pleasant 79-year-old female  ( x3) presents for her annual gyn exam   Patient went through menopause at age 45  She has never been on hormone replacement therapy  She denies any vaginal bleeding or spotting  She was recently treated for a bladder infection with 5 days of oral antibiotics  Shortly thereafter she noticed vaginal itching and irritation and has used an over-the-counter vaginal cream in noticed market improvement  There was no bleeding or spotting  Patient is very active  She does follow up with her family physician on a regular basis  The following portions of the patient's history were reviewed and updated as appropriate: allergies, current medications, past family history, past medical history, past social history, past surgical history and problem list     Review of Systems   Constitutional: Negative for fatigue, fever and unexpected weight change  Respiratory: Negative for cough, chest tightness, shortness of breath and wheezing  Cardiovascular: Negative  Negative for chest pain and palpitations  Gastrointestinal: Negative    Negative for abdominal distention, abdominal pain, blood in stool, constipation, diarrhea, nausea and vomiting  Genitourinary: Negative  Negative for difficulty urinating, dyspareunia, dysuria, flank pain, frequency, genital sores, hematuria, pelvic pain, urgency, vaginal bleeding, vaginal discharge and vaginal pain  Skin: Negative for rash  Objective:      /68   Ht 5' 2" (1 575 m)   Wt 64 4 kg (142 lb)   BMI 25 97 kg/m²          Physical Exam  Constitutional:       Appearance: Normal appearance  She is well-developed  Cardiovascular:      Rate and Rhythm: Normal rate and regular rhythm  Pulmonary:      Effort: Pulmonary effort is normal       Breath sounds: Normal breath sounds  Chest:      Breasts:         Right: No inverted nipple, mass, nipple discharge, skin change or tenderness  Left: No inverted nipple, mass, nipple discharge, skin change or tenderness  Abdominal:      General: Bowel sounds are normal  There is no distension  Palpations: Abdomen is soft  Tenderness: There is no abdominal tenderness  There is no guarding or rebound  Genitourinary:     Labia:         Right: No rash, tenderness or lesion  Left: No rash, tenderness or lesion  Vagina: Normal  No signs of injury  No vaginal discharge, tenderness or lesions  Cervix: No cervical motion tenderness, discharge, friability, lesion, erythema or cervical bleeding  Uterus: Not enlarged and not tender  Adnexa:         Right: No mass, tenderness or fullness  Left: No mass, tenderness or fullness  Comments: External genitalia is within normal limits  The vagina is evident of estrogen deficiency  Cervix is small  There is no pelvic floor prolapse  Rectovaginal exam is confirmatory  Neurological:      Mental Status: She is alert and oriented to person, place, and time

## 2021-06-04 DIAGNOSIS — I10 ESSENTIAL HYPERTENSION: ICD-10-CM

## 2021-06-04 RX ORDER — OLMESARTAN MEDOXOMIL 20 MG/1
TABLET ORAL
Qty: 180 TABLET | Refills: 0 | Status: SHIPPED | OUTPATIENT
Start: 2021-06-04 | End: 2021-12-13

## 2021-06-29 ENCOUNTER — OFFICE VISIT (OUTPATIENT)
Dept: INTERNAL MEDICINE CLINIC | Facility: CLINIC | Age: 81
End: 2021-06-29
Payer: MEDICARE

## 2021-06-29 VITALS
SYSTOLIC BLOOD PRESSURE: 130 MMHG | WEIGHT: 138 LBS | BODY MASS INDEX: 25.4 KG/M2 | DIASTOLIC BLOOD PRESSURE: 70 MMHG | HEART RATE: 52 BPM | HEIGHT: 62 IN | TEMPERATURE: 97.4 F

## 2021-06-29 DIAGNOSIS — E55.9 VITAMIN D DEFICIENCY: ICD-10-CM

## 2021-06-29 DIAGNOSIS — K86.89 PANCREATIC MASS: ICD-10-CM

## 2021-06-29 DIAGNOSIS — K21.9 GASTROESOPHAGEAL REFLUX DISEASE WITHOUT ESOPHAGITIS: ICD-10-CM

## 2021-06-29 DIAGNOSIS — E78.2 MIXED HYPERLIPIDEMIA: ICD-10-CM

## 2021-06-29 DIAGNOSIS — Z00.00 MEDICARE ANNUAL WELLNESS VISIT, SUBSEQUENT: Primary | ICD-10-CM

## 2021-06-29 DIAGNOSIS — I10 ESSENTIAL HYPERTENSION: ICD-10-CM

## 2021-06-29 PROCEDURE — 1123F ACP DISCUSS/DSCN MKR DOCD: CPT | Performed by: INTERNAL MEDICINE

## 2021-06-29 PROCEDURE — G0438 PPPS, INITIAL VISIT: HCPCS | Performed by: INTERNAL MEDICINE

## 2021-06-29 PROCEDURE — 99214 OFFICE O/P EST MOD 30 MIN: CPT | Performed by: INTERNAL MEDICINE

## 2021-06-29 NOTE — PROGRESS NOTES
Assessment and Plan:     Problem List Items Addressed This Visit        Cardiovascular and Mediastinum    Essential hypertension       Other    Hyperlipemia      Other Visit Diagnoses     Medicare annual wellness visit, subsequent    -  Primary           Preventive health issues were discussed with patient, and age appropriate screening tests were ordered as noted in patient's After Visit Summary  Personalized health advice and appropriate referrals for health education or preventive services given if needed, as noted in patient's After Visit Summary       History of Present Illness:     Patient presents for Medicare Annual Wellness visit    Patient Care Team:  Neha Marrero MD as PCP - General     Problem List:     Patient Active Problem List   Diagnosis    Essential hypertension    Hyperlipemia    Anxiety    Bradycardia    Dizziness    Pancreatic cyst    Vitamin D deficiency    Dysthymia    Gastroesophageal reflux disease without esophagitis      Past Medical and Surgical History:     Past Medical History:   Diagnosis Date    Aortic insufficiency     Cee's esophagus     Gastroesophageal reflux disease     Glaucoma     Hypercholesterolemia     Hyperlipidemia     Hypertension     Kidney stones 2009    Low back pain     Major depressive disorder     Mitral valve insufficiency     Osteoporosis     Osteoporosis     Vitamin D deficiency      Past Surgical History:   Procedure Laterality Date    COLONOSCOPY  06/02/2011    Normal     CYSTOSCOPY  2009    Polyp, stones    MAMMO (HISTORICAL)  09/19/2016    Negative     OTHER SURGICAL HISTORY      Infection in arm       Family History:     Family History   Problem Relation Age of Onset    Cancer Mother 80        unknown type of abdominal cancer    No Known Problems Father     No Known Problems Sister     No Known Problems Daughter     No Known Problems Maternal Grandmother     No Known Problems Maternal Grandfather     No Known Problems Paternal Grandmother     No Known Problems Paternal Grandfather     No Known Problems Daughter     No Known Problems Son     Lung cancer Brother 79    Lung cancer Brother 72      Social History:     Social History     Socioeconomic History    Marital status: /Civil Union     Spouse name: None    Number of children: None    Years of education: None    Highest education level: None   Occupational History    None   Tobacco Use    Smoking status: Never Smoker    Smokeless tobacco: Never Used   Vaping Use    Vaping Use: Never used   Substance and Sexual Activity    Alcohol use: No    Drug use: No    Sexual activity: Not Currently   Other Topics Concern    None   Social History Narrative    None     Social Determinants of Health     Financial Resource Strain:     Difficulty of Paying Living Expenses:    Food Insecurity:     Worried About Running Out of Food in the Last Year:     Ran Out of Food in the Last Year:    Transportation Needs:     Lack of Transportation (Medical):      Lack of Transportation (Non-Medical):    Physical Activity:     Days of Exercise per Week:     Minutes of Exercise per Session:    Stress:     Feeling of Stress :    Social Connections:     Frequency of Communication with Friends and Family:     Frequency of Social Gatherings with Friends and Family:     Attends [employer] Services:     Active Member of Clubs or Organizations:     Attends Club or Organization Meetings:     Marital Status:    Intimate Partner Violence:     Fear of Current or Ex-Partner:     Emotionally Abused:     Physically Abused:     Sexually Abused:       Medications and Allergies:     Current Outpatient Medications   Medication Sig Dispense Refill    amLODIPine (NORVASC) 5 mg tablet TAKE 1 TAB DAILY 90 tablet 1    Ascorbic Acid (vitamin C) 1000 MG tablet Take 1,000 mg by mouth daily      aspirin (ECOTRIN LOW STRENGTH) 81 mg EC tablet Take 81 mg by mouth daily      cholecalciferol (VITAMIN D3) 25 mcg (1,000 units) tablet Take 1,000 Units by mouth daily      citalopram (CeleXA) 20 mg tablet TAKE 1/2 OR 1 TAB DAILY AS DIRECTED BY DOCTOR "GENERIC CELEXA" 90 tablet 3    dorzolamide (TRUSOPT) 2 % ophthalmic solution       Lumigan 0 01 % ophthalmic drops       olmesartan (BENICAR) 20 mg tablet TAKE 1 TAB ONCE A DAY EXCEPT FOR HIGH BP TAKE 2 TABS IF ABOVE 468 FOR SYSTOLIC 141 tablet 0    omeprazole (PriLOSEC) 20 mg delayed release capsule       rosuvastatin (CRESTOR) 5 mg tablet TAKE 1 TAB AT BEDTIME 90 tablet 0    Travatan Z 0 004 % ophthalmic solution        No current facility-administered medications for this visit  No Known Allergies   Immunizations:     Immunization History   Administered Date(s) Administered    INFLUENZA 10/21/2009, 09/09/2020    Pneumococcal Polysaccharide PPV23 06/24/2011    SARS-CoV-2 / COVID-19 mRNA IM (Pf[employer]-BioNT[employer]) 03/11/2021, 04/01/2021    Tdap 04/24/2013    influenza, trivalent, adjuvanted 09/09/2020      Health Maintenance: There are no preventive care reminders to display for this patient  There are no preventive care reminders to display for this patient  Medicare Health Risk Assessment:     /70   Pulse (!) 52   Temp (!) 97 4 °F (36 3 °C)   Ht 5' 2" (1 575 m)   Wt 62 6 kg (138 lb)   BMI 25 24 kg/m²      Telly Charles is here for her Subsequent Wellness visit  Last Medicare Wellness visit information reviewed, patient interviewed, no change since last AWV  Health Risk Assessment:   Patient rates overall health as good  Patient feels that their physical health rating is slightly worse  Patient is satisfied with their life  Eyesight was rated as same  Hearing was rated as same  Patient feels that their emotional and mental health rating is same  Patients states they are never, rarely angry  Patient states they are sometimes unusually tired/fatigued  Pain experienced in the last 7 days has been none   Patient states that she has experienced no weight loss or gain in last 6 months  Fall Risk Screening: In the past year, patient has experienced: no history of falling in past year      Urinary Incontinence Screening:   Patient has not leaked urine accidently in the last six months  Home Safety:  Patient does not have trouble with stairs inside or outside of their home  Patient has working smoke alarms and has working carbon monoxide detector  Home safety hazards include: none  Nutrition:   Current diet is Regular  Medications:   Patient is currently taking over-the-counter supplements  OTC medications include: see medication list  Patient is able to manage medications  Activities of Daily Living (ADLs)/Instrumental Activities of Daily Living (IADLs):   Walk and transfer into and out of bed and chair?: Yes  Dress and groom yourself?: Yes    Bathe or shower yourself?: Yes    Feed yourself? Yes  Do your laundry/housekeeping?: Yes  Manage your money, pay your bills and track your expenses?: Yes  Make your own meals?: Yes    Do your own shopping?: Yes    Previous Hospitalizations:   Any hospitalizations or ED visits within the last 12 months?: No      Advance Care Planning:   Living will: Yes    Advanced directive: Yes      PREVENTIVE SCREENINGS      Cardiovascular Screening:    General: Screening Not Indicated and History Lipid Disorder      Diabetes Screening:     General: Screening Current      Breast Cancer Screening:     General: Screening Current      Cervical Cancer Screening:    General: Screening Not Indicated      Lung Cancer Screening:     General: Screening Not Indicated    Screening, Brief Intervention, and Referral to Treatment (SBIRT)    Screening  Typical number of drinks in a day: 0  Typical number of drinks in a week: 0  Interpretation: Low risk drinking behavior      Single Item Drug Screening:  How often have you used an illegal drug (including marijuana) or a prescription medication for non-medical reasons in the past year? never    Single Item Drug Screen Score: 0  Interpretation: Negative screen for possible drug use disorder    Other Counseling Topics:   Car/seat belt/driving safety, skin self-exam, sunscreen and regular weightbearing exercise and calcium and vitamin D intake         Raina Campbell MD

## 2021-06-29 NOTE — PATIENT INSTRUCTIONS
Medicare Preventive Visit Patient Instructions  Thank you for completing your Welcome to Medicare Visit or Medicare Annual Wellness Visit today  Your next wellness visit will be due in one year (6/30/2022)  The screening/preventive services that you may require over the next 5-10 years are detailed below  Some tests may not apply to you based off risk factors and/or age  Screening tests ordered at today's visit but not completed yet may show as past due  Also, please note that scanned in results may not display below  Preventive Screenings:  Service Recommendations Previous Testing/Comments   Colorectal Cancer Screening  * Colonoscopy    * Fecal Occult Blood Test (FOBT)/Fecal Immunochemical Test (FIT)  * Fecal DNA/Cologuard Test  * Flexible Sigmoidoscopy Age: 54-65 years old   Colonoscopy: every 10 years (may be performed more frequently if at higher risk)  OR  FOBT/FIT: every 1 year  OR  Cologuard: every 3 years  OR  Sigmoidoscopy: every 5 years  Screening may be recommended earlier than age 48 if at higher risk for colorectal cancer  Also, an individualized decision between you and your healthcare provider will decide whether screening between the ages of 74-80 would be appropriate  Colonoscopy: Not on file  FOBT/FIT: Not on file  Cologuard: Not on file  Sigmoidoscopy: Not on file          Breast Cancer Screening Age: 36 years old  Frequency: every 1-2 years  Not required if history of left and right mastectomy Mammogram: 11/11/2020    Screening Current   Cervical Cancer Screening Between the ages of 21-29, pap smear recommended once every 3 years  Between the ages of 33-67, can perform pap smear with HPV co-testing every 5 years     Recommendations may differ for women with a history of total hysterectomy, cervical cancer, or abnormal pap smears in past  Pap Smear: 05/12/2021    Screening Not Indicated   Hepatitis C Screening Once for adults born between 1945 and 1965  More frequently in patients at high risk for Hepatitis C Hep C Antibody: Not on file        Diabetes Screening 1-2 times per year if you're at risk for diabetes or have pre-diabetes Fasting glucose: 111 mg/dL   A1C: No results in last 5 years    Screening Current   Cholesterol Screening Once every 5 years if you don't have a lipid disorder  May order more often based on risk factors  Lipid panel: 02/10/2020    Screening Not Indicated  History Lipid Disorder     Other Preventive Screenings Covered by Medicare:  1  Abdominal Aortic Aneurysm (AAA) Screening: covered once if your at risk  You're considered to be at risk if you have a family history of AAA  2  Lung Cancer Screening: covers low dose CT scan once per year if you meet all of the following conditions: (1) Age 50-69; (2) No signs or symptoms of lung cancer; (3) Current smoker or have quit smoking within the last 15 years; (4) You have a tobacco smoking history of at least 30 pack years (packs per day multiplied by number of years you smoked); (5) You get a written order from a healthcare provider  3  Glaucoma Screening: covered annually if you're considered high risk: (1) You have diabetes OR (2) Family history of glaucoma OR (3)  aged 48 and older OR (3)  American aged 72 and older  3  Osteoporosis Screening: covered every 2 years if you meet one of the following conditions: (1) You're estrogen deficient and at risk for osteoporosis based off medical history and other findings; (2) Have a vertebral abnormality; (3) On glucocorticoid therapy for more than 3 months; (4) Have primary hyperparathyroidism; (5) On osteoporosis medications and need to assess response to drug therapy  · Last bone density test (DXA Scan): 09/08/2014   5  HIV Screening: covered annually if you're between the age of 15-65  Also covered annually if you are younger than 13 and older than 72 with risk factors for HIV infection   For pregnant patients, it is covered up to 3 times per pregnancy  Immunizations:  Immunization Recommendations   Influenza Vaccine Annual influenza vaccination during flu season is recommended for all persons aged >= 6 months who do not have contraindications   Pneumococcal Vaccine (Prevnar and Pneumovax)  * Prevnar = PCV13  * Pneumovax = PPSV23   Adults 25-60 years old: 1-3 doses may be recommended based on certain risk factors  Adults 72 years old: Prevnar (PCV13) vaccine recommended followed by Pneumovax (PPSV23) vaccine  If already received PPSV23 since turning 65, then PCV13 recommended at least one year after PPSV23 dose  Hepatitis B Vaccine 3 dose series if at intermediate or high risk (ex: diabetes, end stage renal disease, liver disease)   Tetanus (Td) Vaccine - COST NOT COVERED BY MEDICARE PART B Following completion of primary series, a booster dose should be given every 10 years to maintain immunity against tetanus  Td may also be given as tetanus wound prophylaxis  Tdap Vaccine - COST NOT COVERED BY MEDICARE PART B Recommended at least once for all adults  For pregnant patients, recommended with each pregnancy  Shingles Vaccine (Shingrix) - COST NOT COVERED BY MEDICARE PART B  2 shot series recommended in those aged 48 and above     Health Maintenance Due:  There are no preventive care reminders to display for this patient  Immunizations Due:  There are no preventive care reminders to display for this patient  Advance Directives   What are advance directives? Advance directives are legal documents that state your wishes and plans for medical care  These plans are made ahead of time in case you lose your ability to make decisions for yourself  Advance directives can apply to any medical decision, such as the treatments you want, and if you want to donate organs  What are the types of advance directives? There are many types of advance directives, and each state has rules about how to use them   You may choose a combination of any of the following:  · Living will: This is a written record of the treatment you want  You can also choose which treatments you do not want, which to limit, and which to stop at a certain time  This includes surgery, medicine, IV fluid, and tube feedings  · Durable power of  for healthcare [facility]): This is a written record that states who you want to make healthcare choices for you when you are unable to make them for yourself  This person, called a proxy, is usually a family member or a friend  You may choose more than 1 proxy  · Do not resuscitate (DNR) order:  A DNR order is used in case your heart stops beating or you stop breathing  It is a request not to have certain forms of treatment, such as CPR  A DNR order may be included in other types of advance directives  · Medical directive: This covers the care that you want if you are in a coma, near death, or unable to make decisions for yourself  You can list the treatments you want for each condition  Treatment may include pain medicine, surgery, blood transfusions, dialysis, IV or tube feedings, and a ventilator (breathing machine)  · Values history: This document has questions about your views, beliefs, and how you feel and think about life  This information can help others choose the care that you would choose  Why are advance directives important? An advance directive helps you control your care  Although spoken wishes may be used, it is better to have your wishes written down  Spoken wishes can be misunderstood, or not followed  Treatments may be given even if you do not want them  An advance directive may make it easier for your family to make difficult choices about your care  Weight Management   Why it is important to manage your weight:  Being overweight increases your risk of health conditions such as heart disease, high blood pressure, type 2 diabetes, and certain types of cancer   It can also increase your risk for osteoarthritis, sleep apnea, and other respiratory problems  Aim for a slow, steady weight loss  Even a small amount of weight loss can lower your risk of health problems  How to lose weight safely:  A safe and healthy way to lose weight is to eat fewer calories and get regular exercise  You can lose up about 1 pound a week by decreasing the number of calories you eat by 500 calories each day  Healthy meal plan for weight management:  A healthy meal plan includes a variety of foods, contains fewer calories, and helps you stay healthy  A healthy meal plan includes the following:  · Eat whole-grain foods more often  A healthy meal plan should contain fiber  Fiber is the part of grains, fruits, and vegetables that is not broken down by your body  Whole-grain foods are healthy and provide extra fiber in your diet  Some examples of whole-grain foods are whole-wheat breads and pastas, oatmeal, brown rice, and bulgur  · Eat a variety of vegetables every day  Include dark, leafy greens such as spinach, kale, tonja greens, and mustard greens  Eat yellow and orange vegetables such as carrots, sweet potatoes, and winter squash  · Eat a variety of fruits every day  Choose fresh or canned fruit (canned in its own juice or light syrup) instead of juice  Fruit juice has very little or no fiber  · Eat low-fat dairy foods  Drink fat-free (skim) milk or 1% milk  Eat fat-free yogurt and low-fat cottage cheese  Try low-fat cheeses such as mozzarella and other reduced-fat cheeses  · Choose meat and other protein foods that are low in fat  Choose beans or other legumes such as split peas or lentils  Choose fish, skinless poultry (chicken or turkey), or lean cuts of red meat (beef or pork)  Before you cook meat or poultry, cut off any visible fat  · Use less fat and oil  Try baking foods instead of frying them  Add less fat, such as margarine, sour cream, regular salad dressing and mayonnaise to foods  Eat fewer high-fat foods   Some examples of high-fat foods include french fries, doughnuts, ice cream, and cakes  · Eat fewer sweets  Limit foods and drinks that are high in sugar  This includes candy, cookies, regular soda, and sweetened drinks  Exercise:  Exercise at least 30 minutes per day on most days of the week  Some examples of exercise include walking, biking, dancing, and swimming  You can also fit in more physical activity by taking the stairs instead of the elevator or parking farther away from stores  Ask your healthcare provider about the best exercise plan for you  © Copyright [employer] 2018 Information is for End User's use only and may not be sold, redistributed or otherwise used for commercial purposes   All illustrations and images included in CareNotes® are the copyrighted property of A D A M , Inc  or [address] [place], [place] [address]emely merissa

## 2021-06-29 NOTE — PROGRESS NOTES
Assessment/Plan: This is 79-year-old lady with a history of hypertension hyperlipidemia GERD Cee's esophagus glaucoma nephrolithiasis osteoporosis vitamin-D deficiency and a pancreatic lesion  She denies any chest pain or shortness of breath  1  Medicare annual wellness visit, subsequent    2  Essential hypertension  -     CBC and differential; Future  -     Comprehensive metabolic panel; Future  -     Lipid panel; Future  -     UA (URINE) with reflex to Scope  -     TSH, 3rd generation; Future    3  Mixed hyperlipidemia    4  Vitamin D deficiency    5  Gastroesophageal reflux disease without esophagitis    6  Pancreatic mass  -     MRI abdomen w wo contrast; Future; Expected date: 11/22/2021           1  Medicare annual wellness visit, subsequent      2  Essential hypertension      3  Mixed hyperlipidemia             Subjective:      Patient ID: Katiana Steiner is a 79 y o  female  This is 79-year-old lady with a history of hypertension hyperlipidemia GERD Cee's esophagus glaucoma nephrolithiasis osteoporosis vitamin-D deficiency and a pancreatic lesion  She denies any chest pain or shortness of breath  The following portions of the patient's history were reviewed and updated as appropriate: She  has a past medical history of Aortic insufficiency, Cee's esophagus, Gastroesophageal reflux disease, Glaucoma, Hypercholesterolemia, Hyperlipidemia, Hypertension, Kidney stones (2009), Low back pain, Major depressive disorder, Mitral valve insufficiency, Osteoporosis, Osteoporosis, and Vitamin D deficiency    She   Patient Active Problem List    Diagnosis Date Noted    Pancreatic cyst 03/19/2021    Vitamin D deficiency 03/19/2021    Dysthymia 03/19/2021    Gastroesophageal reflux disease without esophagitis 03/19/2021    Essential hypertension 01/19/2018    Hyperlipemia 01/19/2018    Anxiety 01/19/2018    Bradycardia 01/19/2018    Dizziness 01/19/2018     She  has a past surgical history that includes Cystoscopy (2009); Other surgical history; Colonoscopy (06/02/2011); and Mammo (historical) (09/19/2016)  Her family history includes Cancer (age of onset: 80) in her mother; Lung cancer (age of onset: 72) in her brother; Lung cancer (age of onset: 79) in her brother; No Known Problems in her daughter, daughter, father, maternal grandfather, maternal grandmother, paternal grandfather, paternal grandmother, sister, and son  She  reports that she has never smoked  She has never used smokeless tobacco  She reports that she does not drink alcohol and does not use drugs  Current Outpatient Medications   Medication Sig Dispense Refill    amLODIPine (NORVASC) 5 mg tablet TAKE 1 TAB DAILY 90 tablet 1    Ascorbic Acid (vitamin C) 1000 MG tablet Take 1,000 mg by mouth daily      aspirin (ECOTRIN LOW STRENGTH) 81 mg EC tablet Take 81 mg by mouth daily      cholecalciferol (VITAMIN D3) 25 mcg (1,000 units) tablet Take 1,000 Units by mouth daily      citalopram (CeleXA) 20 mg tablet TAKE 1/2 OR 1 TAB DAILY AS DIRECTED BY DOCTOR "GENERIC CELEXA" 90 tablet 3    dorzolamide (TRUSOPT) 2 % ophthalmic solution       Lumigan 0 01 % ophthalmic drops       olmesartan (BENICAR) 20 mg tablet TAKE 1 TAB ONCE A DAY EXCEPT FOR HIGH BP TAKE 2 TABS IF ABOVE 216 FOR SYSTOLIC 482 tablet 0    omeprazole (PriLOSEC) 20 mg delayed release capsule       rosuvastatin (CRESTOR) 5 mg tablet TAKE 1 TAB AT BEDTIME 90 tablet 0    Travatan Z 0 004 % ophthalmic solution        No current facility-administered medications for this visit       Current Outpatient Medications on File Prior to Visit   Medication Sig    amLODIPine (NORVASC) 5 mg tablet TAKE 1 TAB DAILY    Ascorbic Acid (vitamin C) 1000 MG tablet Take 1,000 mg by mouth daily    aspirin (ECOTRIN LOW STRENGTH) 81 mg EC tablet Take 81 mg by mouth daily    cholecalciferol (VITAMIN D3) 25 mcg (1,000 units) tablet Take 1,000 Units by mouth daily    citalopram (CeleXA) 20 mg tablet TAKE 1/2 OR 1 TAB DAILY AS DIRECTED BY DOCTOR "GENERIC CELEXA"    dorzolamide (TRUSOPT) 2 % ophthalmic solution     Lumigan 0 01 % ophthalmic drops     olmesartan (BENICAR) 20 mg tablet TAKE 1 TAB ONCE A DAY EXCEPT FOR HIGH BP TAKE 2 TABS IF ABOVE 998 FOR SYSTOLIC    omeprazole (PriLOSEC) 20 mg delayed release capsule     rosuvastatin (CRESTOR) 5 mg tablet TAKE 1 TAB AT BEDTIME    Travatan Z 0 004 % ophthalmic solution      No current facility-administered medications on file prior to visit  She has No Known Allergies       Review of Systems   Constitutional: Negative for appetite change, chills, fatigue and fever  HENT: Negative for sore throat and trouble swallowing  Eyes: Negative for redness  Respiratory: Negative for shortness of breath  Cardiovascular: Negative for chest pain and palpitations  Gastrointestinal: Negative for abdominal pain, constipation and diarrhea  Genitourinary: Negative for dysuria and hematuria  Musculoskeletal: Negative for back pain and neck pain  Skin: Negative for rash  Neurological: Negative for seizures, weakness and headaches  Hematological: Negative for adenopathy  Psychiatric/Behavioral: Negative for confusion  The patient is not nervous/anxious            Objective:      /70   Pulse (!) 52   Temp (!) 97 4 °F (36 3 °C)   Ht 5' 2" (1 575 m)   Wt 62 6 kg (138 lb)   BMI 25 24 kg/m²     Recent Results (from the past 1344 hour(s))   Urine culture    Collection Time: 05/07/21  1:42 PM    Specimen: Urine, Clean Catch   Result Value Ref Range    Urine Culture <10,000 cfu/ml     Urinalysis with microscopic    Collection Time: 05/10/21  3:56 PM   Result Value Ref Range    Clarity, UA Clear     Color, UA Yellow     Specific [place], UA 1 005 1 003 - 1 030    pH, UA 6 0 4 5, 5 0, 5 5, 6 0, 6 5, 7 0, 7 5, 8 0    Glucose, UA Negative Negative mg/dl    Ketones, UA Negative Negative mg/dl    Blood, UA Negative Negative Protein, UA Negative Negative mg/dl    Nitrite, UA Negative Negative    Bilirubin, UA Negative Negative    Urobilinogen, UA 0 2 0 2, 1 0 E U /dl E U /dl    Leukocytes, UA Negative Negative    WBC, UA None Seen None Seen, 2-4 /hpf    RBC, UA None Seen None Seen, 2-4 /hpf    Bacteria, UA None Seen None Seen, Occasional /hpf    Epithelial Cells None Seen None Seen, Occasional /hpf   Urine culture    Collection Time: 05/10/21  3:57 PM    Specimen: Urine, Clean Catch   Result Value Ref Range    Urine Culture <10,000 cfu/ml Gram Negative Jeremy (A)         Physical Exam  Constitutional:       General: She is not in acute distress  Appearance: Normal appearance  HENT:      Head: Normocephalic and atraumatic  Nose: Nose normal       Mouth/Throat:      Mouth: Mucous membranes are moist    Eyes:      Extraocular Movements: Extraocular movements intact  Pupils: Pupils are equal, round, and reactive to light  Cardiovascular:      Rate and Rhythm: Normal rate and regular rhythm  Pulses: Normal pulses  Heart sounds: Normal heart sounds  No murmur heard  No friction rub  Pulmonary:      Effort: Pulmonary effort is normal  No respiratory distress  Breath sounds: Normal breath sounds  No wheezing  Abdominal:      General: Abdomen is flat  Bowel sounds are normal  There is no distension  Palpations: Abdomen is soft  There is no mass  Tenderness: There is no abdominal tenderness  There is no guarding  Musculoskeletal:         General: Normal range of motion  Cervical back: Normal range of motion and neck supple  Neurological:      General: No focal deficit present  Mental Status: She is alert and oriented to person, place, and time  Mental status is at baseline  Cranial Nerves: No cranial nerve deficit     Psychiatric:         Mood and Affect: Mood normal          Behavior: Behavior normal

## 2021-08-06 ENCOUNTER — APPOINTMENT (OUTPATIENT)
Dept: LAB | Facility: HOSPITAL | Age: 81
End: 2021-08-06
Attending: INTERNAL MEDICINE
Payer: MEDICARE

## 2021-08-06 DIAGNOSIS — I10 ESSENTIAL HYPERTENSION: ICD-10-CM

## 2021-08-06 LAB
ALBUMIN SERPL BCP-MCNC: 3.4 G/DL (ref 3.5–5)
ALP SERPL-CCNC: 65 U/L (ref 46–116)
ALT SERPL W P-5'-P-CCNC: 20 U/L (ref 12–78)
ANION GAP SERPL CALCULATED.3IONS-SCNC: 5 MMOL/L (ref 4–13)
AST SERPL W P-5'-P-CCNC: 16 U/L (ref 5–45)
BACTERIA UR QL AUTO: ABNORMAL /HPF
BASOPHILS # BLD MANUAL: 0.09 THOUSAND/UL (ref 0–0.1)
BASOPHILS NFR MAR MANUAL: 2 % (ref 0–1)
BILIRUB SERPL-MCNC: 0.4 MG/DL (ref 0.2–1)
BILIRUB UR QL STRIP: NEGATIVE
BUN SERPL-MCNC: 14 MG/DL (ref 5–25)
CALCIUM ALBUM COR SERPL-MCNC: 9.5 MG/DL (ref 8.3–10.1)
CALCIUM SERPL-MCNC: 9 MG/DL (ref 8.3–10.1)
CHLORIDE SERPL-SCNC: 113 MMOL/L (ref 100–108)
CHOLEST SERPL-MCNC: 294 MG/DL (ref 50–200)
CLARITY UR: CLEAR
CO2 SERPL-SCNC: 22 MMOL/L (ref 21–32)
COLOR UR: YELLOW
CREAT SERPL-MCNC: 0.82 MG/DL (ref 0.6–1.3)
EOSINOPHIL # BLD MANUAL: 0 THOUSAND/UL (ref 0–0.4)
EOSINOPHIL NFR BLD MANUAL: 0 % (ref 0–6)
ERYTHROCYTE [DISTWIDTH] IN BLOOD BY AUTOMATED COUNT: 12.6 % (ref 11.6–15.1)
GFR SERPL CREATININE-BSD FRML MDRD: 67 ML/MIN/1.73SQ M
GLUCOSE P FAST SERPL-MCNC: 112 MG/DL (ref 65–99)
GLUCOSE UR STRIP-MCNC: NEGATIVE MG/DL
HCT VFR BLD AUTO: 40.2 % (ref 34.8–46.1)
HDLC SERPL-MCNC: 57 MG/DL
HGB BLD-MCNC: 13.3 G/DL (ref 11.5–15.4)
HGB UR QL STRIP.AUTO: ABNORMAL
HYALINE CASTS #/AREA URNS LPF: ABNORMAL /LPF
KETONES UR STRIP-MCNC: NEGATIVE MG/DL
LDLC SERPL CALC-MCNC: 180 MG/DL (ref 0–100)
LEUKOCYTE ESTERASE UR QL STRIP: ABNORMAL
LYMPHOCYTES # BLD AUTO: 2.03 THOUSAND/UL (ref 0.6–4.47)
LYMPHOCYTES # BLD AUTO: 43 % (ref 14–44)
MCH RBC QN AUTO: 32.3 PG (ref 26.8–34.3)
MCHC RBC AUTO-ENTMCNC: 33.1 G/DL (ref 31.4–37.4)
MCV RBC AUTO: 98 FL (ref 82–98)
MONOCYTES # BLD AUTO: 0.09 THOUSAND/UL (ref 0–1.22)
MONOCYTES NFR BLD: 2 % (ref 4–12)
NEUTROPHILS # BLD MANUAL: 2.51 THOUSAND/UL (ref 1.85–7.62)
NEUTS SEG NFR BLD AUTO: 53 % (ref 43–75)
NITRITE UR QL STRIP: NEGATIVE
NON-SQ EPI CELLS URNS QL MICRO: ABNORMAL /HPF
NONHDLC SERPL-MCNC: 237 MG/DL
NRBC BLD AUTO-RTO: 0 /100 WBCS
PH UR STRIP.AUTO: 5.5 [PH]
PLATELET # BLD AUTO: 232 THOUSANDS/UL (ref 149–390)
PLATELET BLD QL SMEAR: ADEQUATE
PMV BLD AUTO: 10 FL (ref 8.9–12.7)
POTASSIUM SERPL-SCNC: 4.4 MMOL/L (ref 3.5–5.3)
PROT SERPL-MCNC: 7.8 G/DL (ref 6.4–8.2)
PROT UR STRIP-MCNC: NEGATIVE MG/DL
RBC # BLD AUTO: 4.12 MILLION/UL (ref 3.81–5.12)
RBC #/AREA URNS AUTO: ABNORMAL /HPF
RBC MORPH BLD: NORMAL
SODIUM SERPL-SCNC: 140 MMOL/L (ref 136–145)
SP GR UR STRIP.AUTO: 1.02 (ref 1–1.03)
TOTAL CELLS COUNTED SPEC: 100
TRIGL SERPL-MCNC: 287 MG/DL
TSH SERPL DL<=0.05 MIU/L-ACNC: 1.87 UIU/ML (ref 0.36–3.74)
UROBILINOGEN UR QL STRIP.AUTO: 0.2 E.U./DL
WBC # BLD AUTO: 4.73 THOUSAND/UL (ref 4.31–10.16)
WBC #/AREA URNS AUTO: ABNORMAL /HPF

## 2021-08-06 PROCEDURE — 84443 ASSAY THYROID STIM HORMONE: CPT

## 2021-08-06 PROCEDURE — 36415 COLL VENOUS BLD VENIPUNCTURE: CPT

## 2021-08-06 PROCEDURE — 80053 COMPREHEN METABOLIC PANEL: CPT

## 2021-08-06 PROCEDURE — 81001 URINALYSIS AUTO W/SCOPE: CPT | Performed by: INTERNAL MEDICINE

## 2021-08-06 PROCEDURE — 85007 BL SMEAR W/DIFF WBC COUNT: CPT

## 2021-08-06 PROCEDURE — 85027 COMPLETE CBC AUTOMATED: CPT

## 2021-08-06 PROCEDURE — 80061 LIPID PANEL: CPT

## 2021-08-16 ENCOUNTER — OFFICE VISIT (OUTPATIENT)
Dept: CARDIOLOGY CLINIC | Facility: CLINIC | Age: 81
End: 2021-08-16
Payer: MEDICARE

## 2021-08-16 VITALS
HEIGHT: 62 IN | DIASTOLIC BLOOD PRESSURE: 62 MMHG | HEART RATE: 58 BPM | BODY MASS INDEX: 25.86 KG/M2 | WEIGHT: 140.5 LBS | SYSTOLIC BLOOD PRESSURE: 128 MMHG

## 2021-08-16 DIAGNOSIS — I10 ESSENTIAL HYPERTENSION: ICD-10-CM

## 2021-08-16 DIAGNOSIS — R07.9 CHEST PAIN, UNSPECIFIED TYPE: ICD-10-CM

## 2021-08-16 DIAGNOSIS — R00.1 BRADYCARDIA: ICD-10-CM

## 2021-08-16 DIAGNOSIS — E78.2 MIXED HYPERLIPIDEMIA: Primary | ICD-10-CM

## 2021-08-16 PROCEDURE — 99214 OFFICE O/P EST MOD 30 MIN: CPT | Performed by: INTERNAL MEDICINE

## 2021-08-16 RX ORDER — ROSUVASTATIN CALCIUM 5 MG/1
5 TABLET, COATED ORAL
Qty: 90 TABLET | Refills: 1 | Status: SHIPPED | OUTPATIENT
Start: 2021-08-16 | End: 2022-05-26

## 2021-08-16 NOTE — PROGRESS NOTES
[facility] CARDIOLOGY [facility]  [address] [facility] [address]  Phone#  [phone]  Fax#  [phone]                                               Cardiology Office Follow up  Calvin Mcgrath, 79 y o  female  YOB: 1940  MRN: [id number] Encounter: [id number]      PCP - Kezia Pantoja MD    Assessment  1  Chest pain  · Nuclear Rx stress - 4/14/21 -   EF 70%, no significant perfusion defect  · Nuclear Rx stress - 2015 - LVEF 80%, no perfusion defects, stress ECG unchanged  2  Abnormal ECG  3  Bradycardia  4  Hypertension   · Echo - 1/19/2018 - LVEF 65%, grade 1 diastolic dysfunction, mild AI  5  Hyperlipidemia  6  Pancreatic cyst  · 10x8 mm in pancreatic tail on MRI abdomen  7  Cee's esophagus / GERD  8   Chronic back pain    Plan  Chest pain, abnormal ECG  · ECG today shows T wave inversions in anterolateral leads concerning for ischemia  · Nuclear stress test in April 2021 was without any significant ischemia or infarct   · Chest pain is otherwise improved, and was possibly related to GERD   · Monitor clinically  · She now reports left shoulder pain with raising arm -->Tylenol p r n , and follow-up with PCP    Bradycardia  · Holter in 4/2021, had sinus bradycardia HR 38-96 bpm, no symptoms  · HR today 58  · No symptoms of near-syncope or syncope, or any significant dizziness/fatigue  · Monitor    Hypertension  ·  blood pressure well controlled, 128/62 today   · Continue amlodipine 5 mg, olmesartan 20 mg daily    Hyperlipidemia    4/28/2018 09:35 9/24/2018 11:35 4/9/2019 10:08 2/10/2020 08:34 8/6/2021 09:53   Cholesterol 260 (H) 240 (H) 244 (H) 215 (H) 294 (H)   Triglycerides 185 (H) 127 109 167 (H) 287 (H)   HDL 64 (H) 68 (H) 68 (H) 63 57   Non-HDL Cholesterol 196 172  152 237   LDL Calculated 159 (H) 147 (H) 154 (H) 119 (H) 180 (H)     ·  cholesterol levels continued to be very uncontrolled  · She was supposed to be on rosuvastatin 5 mg daily, but seems to have stopped it for unclear reasons   · Reports mild fatigue with it in past  · Resume rosuvastatin at 5 mg daily, and uptitrate to 10 mg daily  · Repeat lipid panel in 4 months    ECG today -  No results found for this visit on 08/16/21  Orders Placed This Encounter   Procedures    Lipid Panel with Direct LDL reflex     Return in about 6 months (around 2/16/2022), or if symptoms worsen or fail to improve  History of Present Illness   79-year-old female, who lives independently, and is fairly active for her age, comes in as a new patient for reestablishment of care  She previously used to see Dr Sary Pfeiffer as several years ago, but has not seen any cardiologist recently  She reports ongoing complains of intermittent chest discomfort, located substernally, typically lasting for a few minutes  There is no clear exertional component to it  She is unable to clarify precipitating factors, but it appears to be happening more at night  No associated symptoms of palpitations, dizziness or lightheadedness, shortness of breath  She saw her PCP recently, and was noted to have bradycardia  She reports occasional complains of palpitations as well, and as a result she was referred for a Holter monitor, which he has not yet completed  No history of recent syncope  Interval history - 8/16/2021   she comes back for follow-up after about 4 months  Her chest pain has since resolved, and she completed nuclear stress testing which was without any significant ischemia  She reports ongoing symptoms of fatigue, as well as left shoulder pain with raising left arm for the past few days  She has otherwise remained active and is doing well    No complains of shortness of breath, dizziness, lightheadedness, near-syncope or syncope      Historical Information   Past Medical History:   Diagnosis Date    Aortic insufficiency     Cee's esophagus     Gastroesophageal reflux disease     Glaucoma     Hypercholesterolemia     Hyperlipidemia     Hypertension     Kidney stones 2009    Low back pain     Major depressive disorder     Mitral valve insufficiency     Osteoporosis     Osteoporosis     Vitamin D deficiency      Past Surgical History:   Procedure Laterality Date    COLONOSCOPY  06/02/2011    Normal     CYSTOSCOPY  2009    Polyp, stones    MAMMO (HISTORICAL)  09/19/2016    Negative     OTHER SURGICAL HISTORY      Infection in arm      Family History   Problem Relation Age of Onset    Cancer Mother 80        unknown type of abdominal cancer    No Known Problems Father     No Known Problems Sister     No Known Problems Daughter     No Known Problems Maternal Grandmother     No Known Problems Maternal Grandfather     No Known Problems Paternal Grandmother     No Known Problems Paternal Grandfather     No Known Problems Daughter     No Known Problems Son     Lung cancer Brother 79    Lung cancer Brother 72     Current Outpatient Medications on File Prior to Visit   Medication Sig Dispense Refill    amLODIPine (NORVASC) 5 mg tablet TAKE 1 TAB DAILY 90 tablet 1    Ascorbic Acid (vitamin C) 1000 MG tablet Take 1,000 mg by mouth daily      aspirin (ECOTRIN LOW STRENGTH) 81 mg EC tablet Take 81 mg by mouth daily       cholecalciferol (VITAMIN D3) 25 mcg (1,000 units) tablet Take 1,000 Units by mouth daily      citalopram (CeleXA) 20 mg tablet TAKE 1/2 OR 1 TAB DAILY AS DIRECTED BY DOCTOR "GENERIC CELEXA" 90 tablet 3    dorzolamide (TRUSOPT) 2 % ophthalmic solution       Lumigan 0 01 % ophthalmic drops       olmesartan (BENICAR) 20 mg tablet TAKE 1 TAB ONCE A DAY EXCEPT FOR HIGH BP TAKE 2 TABS IF ABOVE 924 FOR SYSTOLIC 353 tablet 0    omeprazole (PriLOSEC) 20 mg delayed release capsule       Travatan Z 0 004 % ophthalmic solution       [DISCONTINUED] rosuvastatin (CRESTOR) 5 mg tablet TAKE 1 TAB AT BEDTIME (Patient not taking: Reported on 8/16/2021) 90 tablet 0     No current facility-administered medications on file prior to visit  No Known Allergies  Social History     Socioeconomic History    Marital status: /Civil Union     Spouse name: None    Number of children: None    Years of education: None    Highest education level: None   Occupational History    None   Tobacco Use    Smoking status: Never Smoker    Smokeless tobacco: Never Used   Vaping Use    Vaping Use: Never used   Substance and Sexual Activity    Alcohol use: No    Drug use: No    Sexual activity: Not Currently   Other Topics Concern    None   Social History Narrative    None     Social Determinants of Health     Financial Resource Strain:     Difficulty of Paying Living Expenses:    Food Insecurity:     Worried About Running Out of Food in the Last Year:     Ran Out of Food in the Last Year:    Transportation Needs:     Lack of Transportation (Medical):  Lack of Transportation (Non-Medical):    Physical Activity:     Days of Exercise per Week:     Minutes of Exercise per Session:    Stress:     Feeling of Stress :    Social Connections:     Frequency of Communication with Friends and Family:     Frequency of Social Gatherings with Friends and Family:     Attends Sabianism Services:     Active Member of Clubs or Organizations:     Attends Club or Organization Meetings:     Marital Status:    Intimate Partner Violence:     Fear of Current or Ex-Partner:     Emotionally Abused:     Physically Abused:     Sexually Abused:         Review of Systems   All other systems reviewed and are negative  Vitals:  Vitals:    08/16/21 1507   BP: 128/62   BP Location: Right arm   Patient Position: Sitting   Cuff Size: Standard   Pulse: 58   Weight: 63 7 kg (140 lb 8 oz)   Height: 5' 2" (1 575 m)     BMI - Body mass index is 25 7 kg/m²    Wt Readings from Last 7 Encounters:   08/16/21 63 7 kg (140 lb 8 oz)   06/29/21 62 6 kg (138 lb)   05/12/21 64 4 kg (142 lb)   05/07/21 64 9 kg (143 lb)   04/06/21 65 2 kg (143 lb 11 2 oz)   03/19/21 63 5 kg (140 lb)   12/09/20 62 6 kg (138 lb)       Physical Exam  Vitals and nursing note reviewed  Constitutional:       General: She is not in acute distress  Appearance: Normal appearance  She is well-developed  She is not ill-appearing or diaphoretic  HENT:      Head: Normocephalic and atraumatic  Nose: No congestion  Eyes:      General: No scleral icterus  Conjunctiva/sclera: Conjunctivae normal    Neck:      Vascular: No carotid bruit or JVD  Cardiovascular:      Rate and Rhythm: Regular rhythm  Bradycardia present  Pulses: Normal pulses  Heart sounds: Normal heart sounds  No murmur heard  No friction rub  No gallop  Pulmonary:      Effort: Pulmonary effort is normal  No respiratory distress  Breath sounds: Normal breath sounds  No rales  Abdominal:      General: There is no distension  Palpations: Abdomen is soft  Tenderness: There is no abdominal tenderness  Musculoskeletal:         General: No swelling or tenderness  Cervical back: Neck supple  Right lower leg: No edema  Left lower leg: No edema  Skin:     General: Skin is warm  Neurological:      General: No focal deficit present  Mental Status: She is alert and oriented to person, place, and time  Mental status is at baseline  Psychiatric:         Mood and Affect: Mood normal          Behavior: Behavior normal          Thought Content:  Thought content normal            Labs:  CBC:   Lab Results   Component Value Date    WBC 4 73 08/06/2021    RBC 4 12 08/06/2021    HGB 13 3 08/06/2021    HCT 40 2 08/06/2021    MCV 98 08/06/2021     08/06/2021    RDW 12 6 08/06/2021       CMP:   Lab Results   Component Value Date     10/01/2015    K 4 4 08/06/2021     (H) 08/06/2021    CO2 22 08/06/2021    ANIONGAP 6 10/01/2015    BUN 14 08/06/2021    CREATININE 0 82 08/06/2021    EGFR 67 08/06/2021    GLUCOSE 105 10/01/2015    CALCIUM 9 0 08/06/2021 AST 16 2021    ALT 20 2021    ALKPHOS 65 2021    PROT 7 7 10/01/2015    BILITOT 0 41 10/01/2015       Magnesium:  No results found for: MG    Lipid Profile:   Lab Results   Component Value Date    CHOL 274 10/01/2015    HDL 57 2021    TRIG 287 (H) 2021    LDLCALC 180 (H) 2021       Thyroid Studies:   Lab Results   Component Value Date    [id number] 1 870 2021    FREET4 1 13 2017       No components found for: HGA1C    No results found for: YWO9    Imaging: No results found  Cardiac testing:   Results for orders placed during the hospital encounter of 18   Echo complete with contrast if indicated    Narrative [facility]  [facility], [address] [facility]  [phone]    Transthoracic Echocardiogram  2D, M-mode, Doppler, and Color Doppler    Study date:  2018    Patient: [place]  MR number: [id number]  Account number: [de-identified]  : 1940  Age: 65 years  Gender: Female  Status: Outpatient  Location: Bedside  Height: 62 in  Weight: 144 lb  BP: 154/ 72 mmHg    Indications: Assess left ventricular function  Diagnoses: R00 1 - Bradycardia, unspecified    Sonographer:  MALACHI Mcgregor  Primary Physician:  Yamilet Flores MD  Referring Physician:  Jones Abrams MD  Group:  [facility]  cc: Mita Gonsalez MD  Interpreting Physician:  Brianna Williamson MD    SUMMARY    LEFT VENTRICLE:  Systolic function was normal  Ejection fraction was estimated to be 60 %  There were no regional wall motion abnormalities  Doppler parameters were consistent with abnormal left ventricular relaxation (grade 1 diastolic dysfunction)  AORTIC VALVE:  There was mild regurgitation  HISTORY: PRIOR HISTORY: Bradycardia, Dizziness,    PROCEDURE: The procedure was performed at the bedside  This was a routine study  The transthoracic approach was used   The study included complete 2D imaging, M-mode, complete spectral Doppler, and color Doppler  The heart rate was 62 bpm,  at the start of the study  Images were obtained from the parasternal, apical, subcostal, and suprasternal notch acoustic windows  Image quality was adequate  LEFT VENTRICLE: Size was normal  Systolic function was normal  Ejection fraction was estimated to be 60 %  There were no regional wall motion abnormalities  Wall thickness was normal  DOPPLER: There was an increased relative contribution  of atrial contraction to ventricular filling  Doppler parameters were consistent with abnormal left ventricular relaxation (grade 1 diastolic dysfunction)  RIGHT VENTRICLE: The size was normal  Systolic function was normal  Wall thickness was normal     LEFT ATRIUM: Size was normal     RIGHT ATRIUM: Size was normal     MITRAL VALVE: Valve structure was normal  There was normal leaflet separation  DOPPLER: The transmitral velocity was within the normal range  There was no evidence for stenosis  There was trace regurgitation  AORTIC VALVE: The valve was trileaflet  Leaflets exhibited normal thickness and normal cuspal separation  DOPPLER: Transaortic velocity was within the normal range  There was no evidence for stenosis  There was mild regurgitation  TRICUSPID VALVE: The valve structure was normal  There was normal leaflet separation  DOPPLER: The transtricuspid velocity was within the normal range  There was no evidence for stenosis  There was trace regurgitation  Pulmonary artery  systolic pressure was within the normal range  Estimated peak PA pressure was 32 mmHg  PULMONIC VALVE: Leaflets exhibited normal thickness, no calcification, and normal cuspal separation  DOPPLER: The transpulmonic velocity was within the normal range  There was trace regurgitation  PERICARDIUM: There was no pericardial effusion  The pericardium was normal in appearance  AORTA: The root exhibited normal size      SYSTEMIC VEINS: IVC: The inferior vena cava was normal in size  Respirophasic changes were normal     SYSTEM MEASUREMENT TABLES    2D  %FS: 26 7 %  Ao Diam: 3 17 cm  EDV(Teich): 129 72 ml  EF(Teich): 51 81 %  ESV(Teich): 62 51 ml  IVSd: 0 89 cm  LA Area: 19 32 cm2  LA Diam: 4 44 cm  LVEDV MOD A4C: 74 68 ml  LVEF MOD A4C: 69 71 %  LVESV MOD A4C: 22 62 ml  LVIDd: 5 2 cm  LVIDs: 3 81 cm  LVLd A4C: 6 31 cm  LVLs A4C: 5 19 cm  LVPWd: 0 93 cm  RA Area: 19 05 cm2  RVIDd: 3 12 cm  SV MOD A4C: 52 06 ml  SV(Teich): 67 21 ml    CW  TR Vmax: 2 71 m/s  TR maxP 39 mmHg    PW  E': 0 06 m/s  E/E': 8 24  LVOT Env  Ti: 312 08 ms  LVOT VTI: 30 06 cm  LVOT Vmax: 1 58 m/s  LVOT Vmean: 0 96 m/s  LVOT maxPG: 10 08 mmHg  LVOT meanP 61 mmHg  MV A Dre: 0 8 m/s  MV Dec [place]: 2 37 m/s2  MV DecT: 210 94 ms  MV E Dre: 0 5 m/s  MV E/A Ratio: 0 62  MV PHT: 61 17 ms  MVA By PHT: 3 6 cm2    [employer] Accredited Echocardiography Laboratory    Prepared and electronically signed by    Aure Gonzalez MD  Signed 2018 13:43:14       No results found for this or any previous visit  No results found for this or any previous visit  No results found for this or any previous visit

## 2021-08-30 DIAGNOSIS — I10 ESSENTIAL HYPERTENSION: ICD-10-CM

## 2021-08-30 RX ORDER — AMLODIPINE BESYLATE 5 MG/1
TABLET ORAL
Qty: 90 TABLET | Refills: 1 | Status: SHIPPED | OUTPATIENT
Start: 2021-08-30 | End: 2022-02-22 | Stop reason: SDUPTHER

## 2021-10-18 ENCOUNTER — HOSPITAL ENCOUNTER (OUTPATIENT)
Dept: RADIOLOGY | Facility: HOSPITAL | Age: 81
Discharge: HOME | End: 2021-10-18
Attending: INTERNAL MEDICINE
Payer: MEDICARE

## 2021-10-18 DIAGNOSIS — K86.89 PANCREATIC MASS: ICD-10-CM

## 2021-10-18 PROCEDURE — 74183 MRI ABD W/O CNTR FLWD CNTR: CPT

## 2021-10-18 PROCEDURE — G1004 CDSM NDSC: HCPCS

## 2021-10-18 PROCEDURE — A9585 GADOBUTROL INJECTION: HCPCS | Performed by: INTERNAL MEDICINE

## 2021-10-18 RX ADMIN — GADOBUTROL 6 ML: 604.72 INJECTION INTRAVENOUS at 16:43

## 2021-10-22 ENCOUNTER — HOSPITAL ENCOUNTER (OUTPATIENT)
Dept: RADIOLOGY | Facility: HOSPITAL | Age: 81
Discharge: HOME | End: 2021-10-22
Attending: INTERNAL MEDICINE
Payer: MEDICARE

## 2021-10-22 ENCOUNTER — OFFICE VISIT (OUTPATIENT)
Dept: INTERNAL MEDICINE CLINIC | Facility: CLINIC | Age: 81
End: 2021-10-22
Payer: MEDICARE

## 2021-10-22 VITALS
TEMPERATURE: 97.4 F | OXYGEN SATURATION: 97 % | SYSTOLIC BLOOD PRESSURE: 154 MMHG | BODY MASS INDEX: 26.24 KG/M2 | HEIGHT: 62 IN | DIASTOLIC BLOOD PRESSURE: 72 MMHG | WEIGHT: 142.6 LBS | HEART RATE: 54 BPM

## 2021-10-22 DIAGNOSIS — M25.512 ACUTE PAIN OF LEFT SHOULDER: ICD-10-CM

## 2021-10-22 DIAGNOSIS — E55.9 VITAMIN D DEFICIENCY: ICD-10-CM

## 2021-10-22 DIAGNOSIS — E78.2 MIXED HYPERLIPIDEMIA: ICD-10-CM

## 2021-10-22 DIAGNOSIS — K21.9 GASTROESOPHAGEAL REFLUX DISEASE WITHOUT ESOPHAGITIS: ICD-10-CM

## 2021-10-22 DIAGNOSIS — I10 ESSENTIAL HYPERTENSION: Primary | ICD-10-CM

## 2021-10-22 DIAGNOSIS — F34.1 DYSTHYMIA: ICD-10-CM

## 2021-10-22 DIAGNOSIS — K86.89 PANCREATIC MASS: ICD-10-CM

## 2021-10-22 PROCEDURE — 99214 OFFICE O/P EST MOD 30 MIN: CPT | Performed by: INTERNAL MEDICINE

## 2021-10-22 PROCEDURE — 73030 X-RAY EXAM OF SHOULDER: CPT

## 2021-11-06 ENCOUNTER — APPOINTMENT (OUTPATIENT)
Dept: LAB | Facility: HOSPITAL | Age: 81
End: 2021-11-06
Attending: INTERNAL MEDICINE
Payer: MEDICARE

## 2021-11-06 DIAGNOSIS — E78.2 MIXED HYPERLIPIDEMIA: ICD-10-CM

## 2021-11-06 DIAGNOSIS — I10 ESSENTIAL HYPERTENSION: ICD-10-CM

## 2021-11-06 LAB
ALBUMIN SERPL BCP-MCNC: 3.7 G/DL (ref 3.5–5)
ALP SERPL-CCNC: 66 U/L (ref 46–116)
ALT SERPL W P-5'-P-CCNC: 23 U/L (ref 12–78)
ANION GAP SERPL CALCULATED.3IONS-SCNC: 6 MMOL/L (ref 4–13)
AST SERPL W P-5'-P-CCNC: 16 U/L (ref 5–45)
BASOPHILS # BLD AUTO: 0.05 THOUSANDS/ΜL (ref 0–0.1)
BASOPHILS NFR BLD AUTO: 1 % (ref 0–1)
BILIRUB SERPL-MCNC: 0.55 MG/DL (ref 0.2–1)
BUN SERPL-MCNC: 14 MG/DL (ref 5–25)
CALCIUM SERPL-MCNC: 9.2 MG/DL (ref 8.3–10.1)
CHLORIDE SERPL-SCNC: 107 MMOL/L (ref 100–108)
CHOLEST SERPL-MCNC: 184 MG/DL (ref 50–200)
CO2 SERPL-SCNC: 25 MMOL/L (ref 21–32)
CREAT SERPL-MCNC: 0.9 MG/DL (ref 0.6–1.3)
EOSINOPHIL # BLD AUTO: 0.09 THOUSAND/ΜL (ref 0–0.61)
EOSINOPHIL NFR BLD AUTO: 1 % (ref 0–6)
ERYTHROCYTE [DISTWIDTH] IN BLOOD BY AUTOMATED COUNT: 12.8 % (ref 11.6–15.1)
ERYTHROCYTE [SEDIMENTATION RATE] IN BLOOD: 17 MM/HOUR (ref 0–29)
GFR SERPL CREATININE-BSD FRML MDRD: 60 ML/MIN/1.73SQ M
GLUCOSE P FAST SERPL-MCNC: 107 MG/DL (ref 65–99)
HCT VFR BLD AUTO: 42.5 % (ref 34.8–46.1)
HDLC SERPL-MCNC: 68 MG/DL
HGB BLD-MCNC: 13.6 G/DL (ref 11.5–15.4)
IMM GRANULOCYTES # BLD AUTO: 0.01 THOUSAND/UL (ref 0–0.2)
IMM GRANULOCYTES NFR BLD AUTO: 0 % (ref 0–2)
LDLC SERPL CALC-MCNC: 79 MG/DL (ref 0–100)
LYMPHOCYTES # BLD AUTO: 2.79 THOUSANDS/ΜL (ref 0.6–4.47)
LYMPHOCYTES NFR BLD AUTO: 43 % (ref 14–44)
MCH RBC QN AUTO: 32.2 PG (ref 26.8–34.3)
MCHC RBC AUTO-ENTMCNC: 32 G/DL (ref 31.4–37.4)
MCV RBC AUTO: 101 FL (ref 82–98)
MONOCYTES # BLD AUTO: 0.34 THOUSAND/ΜL (ref 0.17–1.22)
MONOCYTES NFR BLD AUTO: 5 % (ref 4–12)
NEUTROPHILS # BLD AUTO: 3.26 THOUSANDS/ΜL (ref 1.85–7.62)
NEUTS SEG NFR BLD AUTO: 50 % (ref 43–75)
NRBC BLD AUTO-RTO: 0 /100 WBCS
PLATELET # BLD AUTO: 221 THOUSANDS/UL (ref 149–390)
PMV BLD AUTO: 11.1 FL (ref 8.9–12.7)
POTASSIUM SERPL-SCNC: 4.2 MMOL/L (ref 3.5–5.3)
PROT SERPL-MCNC: 8.1 G/DL (ref 6.4–8.2)
RBC # BLD AUTO: 4.23 MILLION/UL (ref 3.81–5.12)
SODIUM SERPL-SCNC: 138 MMOL/L (ref 136–145)
TRIGL SERPL-MCNC: 184 MG/DL
WBC # BLD AUTO: 6.54 THOUSAND/UL (ref 4.31–10.16)

## 2021-11-06 PROCEDURE — 80053 COMPREHEN METABOLIC PANEL: CPT

## 2021-11-06 PROCEDURE — 85652 RBC SED RATE AUTOMATED: CPT

## 2021-11-06 PROCEDURE — 85025 COMPLETE CBC W/AUTO DIFF WBC: CPT

## 2021-11-06 PROCEDURE — 36415 COLL VENOUS BLD VENIPUNCTURE: CPT

## 2021-11-06 PROCEDURE — 80061 LIPID PANEL: CPT

## 2021-11-12 ENCOUNTER — HOSPITAL ENCOUNTER (OUTPATIENT)
Dept: BONE DENSITY | Facility: MEDICAL CENTER | Age: 81
Discharge: HOME | End: 2021-11-12
Payer: MEDICARE

## 2021-11-12 ENCOUNTER — HOSPITAL ENCOUNTER (OUTPATIENT)
Dept: MAMMOGRAPHY | Facility: MEDICAL CENTER | Age: 81
Discharge: HOME | End: 2021-11-12
Payer: MEDICARE

## 2021-11-12 VITALS — BODY MASS INDEX: 26.25 KG/M2 | WEIGHT: 142.64 LBS | HEIGHT: 62 IN

## 2021-11-12 DIAGNOSIS — Z13.820 ENCOUNTER FOR OSTEOPOROSIS SCREENING IN ASYMPTOMATIC POSTMENOPAUSAL PATIENT: ICD-10-CM

## 2021-11-12 DIAGNOSIS — Z12.31 ENCOUNTER FOR SCREENING MAMMOGRAM FOR BREAST CANCER: ICD-10-CM

## 2021-11-12 DIAGNOSIS — Z78.0 ENCOUNTER FOR OSTEOPOROSIS SCREENING IN ASYMPTOMATIC POSTMENOPAUSAL PATIENT: ICD-10-CM

## 2021-11-12 PROCEDURE — 77067 SCR MAMMO BI INCL CAD: CPT

## 2021-11-12 PROCEDURE — 77063 BREAST TOMOSYNTHESIS BI: CPT

## 2021-11-12 PROCEDURE — 77080 DXA BONE DENSITY AXIAL: CPT

## 2021-11-16 ENCOUNTER — TELEPHONE (OUTPATIENT)
Dept: INTERNAL MEDICINE CLINIC | Facility: CLINIC | Age: 81
End: 2021-11-16

## 2021-11-16 ENCOUNTER — OFFICE VISIT (OUTPATIENT)
Dept: INTERNAL MEDICINE CLINIC | Facility: CLINIC | Age: 81
End: 2021-11-16
Payer: MEDICARE

## 2021-11-16 DIAGNOSIS — J01.00 ACUTE NON-RECURRENT MAXILLARY SINUSITIS: ICD-10-CM

## 2021-11-16 DIAGNOSIS — I10 ESSENTIAL HYPERTENSION: Primary | ICD-10-CM

## 2021-11-16 DIAGNOSIS — E55.9 VITAMIN D DEFICIENCY: ICD-10-CM

## 2021-11-16 DIAGNOSIS — E78.2 MIXED HYPERLIPIDEMIA: ICD-10-CM

## 2021-11-16 DIAGNOSIS — K86.2 PANCREATIC CYST: ICD-10-CM

## 2021-11-16 PROCEDURE — 99213 OFFICE O/P EST LOW 20 MIN: CPT | Performed by: INTERNAL MEDICINE

## 2021-11-16 RX ORDER — AMOXICILLIN 500 MG/1
500 CAPSULE ORAL EVERY 8 HOURS SCHEDULED
Qty: 21 CAPSULE | Refills: 0 | Status: SHIPPED | OUTPATIENT
Start: 2021-11-16 | End: 2021-11-23

## 2021-11-21 VITALS
HEART RATE: 65 BPM | DIASTOLIC BLOOD PRESSURE: 72 MMHG | SYSTOLIC BLOOD PRESSURE: 135 MMHG | WEIGHT: 142.2 LBS | BODY MASS INDEX: 26.17 KG/M2 | TEMPERATURE: 98.8 F | OXYGEN SATURATION: 97 % | HEIGHT: 62 IN

## 2021-12-13 DIAGNOSIS — F41.9 ANXIETY: ICD-10-CM

## 2021-12-13 DIAGNOSIS — I10 ESSENTIAL HYPERTENSION: ICD-10-CM

## 2021-12-13 RX ORDER — OLMESARTAN MEDOXOMIL 20 MG/1
TABLET ORAL
Qty: 180 TABLET | Refills: 0 | Status: SHIPPED | OUTPATIENT
Start: 2021-12-13 | End: 2022-06-02 | Stop reason: SDUPTHER

## 2021-12-13 RX ORDER — CITALOPRAM 20 MG/1
TABLET ORAL
Qty: 90 TABLET | Refills: 3 | Status: SHIPPED | OUTPATIENT
Start: 2021-12-13

## 2022-01-04 PROCEDURE — 99282 EMERGENCY DEPT VISIT SF MDM: CPT

## 2022-01-05 ENCOUNTER — HOSPITAL ENCOUNTER (EMERGENCY)
Facility: HOSPITAL | Age: 82
Discharge: HOME | End: 2022-01-05
Attending: EMERGENCY MEDICINE
Payer: MEDICARE

## 2022-01-05 VITALS
TEMPERATURE: 98 F | DIASTOLIC BLOOD PRESSURE: 70 MMHG | RESPIRATION RATE: 16 BRPM | OXYGEN SATURATION: 100 % | SYSTOLIC BLOOD PRESSURE: 166 MMHG | HEART RATE: 54 BPM

## 2022-01-05 DIAGNOSIS — I10 HIGH BLOOD PRESSURE: Primary | ICD-10-CM

## 2022-01-05 PROCEDURE — 99282 EMERGENCY DEPT VISIT SF MDM: CPT | Performed by: EMERGENCY MEDICINE

## 2022-01-05 NOTE — ED ATTENDING ATTESTATION
1/4/2022  IRadha MD, saw and evaluated the patient  I have discussed the patient with the resident/non-physician practitioner and agree with the resident's/non-physician practitioner's findings, Plan of Care, and MDM as documented in the resident's/non-physician practitioner's note, except where noted  All available labs and Radiology studies were reviewed  I was present for key portions of any procedure(s) performed by the resident/non-physician practitioner and I was immediately available to provide assistance  At this point I agree with the current assessment done in the Emergency Department  I have conducted an independent evaluation of this patient a history and physical is as follows:    ED Course     Emergency Department Note- Cat Mike 79 y o  female MRN: [id number]    Unit/Bed#: Z5HD Encounter: [id number]    Cat Mike is a 79 y o  female who presents with   Chief Complaint   Patient presents with    High Blood Pressure     doctor told me to take extra pills if it was higher than 160, i did and its still 170 180 but i feel fine         History of Present Illness   HPI:  Cat Mike is a 79 y o  female who presents for evaluation of:  Elevation of blood pressure throughout the day  Patient took her antihypertensives at home as prescribed  Her blood pressure remained elevated prompting a visit to the ED  She denies any specific complaints related to her blood pressure such as chest pain or headache  Review of Systems   Constitutional: Negative for chills and fever  HENT: Negative for congestion and rhinorrhea  Respiratory: Negative for cough and shortness of breath  Cardiovascular: Negative for chest pain and palpitations  Gastrointestinal: Negative for nausea and vomiting  Neurological: Negative for light-headedness and headaches  All other systems reviewed and are negative        Historical Information   Past Medical History:   Diagnosis Date    Aortic insufficiency     Cee's esophagus     Gastroesophageal reflux disease     Glaucoma     Hypercholesterolemia     Hyperlipidemia     Hypertension     Kidney stones 2009    Low back pain     Major depressive disorder     Mitral valve insufficiency     Osteoporosis     Osteoporosis     Vitamin D deficiency      Past Surgical History:   Procedure Laterality Date    COLONOSCOPY  06/02/2011    Normal     CYSTOSCOPY  2009    Polyp, stones    MAMMO (HISTORICAL)  09/19/2016    Negative     OTHER SURGICAL HISTORY      Infection in arm      Social History   Social History     Substance and Sexual Activity   Alcohol Use No     Social History     Substance and Sexual Activity   Drug Use No     Social History     Tobacco Use   Smoking Status Never Smoker   Smokeless Tobacco Never Used     Family History:   Family History   Problem Relation Age of Onset    Cancer Mother 80        unknown type of abdominal cancer    No Known Problems Father     No Known Problems Sister     No Known Problems Daughter     No Known Problems Maternal Grandmother     No Known Problems Maternal Grandfather     No Known Problems Paternal Grandmother     No Known Problems Paternal Grandfather     No Known Problems Daughter     No Known Problems Son     Lung cancer Brother 79    Lung cancer Brother 72       Meds/Allergies   PTA meds:   Prior to Admission Medications   Prescriptions Last Dose Informant Patient Reported? Taking?    Ascorbic Acid (vitamin C) 1000 MG tablet  Self Yes No   Sig: Take 1,000 mg by mouth daily   Lumigan 0 01 % ophthalmic drops  Self Yes No   Travatan Z 0 004 % ophthalmic solution  Self Yes No   amLODIPine (NORVASC) 5 mg tablet  Self No No   Sig: TAKE 1 TAB DAILY   aspirin (ECOTRIN LOW STRENGTH) 81 mg EC tablet  Self Yes No   Sig: Take 81 mg by mouth daily    cholecalciferol (VITAMIN D3) 25 mcg (1,000 units) tablet  Self Yes No   Sig: Take 1,000 Units by mouth daily   citalopram (CeleXA) 20 mg tablet   No No   Sig: TAKE 1/2 OR 1 TAB DAILY AS DIRECTED BY DOCTOR   dorzolamide (TRUSOPT) 2 % ophthalmic solution  Self Yes No   olmesartan (BENICAR) 20 mg tablet   No No   Sig: TAKE 1 TAB ONCE A DAY EXCEPT FOR HIGH BP TAKE 2 TABS IF ABOVE 183 FOR SYSTOLIC   omeprazole (PriLOSEC) 20 mg delayed release capsule  Self Yes No   rosuvastatin (CRESTOR) 5 mg tablet  Self No No   Sig: Take 1 tablet (5 mg total) by mouth daily at bedtime      Facility-Administered Medications: None     No Known Allergies    Objective   First Vitals:   Blood Pressure: (!) 175/73 (01/04/22 1833)  Pulse: (!) 54 (01/04/22 1833)  Temperature: 98 °F (36 7 °C) (01/04/22 1833)  Temp Source: Tympanic (01/04/22 1833)  Respirations: 16 (01/04/22 1833)  SpO2: 100 % (01/04/22 1833)    Current Vitals:   Blood Pressure: 166/70 (01/05/22 0008)  Pulse: (!) 54 (01/04/22 1833)  Temperature: 98 °F (36 7 °C) (01/04/22 1833)  Temp Source: Tympanic (01/04/22 1833)  Respirations: 16 (01/04/22 1833)  SpO2: 100 % (01/04/22 1833)    No intake or output data in the 24 hours ending 01/05/22 0152    Invasive Devices  Report    None                 Physical Exam  Vitals and nursing note reviewed  Constitutional:       General: She is not in acute distress  Appearance: Normal appearance  She is well-developed  HENT:      Head: Normocephalic and atraumatic  Right Ear: External ear normal       Left Ear: External ear normal       Nose: Nose normal       Mouth/Throat:      Pharynx: No oropharyngeal exudate  Eyes:      Conjunctiva/sclera: Conjunctivae normal       Pupils: Pupils are equal, round, and reactive to light  Cardiovascular:      Rate and Rhythm: Normal rate and regular rhythm  Pulmonary:      Effort: Pulmonary effort is normal  No respiratory distress  Abdominal:      General: Abdomen is flat  There is no distension  Musculoskeletal:         General: No deformity  Normal range of motion        Cervical back: Normal range of motion and neck supple  Skin:     General: Skin is warm and dry  Capillary Refill: Capillary refill takes less than 2 seconds  Neurological:      General: No focal deficit present  Mental Status: She is alert and oriented to person, place, and time  Mental status is at baseline  Coordination: Coordination normal    Psychiatric:         Mood and Affect: Mood normal          Behavior: Behavior normal          Thought Content: Thought content normal          Judgment: Judgment normal            Medical Decision Makin  Hypertension:  Patient does not have any symptoms related to her hypertension  She is compliant with her antihypertensive regimen  I have encouraged her to follow-up with her primary care physician for further management of her hypertension as an outpatient  No results found for this or any previous visit (from the past 36 hour(s))  No orders to display         Portions of the record may have been created with voice recognition software  Occasional wrong word or "sound a like" substitutions may have occurred due to the inherent limitations of voice recognition software  Read the chart carefully and recognize, using context, where substitutions have occurred          Critical Care Time  Procedures

## 2022-01-05 NOTE — ED PROVIDER NOTES
History  Chief Complaint   Patient presents with    High Blood Pressure     doctor told me to take extra pills if it was higher than 160, i did and its still 170 180 but i feel fine     80 yo F w/ hx of essential HTN presents to ED for asymptomatic high blood pressure  Pt checks her BP daily w/ at-home BP device  Her systolic BP this morning was 160  She took additional doses of her prescribed ARB and continued to re-check her BP throughout the day which continued to read 068-900 systolic  Pt states she feels fine  Denies any symptoms  Takes norvasc 5 mg QD and olmesartan 20 mg QD  Today she took 60 additional mg of olmesartan  Denies changes in vision, lightheadedness, chest pain, weakness, urinary symptoms, or abdominal pain  Prior to Admission Medications   Prescriptions Last Dose Informant Patient Reported? Taking?    Ascorbic Acid (vitamin C) 1000 MG tablet  Self Yes No   Sig: Take 1,000 mg by mouth daily   Lumigan 0 01 % ophthalmic drops  Self Yes No   Travatan Z 0 004 % ophthalmic solution  Self Yes No   amLODIPine (NORVASC) 5 mg tablet  Self No No   Sig: TAKE 1 TAB DAILY   aspirin (ECOTRIN LOW STRENGTH) 81 mg EC tablet  Self Yes No   Sig: Take 81 mg by mouth daily    cholecalciferol (VITAMIN D3) 25 mcg (1,000 units) tablet  Self Yes No   Sig: Take 1,000 Units by mouth daily   citalopram (CeleXA) 20 mg tablet   No No   Sig: TAKE 1/2 OR 1 TAB DAILY AS DIRECTED BY DOCTOR   dorzolamide (TRUSOPT) 2 % ophthalmic solution  Self Yes No   olmesartan (BENICAR) 20 mg tablet   No No   Sig: TAKE 1 TAB ONCE A DAY EXCEPT FOR HIGH BP TAKE 2 TABS IF ABOVE 352 FOR SYSTOLIC   omeprazole (PriLOSEC) 20 mg delayed release capsule  Self Yes No   rosuvastatin (CRESTOR) 5 mg tablet  Self No No   Sig: Take 1 tablet (5 mg total) by mouth daily at bedtime      Facility-Administered Medications: None       Past Medical History:   Diagnosis Date    Aortic insufficiency     Cee's esophagus     Gastroesophageal reflux disease     Glaucoma     Hypercholesterolemia     Hyperlipidemia     Hypertension     Kidney stones 2009    Low back pain     Major depressive disorder     Mitral valve insufficiency     Osteoporosis     Osteoporosis     Vitamin D deficiency        Past Surgical History:   Procedure Laterality Date    COLONOSCOPY  06/02/2011    Normal     CYSTOSCOPY  2009    Polyp, stones    MAMMO (HISTORICAL)  09/19/2016    Negative     OTHER SURGICAL HISTORY      Infection in arm        Family History   Problem Relation Age of Onset    Cancer Mother 80        unknown type of abdominal cancer    No Known Problems Father     No Known Problems Sister     No Known Problems Daughter     No Known Problems Maternal Grandmother     No Known Problems Maternal Grandfather     No Known Problems Paternal Grandmother     No Known Problems Paternal Grandfather     No Known Problems Daughter     No Known Problems Son     Lung cancer Brother 79    Lung cancer Brother 72     I have reviewed and agree with the history as documented  E-Cigarette/Vaping    E-Cigarette Use Never User      E-Cigarette/Vaping Substances    Nicotine No     THC No     CBD No     Flavoring No     Other No      Social History     Tobacco Use    Smoking status: Never Smoker    Smokeless tobacco: Never Used   Vaping Use    Vaping Use: Never used   Substance Use Topics    Alcohol use: No    Drug use: No        Review of Systems   All other systems reviewed and are negative        Physical Exam  ED Triage Vitals [01/04/22 1833]   Temperature Pulse Respirations Blood Pressure SpO2   98 °F (36 7 °C) (!) 54 16 (!) 175/73 100 %      Temp Source Heart Rate Source Patient Position - Orthostatic VS BP Location FiO2 (%)   Tympanic Monitor Sitting Left arm --      Pain Score       --             Orthostatic Vital Signs  Vitals:    01/04/22 1833 01/05/22 0008   BP: (!) 175/73 166/70   Pulse: (!) 54    Patient Position - Orthostatic VS: Sitting Physical Exam  Vitals and nursing note reviewed  Constitutional:       General: She is not in acute distress  Appearance: Normal appearance  She is normal weight  She is not ill-appearing, toxic-appearing or diaphoretic  HENT:      Head: Normocephalic and atraumatic  Right Ear: External ear normal       Left Ear: External ear normal       Nose: Nose normal       Mouth/Throat:      Mouth: Mucous membranes are moist       Pharynx: Oropharynx is clear  Eyes:      General:         Right eye: No discharge  Left eye: No discharge  Extraocular Movements: Extraocular movements intact  Conjunctiva/sclera: Conjunctivae normal       Pupils: Pupils are equal, round, and reactive to light  Cardiovascular:      Rate and Rhythm: Normal rate and regular rhythm  Pulses: Normal pulses  Heart sounds: Normal heart sounds  No murmur heard  No friction rub  Pulmonary:      Effort: Pulmonary effort is normal  No respiratory distress  Breath sounds: Normal breath sounds  No wheezing or rales  Abdominal:      General: Abdomen is flat  Bowel sounds are normal  There is no distension  Palpations: Abdomen is soft  Tenderness: There is no abdominal tenderness  There is no right CVA tenderness, left CVA tenderness or guarding  Musculoskeletal:         General: No swelling  Normal range of motion  Cervical back: Normal range of motion and neck supple  No rigidity  Skin:     General: Skin is warm and dry  Capillary Refill: Capillary refill takes less than 2 seconds  Coloration: Skin is not pale  Neurological:      General: No focal deficit present  Mental Status: She is alert and oriented to person, place, and time  Cranial Nerves: No cranial nerve deficit  Sensory: No sensory deficit  Motor: No weakness        Gait: Gait normal    Psychiatric:         Mood and Affect: Mood normal          Behavior: Behavior normal          ED Medications  Medications - No data to display    Diagnostic Studies  Results Reviewed     None                 No orders to display         Procedures  Procedures      ED Course                                       MDM  Number of Diagnoses or Management Options  High blood pressure: established and worsening  Diagnosis management comments: Impression: well-appearing 78 yo F presents w/ asymptomatic HTN  No findings on physical exam   Plan: discharge to home  Follow up with PCP to adjust medications  Amount and/or Complexity of Data Reviewed  Review and summarize past medical records: yes    Risk of Complications, Morbidity, and/or Mortality  Presenting problems: low  Diagnostic procedures: minimal  Management options: minimal    Patient Progress  Patient progress: stable      Disposition  Final diagnoses:   High blood pressure     Time reflects when diagnosis was documented in both MDM as applicable and the Disposition within this note     Time User Action Codes Description Comment    1/5/2022  1:24 AM Ace Moat Add [I10] High blood pressure       ED Disposition     ED Disposition Condition Date/Time Comment    Discharge Stable Wed Jan 5, 2022  1:24 AM [facility] discharge to home/self care              Follow-up Information     Follow up With Specialties Details Why Contact Info    Larissa Jackman MD Internal Medicine Call in 1 day As needed [address] [place] [address]  [phone]            Discharge Medication List as of 1/5/2022  1:25 AM      CONTINUE these medications which have NOT CHANGED    Details   amLODIPine (NORVASC) 5 mg tablet TAKE 1 TAB DAILY, Normal      Ascorbic Acid (vitamin C) 1000 MG tablet Take 1,000 mg by mouth daily, Historical Med      aspirin (ECOTRIN LOW STRENGTH) 81 mg EC tablet Take 81 mg by mouth daily , Historical Med      cholecalciferol (VITAMIN D3) 25 mcg (1,000 units) tablet Take 1,000 Units by mouth daily, Historical Med      citalopram (CeleXA) 20 mg tablet TAKE 1/2 OR 1 TAB DAILY AS DIRECTED BY DOCTOR, Normal      dorzolamide (TRUSOPT) 2 % ophthalmic solution Starting Tue 3/2/2021, Historical Med      Lumigan 0 01 % ophthalmic drops Starting Fri 4/23/2021, Historical Med      olmesartan (BENICAR) 20 mg tablet TAKE 1 TAB ONCE A DAY EXCEPT FOR HIGH BP TAKE 2 TABS IF ABOVE 347 FOR SYSTOLIC, Normal      omeprazole (PriLOSEC) 20 mg delayed release capsule Starting Wed 3/10/2021, Historical Med      rosuvastatin (CRESTOR) 5 mg tablet Take 1 tablet (5 mg total) by mouth daily at bedtime, Starting Mon 8/16/2021, Normal      Travatan Z 0 004 % ophthalmic solution Starting Tue 1/26/2021, Historical Med           No discharge procedures on file  PDMP Review     None           ED Provider  Attending physically available and evaluated [facility] managed the patient along with the ED Attending      Electronically Signed by         Jona Pleitez MD  01/07/22 7833

## 2022-01-05 NOTE — DISCHARGE INSTRUCTIONS
Return to ER if you experience chest pain, abdominal pain, lightheadedness, or confusion  Follow up with Dr Marguerite Tate

## 2022-02-09 ENCOUNTER — VBI (OUTPATIENT)
Dept: ADMINISTRATIVE | Facility: OTHER | Age: 82
End: 2022-02-09

## 2022-02-22 ENCOUNTER — OFFICE VISIT (OUTPATIENT)
Dept: INTERNAL MEDICINE CLINIC | Facility: CLINIC | Age: 82
End: 2022-02-22
Payer: MEDICARE

## 2022-02-22 VITALS
BODY MASS INDEX: 25.76 KG/M2 | WEIGHT: 140 LBS | SYSTOLIC BLOOD PRESSURE: 138 MMHG | OXYGEN SATURATION: 98 % | HEIGHT: 62 IN | HEART RATE: 60 BPM | TEMPERATURE: 97.1 F | DIASTOLIC BLOOD PRESSURE: 70 MMHG

## 2022-02-22 DIAGNOSIS — I10 ESSENTIAL HYPERTENSION: Primary | ICD-10-CM

## 2022-02-22 DIAGNOSIS — Z12.31 VISIT FOR SCREENING MAMMOGRAM: ICD-10-CM

## 2022-02-22 DIAGNOSIS — J31.1 POST-NASAL CATARRH: ICD-10-CM

## 2022-02-22 DIAGNOSIS — E78.2 MIXED HYPERLIPIDEMIA: ICD-10-CM

## 2022-02-22 PROCEDURE — 99214 OFFICE O/P EST MOD 30 MIN: CPT | Performed by: INTERNAL MEDICINE

## 2022-02-22 RX ORDER — AMLODIPINE BESYLATE 5 MG/1
5 TABLET ORAL DAILY
Qty: 90 TABLET | Refills: 1 | Status: SHIPPED | OUTPATIENT
Start: 2022-02-22

## 2022-02-22 RX ORDER — OLMESARTAN MEDOXOMIL 20 MG/1
TABLET ORAL
Qty: 180 TABLET | Refills: 0 | Status: CANCELLED | OUTPATIENT
Start: 2022-02-22

## 2022-02-22 NOTE — PROGRESS NOTES
Assessment/Plan:           1  Essential hypertension  -     amLODIPine (NORVASC) 5 mg tablet; Take 1 tablet (5 mg total) by mouth daily    2  Post-nasal catarrh  -     sodium chloride (OCEAN) 0 65 % nasal spray; 1 spray into each nostril every 2 (two) hours while awake    3  Mixed hyperlipidemia    4  Visit for screening mammogram  -     Mammo screening bilateral w 3d & cad; Future; Expected date: 02/22/2022           1  Essential hypertension    - amLODIPine (NORVASC) 5 mg tablet; Take 1 tablet (5 mg total) by mouth daily  Dispense: 90 tablet; Refill: 1       No problem-specific Assessment & Plan notes found for this encounter  Subjective:      Patient ID: Nancy Lovell is a 79 y o  female  HPI    The following portions of the patient's history were reviewed and updated as appropriate: She  has a past medical history of Aortic insufficiency, Cee's esophagus, Gastroesophageal reflux disease, Glaucoma, Hypercholesterolemia, Hyperlipidemia, Hypertension, Kidney stones (2009), Low back pain, Major depressive disorder, Mitral valve insufficiency, Osteoporosis, Osteoporosis, and Vitamin D deficiency  She   Patient Active Problem List    Diagnosis Date Noted    Pancreatic cyst 03/19/2021    Vitamin D deficiency 03/19/2021    Dysthymia 03/19/2021    Gastroesophageal reflux disease without esophagitis 03/19/2021    Essential hypertension 01/19/2018    Hyperlipemia 01/19/2018    Anxiety 01/19/2018    Bradycardia 01/19/2018    Dizziness 01/19/2018     She  has a past surgical history that includes Cystoscopy (2009); Other surgical history; Colonoscopy (06/02/2011); and Mammo (historical) (09/19/2016)    Her family history includes Cancer (age of onset: 80) in her mother; Lung cancer (age of onset: 72) in her brother; Lung cancer (age of onset: 79) in her brother; No Known Problems in her daughter, daughter, father, maternal grandfather, maternal grandmother, paternal grandfather, paternal grandmother, sister, and son  She  reports that she has never smoked  She has never used smokeless tobacco  She reports that she does not drink alcohol and does not use drugs  Current Outpatient Medications   Medication Sig Dispense Refill    amLODIPine (NORVASC) 5 mg tablet Take 1 tablet (5 mg total) by mouth daily 90 tablet 1    Ascorbic Acid (vitamin C) 1000 MG tablet Take 1,000 mg by mouth daily      aspirin (ECOTRIN LOW STRENGTH) 81 mg EC tablet Take 81 mg by mouth daily       cholecalciferol (VITAMIN D3) 25 mcg (1,000 units) tablet Take 1,000 Units by mouth daily      citalopram (CeleXA) 20 mg tablet TAKE 1/2 OR 1 TAB DAILY AS DIRECTED BY DOCTOR 90 tablet 3    dorzolamide (TRUSOPT) 2 % ophthalmic solution       Lumigan 0 01 % ophthalmic drops       olmesartan (BENICAR) 20 mg tablet TAKE 1 TAB ONCE A DAY EXCEPT FOR HIGH BP TAKE 2 TABS IF ABOVE 960 FOR SYSTOLIC 004 tablet 0    omeprazole (PriLOSEC) 20 mg delayed release capsule       rosuvastatin (CRESTOR) 5 mg tablet Take 1 tablet (5 mg total) by mouth daily at bedtime 90 tablet 1    Travatan Z 0 004 % ophthalmic solution       sodium chloride (OCEAN) 0 65 % nasal spray 1 spray into each nostril every 2 (two) hours while awake 60 mL 1     No current facility-administered medications for this visit       Current Outpatient Medications on File Prior to Visit   Medication Sig    Ascorbic Acid (vitamin C) 1000 MG tablet Take 1,000 mg by mouth daily    aspirin (ECOTRIN LOW STRENGTH) 81 mg EC tablet Take 81 mg by mouth daily     cholecalciferol (VITAMIN D3) 25 mcg (1,000 units) tablet Take 1,000 Units by mouth daily    citalopram (CeleXA) 20 mg tablet TAKE 1/2 OR 1 TAB DAILY AS DIRECTED BY DOCTOR    dorzolamide (TRUSOPT) 2 % ophthalmic solution     Lumigan 0 01 % ophthalmic drops     olmesartan (BENICAR) 20 mg tablet TAKE 1 TAB ONCE A DAY EXCEPT FOR HIGH BP TAKE 2 TABS IF ABOVE 800 FOR SYSTOLIC    omeprazole (PriLOSEC) 20 mg delayed release capsule     rosuvastatin (CRESTOR) 5 mg tablet Take 1 tablet (5 mg total) by mouth daily at bedtime    Travatan Z 0 004 % ophthalmic solution     [DISCONTINUED] amLODIPine (NORVASC) 5 mg tablet TAKE 1 TAB DAILY     No current facility-administered medications on file prior to visit  She has No Known Allergies       Review of Systems   Constitutional: Negative for appetite change, chills, fatigue and fever  HENT: Negative for sore throat and trouble swallowing  Eyes: Negative for redness  Respiratory: Negative for shortness of breath  Cardiovascular: Negative for chest pain and palpitations  Gastrointestinal: Negative for abdominal pain, constipation and diarrhea  Genitourinary: Negative for dysuria and hematuria  Musculoskeletal: Negative for back pain and neck pain  Skin: Negative for rash  Neurological: Negative for seizures, weakness and headaches  Hematological: Negative for adenopathy  Psychiatric/Behavioral: Negative for confusion  The patient is not nervous/anxious  Objective:      /70 (BP Location: Right arm, Patient Position: Sitting, Cuff Size: Standard)   Pulse 60   Temp (!) 97 1 °F (36 2 °C) (Tympanic)   Ht 5' 2" (1 575 m)   Wt 63 5 kg (140 lb)   SpO2 98%   BMI 25 61 kg/m²     Results Reviewed     None          No results found for this or any previous visit (from the past 1344 hour(s))  Physical Exam  Constitutional:       General: She is not in acute distress  Appearance: Normal appearance  HENT:      Head: Normocephalic and atraumatic  Nose: Nose normal       Mouth/Throat:      Mouth: Mucous membranes are moist       Comments: pn drip  Eyes:      Extraocular Movements: Extraocular movements intact  Pupils: Pupils are equal, round, and reactive to light  Cardiovascular:      Rate and Rhythm: Normal rate and regular rhythm  Pulses: Normal pulses  Heart sounds: Normal heart sounds  No murmur heard  No friction rub  Pulmonary:      Effort: Pulmonary effort is normal  No respiratory distress  Breath sounds: Normal breath sounds  No wheezing  Abdominal:      General: Abdomen is flat  Bowel sounds are normal  There is no distension  Palpations: Abdomen is soft  There is no mass  Tenderness: There is no abdominal tenderness  There is no guarding  Musculoskeletal:         General: Normal range of motion  Cervical back: Normal range of motion  Neurological:      General: No focal deficit present  Mental Status: She is alert and oriented to person, place, and time  Mental status is at baseline  Cranial Nerves: No cranial nerve deficit     Psychiatric:         Mood and Affect: Mood normal          Behavior: Behavior normal

## 2022-03-01 ENCOUNTER — OFFICE VISIT (OUTPATIENT)
Dept: INTERNAL MEDICINE CLINIC | Facility: CLINIC | Age: 82
End: 2022-03-01
Payer: MEDICARE

## 2022-03-01 VITALS
SYSTOLIC BLOOD PRESSURE: 118 MMHG | HEART RATE: 60 BPM | DIASTOLIC BLOOD PRESSURE: 58 MMHG | TEMPERATURE: 97.6 F | BODY MASS INDEX: 25.4 KG/M2 | OXYGEN SATURATION: 98 % | HEIGHT: 62 IN | WEIGHT: 138 LBS

## 2022-03-01 DIAGNOSIS — I10 ESSENTIAL HYPERTENSION: ICD-10-CM

## 2022-03-01 DIAGNOSIS — R41.3 MEMORY LOSS: ICD-10-CM

## 2022-03-01 DIAGNOSIS — E55.9 VITAMIN D DEFICIENCY: ICD-10-CM

## 2022-03-01 DIAGNOSIS — Z71.89 COUNSELING REGARDING ADVANCE CARE PLANNING AND GOALS OF CARE: ICD-10-CM

## 2022-03-01 DIAGNOSIS — M81.0 AGE-RELATED OSTEOPOROSIS WITHOUT CURRENT PATHOLOGICAL FRACTURE: Primary | ICD-10-CM

## 2022-03-01 DIAGNOSIS — J04.10 TRACHEITIS: ICD-10-CM

## 2022-03-01 DIAGNOSIS — Z23 NEED FOR SHINGLES VACCINE: ICD-10-CM

## 2022-03-01 DIAGNOSIS — E78.2 MIXED HYPERLIPIDEMIA: ICD-10-CM

## 2022-03-01 PROCEDURE — 99215 OFFICE O/P EST HI 40 MIN: CPT | Performed by: INTERNAL MEDICINE

## 2022-03-01 RX ORDER — ZOSTER VACCINE RECOMBINANT, ADJUVANTED 50 MCG/0.5
0.5 KIT INTRAMUSCULAR ONCE
Qty: 1 EACH | Refills: 1 | Status: SHIPPED | OUTPATIENT
Start: 2022-03-01 | End: 2022-03-01

## 2022-03-01 RX ORDER — AMOXICILLIN 500 MG/1
500 CAPSULE ORAL EVERY 8 HOURS SCHEDULED
Qty: 15 CAPSULE | Refills: 0 | Status: SHIPPED | OUTPATIENT
Start: 2022-03-01 | End: 2022-03-06

## 2022-03-01 NOTE — PROGRESS NOTES
Assessment/Plan: This is 76-year-old lady with a history of hypertension hyperlipidemia anxiety dizziness bradycardia GERD osteoporosis and depression  She states that she is still having considerable amount of cough and Ocean Spray has only helped a little  A course of amoxicillin was prescribed  Rheumatology consultation for treatment of osteoporosis was advised  A prescription for shingles vaccine was also given and routine blood work was ordered  1  Age-related osteoporosis without current pathological fracture  -     Ambulatory Referral to Rheumatology; Future  -     PTH, intact; Future    2  Essential hypertension  -     CBC and differential; Future  -     Comprehensive metabolic panel; Future  -     Urinalysis with microscopic  -     TSH, 3rd generation; Future    3  Tracheitis  -     amoxicillin (AMOXIL) 500 mg capsule; Take 1 capsule (500 mg total) by mouth every 8 (eight) hours for 5 days    4  Need for shingles vaccine  -     Zoster Vac Recomb Adjuvanted (Shingrix) 50 MCG/0 5ML SUSR; Inject 0 5 mL into a muscle once for 1 dose Repeat dose in 2 to 6 months    5  Mixed hyperlipidemia  -     Lipid panel; Future    6  Memory loss  -     Vitamin B12; Future    7  Vitamin D deficiency  -     Vitamin D 25 hydroxy; Future    8  Counseling regarding advance care planning and goals of care           1  Essential hypertension      2  Tracheitis         No problem-specific Assessment & Plan notes found for this encounter  Subjective:      Patient ID: Alyssa Wilson is a 79 y o  female  This is 76-year-old lady with a history of hypertension hyperlipidemia anxiety dizziness bradycardia GERD osteoporosis and depression  She states that she is still having considerable amount of cough and Ocean Spray has only helped a little  A course of amoxicillin was prescribed  Rheumatology consultation for treatment of osteoporosis was advised    A prescription for shingles vaccine was also given and routine blood work was ordered  The following portions of the patient's history were reviewed and updated as appropriate: She  has a past medical history of Aortic insufficiency, Cee's esophagus, Gastroesophageal reflux disease, Glaucoma, Hypercholesterolemia, Hyperlipidemia, Hypertension, Kidney stones (2009), Low back pain, Major depressive disorder, Mitral valve insufficiency, Osteoporosis, Osteoporosis, and Vitamin D deficiency  She   Patient Active Problem List    Diagnosis Date Noted    Pancreatic cyst 03/19/2021    Vitamin D deficiency 03/19/2021    Dysthymia 03/19/2021    Gastroesophageal reflux disease without esophagitis 03/19/2021    Essential hypertension 01/19/2018    Hyperlipemia 01/19/2018    Anxiety 01/19/2018    Bradycardia 01/19/2018    Dizziness 01/19/2018     She  has a past surgical history that includes Cystoscopy (2009); Other surgical history; Colonoscopy (06/02/2011); and Mammo (historical) (09/19/2016)  Her family history includes Cancer (age of onset: 80) in her mother; Lung cancer (age of onset: 72) in her brother; Lung cancer (age of onset: 79) in her brother; No Known Problems in her daughter, daughter, father, maternal grandfather, maternal grandmother, paternal grandfather, paternal grandmother, sister, and son  She  reports that she has never smoked  She has never used smokeless tobacco  She reports that she does not drink alcohol and does not use drugs    Current Outpatient Medications   Medication Sig Dispense Refill    amLODIPine (NORVASC) 5 mg tablet Take 1 tablet (5 mg total) by mouth daily 90 tablet 1    Ascorbic Acid (vitamin C) 1000 MG tablet Take 1,000 mg by mouth daily      aspirin (ECOTRIN LOW STRENGTH) 81 mg EC tablet Take 81 mg by mouth daily       cholecalciferol (VITAMIN D3) 25 mcg (1,000 units) tablet Take 1,000 Units by mouth daily      citalopram (CeleXA) 20 mg tablet TAKE 1/2 OR 1 TAB DAILY AS DIRECTED BY DOCTOR 90 tablet 3    dorzolamide (TRUSOPT) 2 % ophthalmic solution       Lumigan 0 01 % ophthalmic drops       olmesartan (BENICAR) 20 mg tablet TAKE 1 TAB ONCE A DAY EXCEPT FOR HIGH BP TAKE 2 TABS IF ABOVE 794 FOR SYSTOLIC 987 tablet 0    omeprazole (PriLOSEC) 20 mg delayed release capsule       rosuvastatin (CRESTOR) 5 mg tablet Take 1 tablet (5 mg total) by mouth daily at bedtime 90 tablet 1    sodium chloride (OCEAN) 0 65 % nasal spray 1 spray into each nostril every 2 (two) hours while awake 60 mL 1    Travatan Z 0 004 % ophthalmic solution       amoxicillin (AMOXIL) 500 mg capsule Take 1 capsule (500 mg total) by mouth every 8 (eight) hours for 5 days 15 capsule 0    Zoster Vac Recomb Adjuvanted (Shingrix) 50 MCG/0 5ML SUSR Inject 0 5 mL into a muscle once for 1 dose Repeat dose in 2 to 6 months 1 each 1     No current facility-administered medications for this visit  Current Outpatient Medications on File Prior to Visit   Medication Sig    amLODIPine (NORVASC) 5 mg tablet Take 1 tablet (5 mg total) by mouth daily    Ascorbic Acid (vitamin C) 1000 MG tablet Take 1,000 mg by mouth daily    aspirin (ECOTRIN LOW STRENGTH) 81 mg EC tablet Take 81 mg by mouth daily     cholecalciferol (VITAMIN D3) 25 mcg (1,000 units) tablet Take 1,000 Units by mouth daily    citalopram (CeleXA) 20 mg tablet TAKE 1/2 OR 1 TAB DAILY AS DIRECTED BY DOCTOR    dorzolamide (TRUSOPT) 2 % ophthalmic solution     Lumigan 0 01 % ophthalmic drops     olmesartan (BENICAR) 20 mg tablet TAKE 1 TAB ONCE A DAY EXCEPT FOR HIGH BP TAKE 2 TABS IF ABOVE 221 FOR SYSTOLIC    omeprazole (PriLOSEC) 20 mg delayed release capsule     rosuvastatin (CRESTOR) 5 mg tablet Take 1 tablet (5 mg total) by mouth daily at bedtime    sodium chloride (OCEAN) 0 65 % nasal spray 1 spray into each nostril every 2 (two) hours while awake    Travatan Z 0 004 % ophthalmic solution      No current facility-administered medications on file prior to visit       She has No Known Allergies       Review of Systems   Constitutional: Negative for appetite change, chills, fatigue and fever  HENT: Negative for sore throat and trouble swallowing  Eyes: Negative for redness  Respiratory: Positive for cough  Negative for shortness of breath  Cardiovascular: Negative for chest pain and palpitations  Gastrointestinal: Negative for abdominal pain, constipation and diarrhea  Genitourinary: Negative for dysuria and hematuria  Musculoskeletal: Negative for back pain and neck pain  Skin: Negative for rash  Neurological: Negative for seizures, weakness and headaches  Hematological: Negative for adenopathy  Psychiatric/Behavioral: Negative for confusion  The patient is not nervous/anxious  Objective:      /58 (BP Location: Right arm, Patient Position: Sitting, Cuff Size: Standard)   Pulse 60   Temp 97 6 °F (36 4 °C) (Temporal)   Ht 5' 2" (1 575 m)   Wt 62 6 kg (138 lb)   SpO2 98%   BMI 25 24 kg/m²     Results Reviewed     None          No results found for this or any previous visit (from the past 1344 hour(s))  Physical Exam  Constitutional:       General: She is not in acute distress  Appearance: Normal appearance  HENT:      Head: Normocephalic and atraumatic  Nose: Nose normal       Mouth/Throat:      Mouth: Mucous membranes are moist    Eyes:      Extraocular Movements: Extraocular movements intact  Pupils: Pupils are equal, round, and reactive to light  Cardiovascular:      Rate and Rhythm: Normal rate and regular rhythm  Pulses: Normal pulses  Heart sounds: Normal heart sounds  No murmur heard  No friction rub  Pulmonary:      Effort: Pulmonary effort is normal  No respiratory distress  Breath sounds: Normal breath sounds  No wheezing  Abdominal:      General: Abdomen is flat  Bowel sounds are normal  There is no distension  Palpations: Abdomen is soft  There is no mass  Tenderness:  There is no abdominal tenderness  There is no guarding  Musculoskeletal:         General: Normal range of motion  Cervical back: Normal range of motion  Neurological:      General: No focal deficit present  Mental Status: She is alert and oriented to person, place, and time  Mental status is at baseline  Cranial Nerves: No cranial nerve deficit     Psychiatric:         Mood and Affect: Mood normal          Behavior: Behavior normal

## 2022-03-10 ENCOUNTER — APPOINTMENT (OUTPATIENT)
Dept: LAB | Facility: HOSPITAL | Age: 82
End: 2022-03-10
Attending: INTERNAL MEDICINE
Payer: MEDICARE

## 2022-03-10 DIAGNOSIS — I10 ESSENTIAL HYPERTENSION: ICD-10-CM

## 2022-03-10 DIAGNOSIS — E55.9 VITAMIN D DEFICIENCY: ICD-10-CM

## 2022-03-10 DIAGNOSIS — M81.0 AGE-RELATED OSTEOPOROSIS WITHOUT CURRENT PATHOLOGICAL FRACTURE: ICD-10-CM

## 2022-03-10 DIAGNOSIS — R41.3 MEMORY LOSS: ICD-10-CM

## 2022-03-10 DIAGNOSIS — E78.2 MIXED HYPERLIPIDEMIA: ICD-10-CM

## 2022-03-10 LAB
25(OH)D3 SERPL-MCNC: 30.6 NG/ML (ref 30–100)
ALBUMIN SERPL BCP-MCNC: 3.8 G/DL (ref 3.5–5)
ALP SERPL-CCNC: 64 U/L (ref 46–116)
ALT SERPL W P-5'-P-CCNC: 20 U/L (ref 12–78)
ANION GAP SERPL CALCULATED.3IONS-SCNC: 8 MMOL/L (ref 4–13)
AST SERPL W P-5'-P-CCNC: 17 U/L (ref 5–45)
BACTERIA UR QL AUTO: ABNORMAL /HPF
BASOPHILS # BLD AUTO: 0.06 THOUSANDS/ΜL (ref 0–0.1)
BASOPHILS NFR BLD AUTO: 1 % (ref 0–1)
BILIRUB SERPL-MCNC: 0.56 MG/DL (ref 0.2–1)
BILIRUB UR QL STRIP: NEGATIVE
BUN SERPL-MCNC: 18 MG/DL (ref 5–25)
CALCIUM SERPL-MCNC: 9 MG/DL (ref 8.3–10.1)
CHLORIDE SERPL-SCNC: 108 MMOL/L (ref 100–108)
CHOLEST SERPL-MCNC: 255 MG/DL
CLARITY UR: CLEAR
CO2 SERPL-SCNC: 22 MMOL/L (ref 21–32)
COLOR UR: YELLOW
CREAT SERPL-MCNC: 0.85 MG/DL (ref 0.6–1.3)
EOSINOPHIL # BLD AUTO: 0.11 THOUSAND/ΜL (ref 0–0.61)
EOSINOPHIL NFR BLD AUTO: 2 % (ref 0–6)
ERYTHROCYTE [DISTWIDTH] IN BLOOD BY AUTOMATED COUNT: 13 % (ref 11.6–15.1)
GFR SERPL CREATININE-BSD FRML MDRD: 64 ML/MIN/1.73SQ M
GLUCOSE P FAST SERPL-MCNC: 119 MG/DL (ref 65–99)
GLUCOSE UR STRIP-MCNC: NEGATIVE MG/DL
HCT VFR BLD AUTO: 38.1 % (ref 34.8–46.1)
HDLC SERPL-MCNC: 66 MG/DL
HGB BLD-MCNC: 13.1 G/DL (ref 11.5–15.4)
HGB UR QL STRIP.AUTO: NEGATIVE
IMM GRANULOCYTES # BLD AUTO: 0.02 THOUSAND/UL (ref 0–0.2)
IMM GRANULOCYTES NFR BLD AUTO: 0 % (ref 0–2)
KETONES UR STRIP-MCNC: NEGATIVE MG/DL
LDLC SERPL CALC-MCNC: 155 MG/DL (ref 0–100)
LEUKOCYTE ESTERASE UR QL STRIP: ABNORMAL
LYMPHOCYTES # BLD AUTO: 2.56 THOUSANDS/ΜL (ref 0.6–4.47)
LYMPHOCYTES NFR BLD AUTO: 42 % (ref 14–44)
MCH RBC QN AUTO: 32.3 PG (ref 26.8–34.3)
MCHC RBC AUTO-ENTMCNC: 34.4 G/DL (ref 31.4–37.4)
MCV RBC AUTO: 94 FL (ref 82–98)
MONOCYTES # BLD AUTO: 0.36 THOUSAND/ΜL (ref 0.17–1.22)
MONOCYTES NFR BLD AUTO: 6 % (ref 4–12)
MUCOUS THREADS UR QL AUTO: ABNORMAL
NEUTROPHILS # BLD AUTO: 3.04 THOUSANDS/ΜL (ref 1.85–7.62)
NEUTS SEG NFR BLD AUTO: 49 % (ref 43–75)
NITRITE UR QL STRIP: NEGATIVE
NON-SQ EPI CELLS URNS QL MICRO: ABNORMAL /HPF
NONHDLC SERPL-MCNC: 189 MG/DL
NRBC BLD AUTO-RTO: 0 /100 WBCS
PH UR STRIP.AUTO: 5 [PH]
PLATELET # BLD AUTO: 211 THOUSANDS/UL (ref 149–390)
PMV BLD AUTO: 10.1 FL (ref 8.9–12.7)
POTASSIUM SERPL-SCNC: 4.2 MMOL/L (ref 3.5–5.3)
PROT SERPL-MCNC: 7.9 G/DL (ref 6.4–8.2)
PROT UR STRIP-MCNC: NEGATIVE MG/DL
PTH-INTACT SERPL-MCNC: 77.5 PG/ML (ref 18.4–80.1)
RBC # BLD AUTO: 4.05 MILLION/UL (ref 3.81–5.12)
RBC #/AREA URNS AUTO: ABNORMAL /HPF
SODIUM SERPL-SCNC: 138 MMOL/L (ref 136–145)
SP GR UR STRIP.AUTO: 1.02 (ref 1–1.03)
TRIGL SERPL-MCNC: 171 MG/DL
TSH SERPL DL<=0.05 MIU/L-ACNC: 1.84 UIU/ML (ref 0.36–3.74)
UROBILINOGEN UR STRIP-ACNC: <2 MG/DL
VIT B12 SERPL-MCNC: 831 PG/ML (ref 100–900)
WBC # BLD AUTO: 6.15 THOUSAND/UL (ref 4.31–10.16)
WBC #/AREA URNS AUTO: ABNORMAL /HPF

## 2022-03-10 PROCEDURE — 84443 ASSAY THYROID STIM HORMONE: CPT

## 2022-03-10 PROCEDURE — 85025 COMPLETE CBC W/AUTO DIFF WBC: CPT

## 2022-03-10 PROCEDURE — 83970 ASSAY OF PARATHORMONE: CPT

## 2022-03-10 PROCEDURE — 36415 COLL VENOUS BLD VENIPUNCTURE: CPT

## 2022-03-10 PROCEDURE — 80061 LIPID PANEL: CPT

## 2022-03-10 PROCEDURE — 81001 URINALYSIS AUTO W/SCOPE: CPT | Performed by: INTERNAL MEDICINE

## 2022-03-10 PROCEDURE — 82306 VITAMIN D 25 HYDROXY: CPT

## 2022-03-10 PROCEDURE — 82607 VITAMIN B-12: CPT

## 2022-03-10 PROCEDURE — 80053 COMPREHEN METABOLIC PANEL: CPT

## 2022-04-06 ENCOUNTER — LAB REQUISITION (OUTPATIENT)
Dept: LAB | Facility: HOSPITAL | Age: 82
End: 2022-04-06
Payer: MEDICARE

## 2022-04-06 DIAGNOSIS — K31.89 OTHER DISEASES OF STOMACH AND DUODENUM: ICD-10-CM

## 2022-04-06 DIAGNOSIS — K22.70 BARRETT'S ESOPHAGUS WITHOUT DYSPLASIA: ICD-10-CM

## 2022-04-06 DIAGNOSIS — K22.89 OTHER SPECIFIED DISEASE OF ESOPHAGUS: ICD-10-CM

## 2022-04-06 PROCEDURE — 88305 TISSUE EXAM BY PATHOLOGIST: CPT | Performed by: PATHOLOGY

## 2022-04-06 PROCEDURE — 88313 SPECIAL STAINS GROUP 2: CPT | Performed by: PATHOLOGY

## 2022-04-12 ENCOUNTER — OFFICE VISIT (OUTPATIENT)
Dept: INTERNAL MEDICINE CLINIC | Facility: CLINIC | Age: 82
End: 2022-04-12
Payer: MEDICARE

## 2022-04-12 VITALS
TEMPERATURE: 97 F | WEIGHT: 137 LBS | SYSTOLIC BLOOD PRESSURE: 140 MMHG | OXYGEN SATURATION: 98 % | DIASTOLIC BLOOD PRESSURE: 70 MMHG | HEART RATE: 62 BPM | HEIGHT: 62 IN | BODY MASS INDEX: 25.21 KG/M2

## 2022-04-12 DIAGNOSIS — M54.6 ACUTE RIGHT-SIDED THORACIC BACK PAIN: ICD-10-CM

## 2022-04-12 DIAGNOSIS — I10 ESSENTIAL HYPERTENSION: ICD-10-CM

## 2022-04-12 DIAGNOSIS — S23.41XA SPRAIN OF COSTOCHONDRAL JOINT, INITIAL ENCOUNTER: Primary | ICD-10-CM

## 2022-04-12 PROCEDURE — 99214 OFFICE O/P EST MOD 30 MIN: CPT | Performed by: INTERNAL MEDICINE

## 2022-04-12 NOTE — PROGRESS NOTES
Assessment/Plan: This is 58-year-old lady who had a EGD and colonoscopy done last Wednesday  Subsequently on Thursday she developed right-sided upper thoracic and anterior chest pain with movement  On examination there is a click at the right costochondral area around rib 3  She does admit to lifting something heavy prior to this happening  Tylenol was recommended and monitoring  1  Sprain of costochondral joint, initial encounter  Comments:  Click at the costochondral joint 3rd rib  Orders:  -     XR ribs right w pa chest min 3 views; Future; Expected date: 04/12/2022    2  Acute right-sided thoracic back pain  Comments: This was felt to be musculoskeletal in nature and Tylenol was recommended  3  Essential hypertension  Comments:  Continue amlodipine and olmesartan  1  Sprain of costochondral joint, initial encounter         No problem-specific Assessment & Plan notes found for this encounter  Subjective:      Patient ID: Willie Cardona is a 79 y o  female  This is 58-year-old lady who had a EGD and colonoscopy done last Wednesday  Subsequently on Thursday she developed right-sided upper thoracic and anterior chest pain with movement  On examination there is a click at the right costochondral area around rib 3  The following portions of the patient's history were reviewed and updated as appropriate: She  has a past medical history of Aortic insufficiency, Cee's esophagus, Gastroesophageal reflux disease, Glaucoma, Hypercholesterolemia, Hyperlipidemia, Hypertension, Kidney stones (2009), Low back pain, Major depressive disorder, Mitral valve insufficiency, Osteoporosis, Osteoporosis, and Vitamin D deficiency    She   Patient Active Problem List    Diagnosis Date Noted    Pancreatic cyst 03/19/2021    Vitamin D deficiency 03/19/2021    Dysthymia 03/19/2021    Gastroesophageal reflux disease without esophagitis 03/19/2021    Essential hypertension 01/19/2018    Hyperlipemia 01/19/2018    Anxiety 01/19/2018    Bradycardia 01/19/2018    Dizziness 01/19/2018     She  has a past surgical history that includes Cystoscopy (2009); Other surgical history; Colonoscopy (06/02/2011); and Mammo (historical) (09/19/2016)  Her family history includes Cancer (age of onset: 80) in her mother; Lung cancer (age of onset: 72) in her brother; Lung cancer (age of onset: 79) in her brother; No Known Problems in her daughter, daughter, father, maternal grandfather, maternal grandmother, paternal grandfather, paternal grandmother, sister, and son  She  reports that she has never smoked  She has never used smokeless tobacco  She reports that she does not drink alcohol and does not use drugs  Current Outpatient Medications   Medication Sig Dispense Refill    amLODIPine (NORVASC) 5 mg tablet Take 1 tablet (5 mg total) by mouth daily 90 tablet 1    Ascorbic Acid (vitamin C) 1000 MG tablet Take 1,000 mg by mouth daily      aspirin (ECOTRIN LOW STRENGTH) 81 mg EC tablet Take 81 mg by mouth daily       cholecalciferol (VITAMIN D3) 25 mcg (1,000 units) tablet Take 1,000 Units by mouth daily      citalopram (CeleXA) 20 mg tablet TAKE 1/2 OR 1 TAB DAILY AS DIRECTED BY DOCTOR 90 tablet 3    dorzolamide (TRUSOPT) 2 % ophthalmic solution       Lumigan 0 01 % ophthalmic drops       olmesartan (BENICAR) 20 mg tablet TAKE 1 TAB ONCE A DAY EXCEPT FOR HIGH BP TAKE 2 TABS IF ABOVE 238 FOR SYSTOLIC 732 tablet 0    omeprazole (PriLOSEC) 20 mg delayed release capsule       rosuvastatin (CRESTOR) 5 mg tablet Take 1 tablet (5 mg total) by mouth daily at bedtime 90 tablet 1    sodium chloride (OCEAN) 0 65 % nasal spray 1 spray into each nostril every 2 (two) hours while awake 60 mL 1    Travatan Z 0 004 % ophthalmic solution        No current facility-administered medications for this visit       Current Outpatient Medications on File Prior to Visit   Medication Sig    amLODIPine (NORVASC) 5 mg tablet Take 1 tablet (5 mg total) by mouth daily    Ascorbic Acid (vitamin C) 1000 MG tablet Take 1,000 mg by mouth daily    aspirin (ECOTRIN LOW STRENGTH) 81 mg EC tablet Take 81 mg by mouth daily     cholecalciferol (VITAMIN D3) 25 mcg (1,000 units) tablet Take 1,000 Units by mouth daily    citalopram (CeleXA) 20 mg tablet TAKE 1/2 OR 1 TAB DAILY AS DIRECTED BY DOCTOR    dorzolamide (TRUSOPT) 2 % ophthalmic solution     Lumigan 0 01 % ophthalmic drops     olmesartan (BENICAR) 20 mg tablet TAKE 1 TAB ONCE A DAY EXCEPT FOR HIGH BP TAKE 2 TABS IF ABOVE 230 FOR SYSTOLIC    omeprazole (PriLOSEC) 20 mg delayed release capsule     rosuvastatin (CRESTOR) 5 mg tablet Take 1 tablet (5 mg total) by mouth daily at bedtime    sodium chloride (OCEAN) 0 65 % nasal spray 1 spray into each nostril every 2 (two) hours while awake    Travatan Z 0 004 % ophthalmic solution      No current facility-administered medications on file prior to visit  She has No Known Allergies       Review of Systems   Constitutional: Negative for appetite change, chills, fatigue and fever  HENT: Negative for sore throat and trouble swallowing  Eyes: Negative for redness  Respiratory: Negative for shortness of breath  Cardiovascular: Negative for chest pain and palpitations  Gastrointestinal: Negative for abdominal pain, constipation and diarrhea  Genitourinary: Negative for dysuria and hematuria  Musculoskeletal: Negative for back pain and neck pain  Skin: Negative for rash  Neurological: Negative for seizures, weakness and headaches  Hematological: Negative for adenopathy  Psychiatric/Behavioral: Negative for confusion  The patient is not nervous/anxious            Objective:      /70 (BP Location: Right arm, Patient Position: Sitting, Cuff Size: Standard)   Pulse 62   Temp (!) 97 °F (36 1 °C) (Temporal)   Ht 5' 2" (1 575 m)   Wt 62 1 kg (137 lb)   SpO2 98%   BMI 25 06 kg/m²     Results Reviewed     None Recent Results (from the past 1344 hour(s))   Urinalysis with microscopic    Collection Time: 03/10/22 10:41 AM   Result Value Ref Range    Color, UA Yellow     Clarity, UA Clear     Specific Gravity, UA 1 023 1 003 - 1 030    pH, UA 5 0 4 5, 5 0, 5 5, 6 0, 6 5, 7 0, 7 5, 8 0    Leukocytes, UA Trace (A) Negative    Nitrite, UA Negative Negative    Protein, UA Negative Negative mg/dl    Glucose, UA Negative Negative mg/dl    Ketones, UA Negative Negative mg/dl    Urobilinogen, UA <2 0 <2 0 mg/dl mg/dl    Bilirubin, UA Negative Negative    Blood, UA Negative Negative    RBC, UA 1-2 None Seen, 1-2 /hpf    WBC, UA 1-2 None Seen, 1-2 /hpf    Epithelial Cells Occasional None Seen, Occasional /hpf    Bacteria, UA None Seen None Seen, Occasional /hpf    MUCUS THREADS Occasional (A) None Seen   CBC and differential    Collection Time: 03/10/22 10:41 AM   Result Value Ref Range    WBC 6 15 4 31 - 10 16 Thousand/uL    RBC 4 05 3 81 - 5 12 Million/uL    Hemoglobin 13 1 11 5 - 15 4 g/dL    Hematocrit 38 1 34 8 - 46 1 %    MCV 94 82 - 98 fL    MCH 32 3 26 8 - 34 3 pg    MCHC 34 4 31 4 - 37 4 g/dL    RDW 13 0 11 6 - 15 1 %    MPV 10 1 8 9 - 12 7 fL    Platelets 696 890 - 280 Thousands/uL    nRBC 0 /100 WBCs    Neutrophils Relative 49 43 - 75 %    Immat GRANS % 0 0 - 2 %    Lymphocytes Relative 42 14 - 44 %    Monocytes Relative 6 4 - 12 %    Eosinophils Relative 2 0 - 6 %    Basophils Relative 1 0 - 1 %    Neutrophils Absolute 3 04 1 85 - 7 62 Thousands/µL    Immature Grans Absolute 0 02 0 00 - 0 20 Thousand/uL    Lymphocytes Absolute 2 56 0 60 - 4 47 Thousands/µL    Monocytes Absolute 0 36 0 17 - 1 22 Thousand/µL    Eosinophils Absolute 0 11 0 00 - 0 61 Thousand/µL    Basophils Absolute 0 06 0 00 - 0 10 Thousands/µL   Comprehensive metabolic panel    Collection Time: 03/10/22 10:41 AM   Result Value Ref Range    Sodium 138 136 - 145 mmol/L    Potassium 4 2 3 5 - 5 3 mmol/L    Chloride 108 100 - 108 mmol/L    CO2 22 21 - 32 mmol/L    ANION GAP 8 4 - 13 mmol/L    BUN 18 5 - 25 mg/dL    Creatinine 0 85 0 60 - 1 30 mg/dL    Glucose, Fasting 119 (H) 65 - 99 mg/dL    Calcium 9 0 8 3 - 10 1 mg/dL    AST 17 5 - 45 U/L    ALT 20 12 - 78 U/L    Alkaline Phosphatase 64 46 - 116 U/L    Total Protein 7 9 6 4 - 8 2 g/dL    Albumin 3 8 3 5 - 5 0 g/dL    Total Bilirubin 0 56 0 20 - 1 00 mg/dL    eGFR 64 ml/min/1 73sq m   Lipid panel    Collection Time: 03/10/22 10:41 AM   Result Value Ref Range    Cholesterol 255 (H) See Comment mg/dL    Triglycerides 171 (H) See Comment mg/dL    HDL, Direct 66 >=50 mg/dL    LDL Calculated 155 (H) 0 - 100 mg/dL    Non-HDL-Chol (CHOL-HDL) 189 mg/dl   Vitamin B12    Collection Time: 03/10/22 10:41 AM   Result Value Ref Range    Vitamin B-12 831 100 - 900 pg/mL   Vitamin D 25 hydroxy    Collection Time: 03/10/22 10:41 AM   Result Value Ref Range    Vit D, 25-Hydroxy 30 6 30 0 - 100 0 ng/mL   TSH, 3rd generation    Collection Time: 03/10/22 10:41 AM   Result Value Ref Range    TSH 3RD GENERATON 1 840 0 358 - 3 740 uIU/mL   PTH, intact    Collection Time: 03/10/22 10:41 AM   Result Value Ref Range    PTH 77 5 18 4 - 80 1 pg/mL        Physical Exam  Constitutional:       General: She is not in acute distress  Appearance: Normal appearance  HENT:      Head: Normocephalic and atraumatic  Nose: Nose normal       Mouth/Throat:      Mouth: Mucous membranes are moist    Eyes:      Extraocular Movements: Extraocular movements intact  Pupils: Pupils are equal, round, and reactive to light  Cardiovascular:      Rate and Rhythm: Normal rate and regular rhythm  Pulses: Normal pulses  Heart sounds: Normal heart sounds  No murmur heard  No friction rub  Pulmonary:      Effort: Pulmonary effort is normal  No respiratory distress  Breath sounds: Normal breath sounds  No wheezing  Abdominal:      General: Abdomen is flat  Bowel sounds are normal  There is no distension        Palpations: Abdomen is soft  There is no mass  Tenderness: There is no abdominal tenderness  There is no guarding  Musculoskeletal:         General: Normal range of motion  Comments: Click at the costochondral area is appreciated with deep breathing  Neurological:      General: No focal deficit present  Mental Status: She is alert and oriented to person, place, and time  Mental status is at baseline  Cranial Nerves: No cranial nerve deficit     Psychiatric:         Mood and Affect: Mood normal          Behavior: Behavior normal

## 2022-05-26 DIAGNOSIS — E78.2 MIXED HYPERLIPIDEMIA: ICD-10-CM

## 2022-05-26 RX ORDER — ROSUVASTATIN CALCIUM 5 MG/1
5 TABLET, COATED ORAL
Qty: 90 TABLET | Refills: 1 | Status: SHIPPED | OUTPATIENT
Start: 2022-05-26

## 2022-06-02 DIAGNOSIS — I10 ESSENTIAL HYPERTENSION: ICD-10-CM

## 2022-06-02 RX ORDER — OLMESARTAN MEDOXOMIL 20 MG/1
20 TABLET ORAL 2 TIMES DAILY
Qty: 180 TABLET | Refills: 0 | Status: SHIPPED | OUTPATIENT
Start: 2022-06-02

## 2022-06-02 NOTE — TELEPHONE ENCOUNTER
Refill Request (90 day supply)     Olmesartan 20 mg    Pharmacy: [id number] [facility]     LA: 4/12/22  NA: 7/5/22

## 2022-06-16 ENCOUNTER — HOSPITAL ENCOUNTER (OUTPATIENT)
Facility: HOSPITAL | Age: 82
Setting detail: OBSERVATION
Discharge: HOME | End: 2022-06-17
Attending: EMERGENCY MEDICINE | Admitting: INTERNAL MEDICINE
Payer: MEDICARE

## 2022-06-16 ENCOUNTER — APPOINTMENT (EMERGENCY)
Dept: RADIOLOGY | Facility: HOSPITAL | Age: 82
End: 2022-06-16
Payer: MEDICARE
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
## 2022-06-16 DIAGNOSIS — S09.90XA CLOSED HEAD INJURY, INITIAL ENCOUNTER: ICD-10-CM

## 2022-06-16 DIAGNOSIS — R55 SYNCOPE: ICD-10-CM

## 2022-06-16 LAB
2HR DELTA HS TROPONIN: 0 NG/L
4HR DELTA HS TROPONIN: 1 NG/L
ALBUMIN SERPL BCP-MCNC: 3.3 G/DL (ref 3.5–5)
ALP SERPL-CCNC: 64 U/L (ref 46–116)
ALT SERPL W P-5'-P-CCNC: 28 U/L (ref 12–78)
ANION GAP SERPL CALCULATED.3IONS-SCNC: 4 MMOL/L (ref 4–13)
ANION GAP SERPL CALCULATED.3IONS-SCNC: 7 MMOL/L (ref 4–13)
AST SERPL W P-5'-P-CCNC: 76 U/L (ref 5–45)
ATRIAL RATE: 60 BPM
BASOPHILS # BLD AUTO: 0.04 THOUSANDS/ΜL (ref 0–0.1)
BASOPHILS NFR BLD AUTO: 1 % (ref 0–1)
BILIRUB SERPL-MCNC: 0.35 MG/DL (ref 0.2–1)
BUN SERPL-MCNC: 12 MG/DL (ref 5–25)
BUN SERPL-MCNC: 13 MG/DL (ref 5–25)
CALCIUM ALBUM COR SERPL-MCNC: 9.5 MG/DL (ref 8.3–10.1)
CALCIUM SERPL-MCNC: 8.9 MG/DL (ref 8.3–10.1)
CALCIUM SERPL-MCNC: 9.2 MG/DL (ref 8.3–10.1)
CARDIAC TROPONIN I PNL SERPL HS: 5 NG/L
CARDIAC TROPONIN I PNL SERPL HS: 5 NG/L
CARDIAC TROPONIN I PNL SERPL HS: 6 NG/L
CHLORIDE SERPL-SCNC: 108 MMOL/L (ref 100–108)
CHLORIDE SERPL-SCNC: 108 MMOL/L (ref 100–108)
CO2 SERPL-SCNC: 20 MMOL/L (ref 21–32)
CO2 SERPL-SCNC: 23 MMOL/L (ref 21–32)
CREAT SERPL-MCNC: 0.87 MG/DL (ref 0.6–1.3)
CREAT SERPL-MCNC: 0.9 MG/DL (ref 0.6–1.3)
EOSINOPHIL # BLD AUTO: 0.06 THOUSAND/ΜL (ref 0–0.61)
EOSINOPHIL NFR BLD AUTO: 1 % (ref 0–6)
ERYTHROCYTE [DISTWIDTH] IN BLOOD BY AUTOMATED COUNT: 12.7 % (ref 11.6–15.1)
GFR SERPL CREATININE-BSD FRML MDRD: 59 ML/MIN/1.73SQ M
GFR SERPL CREATININE-BSD FRML MDRD: 62 ML/MIN/1.73SQ M
GLUCOSE SERPL-MCNC: 106 MG/DL (ref 65–140)
GLUCOSE SERPL-MCNC: 108 MG/DL (ref 65–140)
GLUCOSE SERPL-MCNC: 109 MG/DL (ref 65–140)
HCT VFR BLD AUTO: 40.4 % (ref 34.8–46.1)
HGB BLD-MCNC: 13.2 G/DL (ref 11.5–15.4)
IMM GRANULOCYTES # BLD AUTO: 0.03 THOUSAND/UL (ref 0–0.2)
IMM GRANULOCYTES NFR BLD AUTO: 1 % (ref 0–2)
LYMPHOCYTES # BLD AUTO: 2.12 THOUSANDS/ΜL (ref 0.6–4.47)
LYMPHOCYTES NFR BLD AUTO: 33 % (ref 14–44)
MCH RBC QN AUTO: 31.9 PG (ref 26.8–34.3)
MCHC RBC AUTO-ENTMCNC: 32.7 G/DL (ref 31.4–37.4)
MCV RBC AUTO: 98 FL (ref 82–98)
MONOCYTES # BLD AUTO: 0.41 THOUSAND/ΜL (ref 0.17–1.22)
MONOCYTES NFR BLD AUTO: 6 % (ref 4–12)
NEUTROPHILS # BLD AUTO: 3.86 THOUSANDS/ΜL (ref 1.85–7.62)
NEUTS SEG NFR BLD AUTO: 58 % (ref 43–75)
NRBC BLD AUTO-RTO: 0 /100 WBCS
P AXIS: 55 DEGREES
PLATELET # BLD AUTO: 180 THOUSANDS/UL (ref 149–390)
PLATELET # BLD AUTO: 186 THOUSANDS/UL (ref 149–390)
PMV BLD AUTO: 10 FL (ref 8.9–12.7)
PMV BLD AUTO: 10.3 FL (ref 8.9–12.7)
POTASSIUM SERPL-SCNC: 4 MMOL/L (ref 3.5–5.3)
POTASSIUM SERPL-SCNC: 6.2 MMOL/L (ref 3.5–5.3)
PR INTERVAL: 124 MS
PROT SERPL-MCNC: 8.2 G/DL (ref 6.4–8.2)
QRS AXIS: 63 DEGREES
QRSD INTERVAL: 82 MS
QT INTERVAL: 412 MS
QTC INTERVAL: 435 MS
RBC # BLD AUTO: 4.14 MILLION/UL (ref 3.81–5.12)
SODIUM SERPL-SCNC: 132 MMOL/L (ref 136–145)
SODIUM SERPL-SCNC: 138 MMOL/L (ref 136–145)
T WAVE AXIS: -88 DEGREES
VENTRICULAR RATE: 67 BPM
WBC # BLD AUTO: 6.52 THOUSAND/UL (ref 4.31–10.16)

## 2022-06-16 PROCEDURE — 85049 AUTOMATED PLATELET COUNT: CPT | Performed by: STUDENT IN AN ORGANIZED HEALTH CARE EDUCATION/TRAINING PROGRAM

## 2022-06-16 PROCEDURE — 99285 EMERGENCY DEPT VISIT HI MDM: CPT

## 2022-06-16 PROCEDURE — 93005 ELECTROCARDIOGRAM TRACING: CPT

## 2022-06-16 PROCEDURE — 36415 COLL VENOUS BLD VENIPUNCTURE: CPT | Performed by: EMERGENCY MEDICINE

## 2022-06-16 PROCEDURE — 99285 EMERGENCY DEPT VISIT HI MDM: CPT | Performed by: EMERGENCY MEDICINE

## 2022-06-16 PROCEDURE — 80048 BASIC METABOLIC PNL TOTAL CA: CPT | Performed by: STUDENT IN AN ORGANIZED HEALTH CARE EDUCATION/TRAINING PROGRAM

## 2022-06-16 PROCEDURE — 84484 ASSAY OF TROPONIN QUANT: CPT | Performed by: EMERGENCY MEDICINE

## 2022-06-16 PROCEDURE — 80053 COMPREHEN METABOLIC PANEL: CPT | Performed by: EMERGENCY MEDICINE

## 2022-06-16 PROCEDURE — 70450 CT HEAD/BRAIN W/O DYE: CPT

## 2022-06-16 PROCEDURE — 84484 ASSAY OF TROPONIN QUANT: CPT | Performed by: STUDENT IN AN ORGANIZED HEALTH CARE EDUCATION/TRAINING PROGRAM

## 2022-06-16 PROCEDURE — 85025 COMPLETE CBC W/AUTO DIFF WBC: CPT | Performed by: EMERGENCY MEDICINE

## 2022-06-16 PROCEDURE — 93010 ELECTROCARDIOGRAM REPORT: CPT | Performed by: INTERNAL MEDICINE

## 2022-06-16 PROCEDURE — 82948 REAGENT STRIP/BLOOD GLUCOSE: CPT

## 2022-06-16 PROCEDURE — 71046 X-RAY EXAM CHEST 2 VIEWS: CPT

## 2022-06-16 PROCEDURE — G1004 CDSM NDSC: HCPCS

## 2022-06-16 RX ORDER — SENNOSIDES 8.6 MG
1 TABLET ORAL DAILY
Status: DISCONTINUED | OUTPATIENT
Start: 2022-06-16 | End: 2022-06-17 | Stop reason: HOSPADM

## 2022-06-16 RX ORDER — PRAVASTATIN SODIUM 40 MG
40 TABLET ORAL
Status: DISCONTINUED | OUTPATIENT
Start: 2022-06-16 | End: 2022-06-17 | Stop reason: HOSPADM

## 2022-06-16 RX ORDER — CITALOPRAM 10 MG/1
10 TABLET ORAL DAILY
Status: DISCONTINUED | OUTPATIENT
Start: 2022-06-16 | End: 2022-06-16

## 2022-06-16 RX ORDER — ACETAMINOPHEN 325 MG/1
650 TABLET ORAL EVERY 6 HOURS PRN
Status: DISCONTINUED | OUTPATIENT
Start: 2022-06-16 | End: 2022-06-17 | Stop reason: HOSPADM

## 2022-06-16 RX ORDER — LOSARTAN POTASSIUM 50 MG/1
50 TABLET ORAL 2 TIMES DAILY
Status: DISCONTINUED | OUTPATIENT
Start: 2022-06-16 | End: 2022-06-17 | Stop reason: HOSPADM

## 2022-06-16 RX ORDER — LOSARTAN POTASSIUM 50 MG/1
50 TABLET ORAL DAILY
Status: DISCONTINUED | OUTPATIENT
Start: 2022-06-17 | End: 2022-06-16

## 2022-06-16 RX ORDER — AMLODIPINE BESYLATE 5 MG/1
5 TABLET ORAL DAILY
Status: DISCONTINUED | OUTPATIENT
Start: 2022-06-16 | End: 2022-06-16

## 2022-06-16 RX ORDER — MELATONIN
1000 DAILY
Status: DISCONTINUED | OUTPATIENT
Start: 2022-06-16 | End: 2022-06-17 | Stop reason: HOSPADM

## 2022-06-16 RX ORDER — PANTOPRAZOLE SODIUM 40 MG/1
40 TABLET, DELAYED RELEASE ORAL
Status: DISCONTINUED | OUTPATIENT
Start: 2022-06-17 | End: 2022-06-17 | Stop reason: HOSPADM

## 2022-06-16 RX ORDER — ASCORBIC ACID 500 MG
1000 TABLET ORAL DAILY
Status: DISCONTINUED | OUTPATIENT
Start: 2022-06-17 | End: 2022-06-17 | Stop reason: HOSPADM

## 2022-06-16 RX ORDER — AMLODIPINE BESYLATE 5 MG/1
5 TABLET ORAL DAILY
Status: DISCONTINUED | OUTPATIENT
Start: 2022-06-17 | End: 2022-06-17 | Stop reason: HOSPADM

## 2022-06-16 RX ORDER — ASCORBIC ACID 500 MG
1000 TABLET ORAL DAILY
Status: DISCONTINUED | OUTPATIENT
Start: 2022-06-16 | End: 2022-06-16

## 2022-06-16 RX ORDER — LOSARTAN POTASSIUM 50 MG/1
50 TABLET ORAL DAILY
Status: DISCONTINUED | OUTPATIENT
Start: 2022-06-16 | End: 2022-06-16

## 2022-06-16 RX ORDER — ASPIRIN 81 MG/1
81 TABLET ORAL DAILY
Status: DISCONTINUED | OUTPATIENT
Start: 2022-06-17 | End: 2022-06-17 | Stop reason: HOSPADM

## 2022-06-16 RX ORDER — ENOXAPARIN SODIUM 100 MG/ML
40 INJECTION SUBCUTANEOUS DAILY
Status: DISCONTINUED | OUTPATIENT
Start: 2022-06-16 | End: 2022-06-17 | Stop reason: HOSPADM

## 2022-06-16 RX ORDER — ASPIRIN 81 MG/1
81 TABLET ORAL DAILY
Status: DISCONTINUED | OUTPATIENT
Start: 2022-06-16 | End: 2022-06-16

## 2022-06-16 RX ORDER — ACETAMINOPHEN 325 MG/1
975 TABLET ORAL ONCE
Status: COMPLETED | OUTPATIENT
Start: 2022-06-16 | End: 2022-06-16

## 2022-06-16 RX ORDER — CITALOPRAM 10 MG/1
10 TABLET ORAL DAILY
Status: DISCONTINUED | OUTPATIENT
Start: 2022-06-17 | End: 2022-06-17 | Stop reason: HOSPADM

## 2022-06-16 RX ADMIN — Medication 1000 UNITS: at 17:32

## 2022-06-16 RX ADMIN — ENOXAPARIN SODIUM 40 MG: 40 INJECTION SUBCUTANEOUS at 17:32

## 2022-06-16 RX ADMIN — PRAVASTATIN SODIUM 40 MG: 40 TABLET ORAL at 21:34

## 2022-06-16 RX ADMIN — LOSARTAN POTASSIUM 50 MG: 50 TABLET, FILM COATED ORAL at 21:34

## 2022-06-16 RX ADMIN — ACETAMINOPHEN 975 MG: 325 TABLET ORAL at 15:17

## 2022-06-16 NOTE — ED PROVIDER NOTES
Emergency Department Note- [facility] 79 y o  female MRN: [id number]    Unit/Bed#: ED 11 Encounter: [id number]        History of Present Illness   HPI:  Palomo Roth is a 79 y o  female who presents with syncope and possible trauma  Patient does not recall exactly what happened but was found by granddaughter next to a chair on the floor  Patient with bruising to the left arm but no pain  Patient currently able to walk without issue has no headache chest pain abdominal pain  Unknown whether the patient hit her head when she fell  Patient with a history of high blood pressure  A lot of the history is provided by the patient's daughter who is at the bedside      REVIEW OF SYSTEMS    Constitutional: Negative for chills, fatigue and fever  HENT: Negative for ear pain, sore throat and trouble swallowing  Eyes: Negative for photophobia, pain and visual disturbance  Respiratory: Negative for cough, chest tightness and shortness of breath  Cardiovascular: Negative for chest pain and palpitations  Gastrointestinal: Negative for abdominal pain, constipation, diarrhea, nausea and vomiting  Genitourinary: Negative for dysuria, flank pain, frequency and hematuria  Musculoskeletal: Negative for back pain and neck pain  Skin: Negative for color change and rash  Neurological: Negative for dizziness, positive weakness, light-headedness and headaches  Psychiatric/Behavioral: Negative for confusion  The patient is not nervous/anxious      All systems reviewed and negative except as noted above or in HPI         Historical Information   Past Medical History:   Diagnosis Date    Aortic insufficiency     Cee's esophagus     Gastroesophageal reflux disease     Glaucoma     Hypercholesterolemia     Hyperlipidemia     Hypertension     Kidney stones 2009    Low back pain     Major depressive disorder     Mitral valve insufficiency     Osteoporosis     Osteoporosis     Vitamin D deficiency      Past Surgical History:   Procedure Laterality Date    COLONOSCOPY  06/02/2011    Normal     CYSTOSCOPY  2009    Polyp, stones    MAMMO (HISTORICAL)  09/19/2016    Negative     OTHER SURGICAL HISTORY      Infection in arm      Social History   Social History     Substance and Sexual Activity   Alcohol Use No     Social History     Substance and Sexual Activity   Drug Use No     Social History     Tobacco Use   Smoking Status Never Smoker   Smokeless Tobacco Never Used     Family History:   Family History   Problem Relation Age of Onset    Cancer Mother 80        unknown type of abdominal cancer    No Known Problems Father     No Known Problems Sister     No Known Problems Daughter     No Known Problems Maternal Grandmother     No Known Problems Maternal Grandfather     No Known Problems Paternal Grandmother     No Known Problems Paternal Grandfather     No Known Problems Daughter     No Known Problems Son     Lung cancer Brother 79    Lung cancer Brother 72       Meds/Allergies   (Not in a hospital admission)    No Known Allergies    Objective   Vitals: Blood pressure 152/71, pulse 69, temperature 97 9 °F (36 6 °C), temperature source Oral, resp  rate 20, weight 62 1 kg (137 lb), SpO2 95 %, not currently breastfeeding      PHYSICAL EXAM     General Appearance: alert and oriented, nad, non toxic appearing  Skin:  Warm, dry, intact  HEENT: atraumatic, normocephalic, eomi, perll   Neck: Supple, no JVD, no lymphadenopathy, trachea midline, no bruit  Cardiac: rrr, no murmurs, rub, gallops  Pulmonary: lungs cta, no wheezes, rales, rhonchi  Gastrointestinal: abdomen soft nontender, good bs, no mass or bruits, no cva tenderness  Extremities: no pedal edema, good pulses, no calf tenderness, no clubbing, no cyanosis ecchymosis to the left arm with no bony tenderness  Neuro:  no focal motor or sensory deficits, cn intact  Psych:  Normal mood and affect, normal judgement and insight      Lab Results: Lab Results: I have personally reviewed pertinent lab results  Imaging: I have personally reviewed pertinent reports  EKG, Pathology, and Other Studies: I have personally reviewed pertinent films in PACS    Assessment/Plan     ED Medical Decision Making:  Due to the patient's trauma and unknown whether she hit her head and patient is on aspirin will CT head  Will also do cardiac workup for a potential syncope and feel the patient should be admitted as she does not recall what happened  Patient initially had blood pressures in the 200s but came down to 140s on the room  ECG 12 Lead Documentation  Date/Time: today/date: 6/16/2022  Performed by: Stone Schumacher  Authorized by: Stone Schumacher     ECG reviewed by me, the ED Provider: yes    Patient location:  ED   Previous ECG:  Compared to current, no change   Rate:  ECG rate assessment: normal    Rhythm: sinus rhythm    Ectopy:  : none    QRS axis:  Normal  Intervals: normal   Q waves: None   ST segments:  Normal  T waves: normal      Impression:  New T-wave inversions diffusely noted compared to prior EKG in 2018      Portions of the record may have been created with voice recognition software  Occasional wrong word or "sound a like" substitutions may have occurred due to the inherent limitations of voice recognition software  Read the chart carefully and recognize, using context, where substitutions have occurred         Claudine Wolf MD  06/16/22 [id number]

## 2022-06-16 NOTE — ASSESSMENT & PLAN NOTE
· Continue home Celexa -> of note patient stated that she does not take the medication every day at home    Patient was counseled on the importance of taking this medication every day

## 2022-06-16 NOTE — PLAN OF CARE
Problem: Potential for Falls  Goal: Patient will remain free of falls  Description: INTERVENTIONS:  - Educate patient/family on patient safety including physical limitations  - Instruct patient to call for assistance with activity   - Consult OT/PT to assist with strengthening/mobility   - Keep Call bell within reach  - Keep bed low and locked with side rails adjusted as appropriate  - Keep care items and personal belongings within reach  - Initiate and maintain comfort rounds  - Consider moving patient to room near nurses station  Outcome: Progressing     Problem: PAIN - ADULT  Goal: Verbalizes/displays adequate comfort level or baseline comfort level  Description: Interventions:  - Encourage patient to monitor pain and request assistance  - Assess pain using appropriate pain scale  - Administer analgesics based on type and severity of pain and evaluate response  - Implement non-pharmacological measures as appropriate and evaluate response  - Consider cultural and social influences on pain and pain management  - Notify physician/advanced practitioner if interventions unsuccessful or patient reports new pain  Outcome: Progressing     Problem: CARDIOVASCULAR - ADULT  Goal: Maintains optimal cardiac output and hemodynamic stability  Description: INTERVENTIONS:  - Monitor I/O, vital signs and rhythm  - Monitor for S/S and trends of decreased cardiac output  - Administer and titrate ordered vasoactive medications to optimize hemodynamic stability  - Assess quality of pulses, skin color and temperature  - Assess for signs of decreased coronary artery perfusion  - Instruct patient to report change in severity of symptoms  Outcome: Progressing  Goal: Absence of cardiac dysrhythmias or at baseline rhythm  Description: INTERVENTIONS:  - Continuous cardiac monitoring, vital signs, obtain 12 lead EKG if ordered  - Administer antiarrhythmic and heart rate control medications as ordered  - Monitor electrolytes and administer replacement therapy as ordered  Outcome: Progressing

## 2022-06-16 NOTE — ED NOTES
Dr Nani Ball at bedside for pt and Level C Trauma evaluation  No Level C trauma initiation per Dr Nani Washington, RN  06/16/22 [id number]

## 2022-06-16 NOTE — ASSESSMENT & PLAN NOTE
The patient presented emergency department on 06/16 after an unwitnessed fall at home  She has no recollection of the events of what is unclear if it was syncope versus mechanical fall  She complains of a mild headache and a small bruise on her left arm  In the emergency department CT head was negative for any acute intracranial abnormality  ECG showed sinus rhythm with signs of right ventricular conduction delay and ST wave abnormalities  The patient did mention that she experiences intermittent palpitations at home  It is possible the patient sustained a concussion secondary to fall and for this reason does not recall the event  Telemetry showed some kelly events  No arrhythmia  Orthostatic hypotension is negative    Plan:  · Patient is stable  · PT/OT  Evaluated and patient is cleared to go back home  Recommend family to check on patient frequently  · Recommend outpatient echocardiogram upon discharge  · Follow up with PCP and cardiology

## 2022-06-16 NOTE — PROGRESS NOTES
INTERNAL MEDICINE RESIDENCY ADMISSION H&P     Name: Jonathan Staton   Age & Sex: 79 y o  female   MRN: [id number]  Unit/Bed#: [facility] 802-01   Encounter: [id number]  Primary Care Provider: Elizabeth Sawyer MD    Code Status: Level 1 - Full Code  Admission Status: OBSERVATION  Disposition: Patient requires Med/Surg with Telemetry    Admit to team: SOD Team B     ASSESSMENT/PLAN     Principal Problem:    Fall  Active Problems:    Essential hypertension    Hyperlipemia    Anxiety    Gastroesophageal reflux disease without esophagitis      Gastroesophageal reflux disease without esophagitis  Assessment & Plan  · Continue PPI    Anxiety  Assessment & Plan  · Continue home Celexa -> of note patient stated that she does not take the medication every day at home  Patient was counseled on the importance of taking this medication every day    Hyperlipemia  Assessment & Plan  · Continue statin    Essential hypertension  Assessment & Plan  · Continue home amlodipine and losartan (takes olmesartan at home)    * Fall  Assessment & Plan  The patient presented emergency department on 06/16 after an unwitnessed fall at home  She has no recollection of the events of what is unclear if it was syncope versus mechanical fall  She complains of a mild headache and a small bruise on her left arm  In the emergency department CT head was negative for any acute intracranial abnormality  ECG showed sinus rhythm with signs of right ventricular conduction delay and ST wave abnormalities  The patient did mention that she experiences intermittent palpitations at home  It is possible the patient sustained a concussion secondary to fall and for this reason does not recall the event      Plan:  · Continue telemetry monitoring  · Follow-up echocardiogram  · Can consider an outpatient ZIO patch to evaluate for any underlying arrhythmias  · PT/OT consulted  · Continue monitor for any changes in symptoms      VTE Pharmacologic Prophylaxis: Enoxaparin (Lovenox)  VTE Mechanical Prophylaxis: sequential compression device    CHIEF COMPLAINT     Chief Complaint   Patient presents with    Fall     Per Pt's daughter pt had a unwitnessed fall  Pt is confused per daughter, unknown loc, +asa, unknown hs      HISTORY OF PRESENT ILLNESS     The patient is an 79-year-old female with a past medical history of hypertension, GERD, hyperlipidemia, anxiety and osteoporosis who presented to the hospital today after a syncopal episode versus fall at home  Earlier that day the patient was cleaning at her granddaughter's house when she was found by her granddaughter on the floor next to a fallen chair  The patient has no recollection of the event  She does report a mild headache and mild left arm/leg pain  She had no noticeable bruises on her head, but did have a small bruise on her left arm  She is on aspirin at home  She also reported that she occasionally experiences intermittent episodes of palpitations at home  She had never had a syncopal event prior to this one  Otherwise overall she was feeling well  She denies any recent fevers, chills, chest pain, shortness of breath or abdominal pain  In the emergency department the patient was originally found have a blood pressure of 211/81  Upon recheck it had improved to 152/71  The remainder of her vital signs were stable  BMP showed a sodium of 132 and a potassium of 6 2 (moderately hemolyzed, repeat BMP ordered)  HS troponin was 5 in 5 at 2 hours  CBC was unremarkable  CT head showed no acute intracranial abnormality, did show microangiopathic changes  CXR showed no acute cardiopulmonary disease  ECG showed sinus rhythm with signs of right ventricular conduction delay and ST wave abnormalities  The patient was admitted under observation Med surge with continuous telemetry monitoring  She is level 1 full code      REVIEW OF SYSTEMS     Review of Systems   Constitutional: Negative for activity change, appetite change, chills, diaphoresis, fatigue and fever  HENT: Negative for congestion, ear discharge, ear pain, hearing loss, sinus pressure, sinus pain and sore throat  Eyes: Negative for pain, discharge, redness and itching  Respiratory: Negative for cough, chest tightness, shortness of breath and wheezing  Cardiovascular: Positive for palpitations  Negative for chest pain and leg swelling  Gastrointestinal: Negative for abdominal distention, abdominal pain, blood in stool, constipation, diarrhea, nausea and vomiting  Genitourinary: Negative for difficulty urinating, dysuria, flank pain and frequency  Musculoskeletal: Negative for arthralgias, back pain and gait problem  Skin: Negative for color change, pallor and rash  Neurological: Negative for dizziness, weakness, light-headedness, numbness and headaches  Psychiatric/Behavioral: Negative for agitation, behavioral problems, confusion and decreased concentration  OBJECTIVE     Vitals:    22 1330 22 1345 22 1415 22 1540   BP: 149/70 152/71 155/65 (!) 123/106   BP Location: Right arm Right arm Right arm    Pulse: 60 60 56 56   Resp:    20   Temp:    98 1 °F (36 7 °C)   TempSrc:       SpO2: 96% 95% 95% 96%   Weight:       Height:    5' 2" (1 575 m)      Temperature:   Temp (24hrs), Av °F (36 7 °C), Min:97 9 °F (36 6 °C), Max:98 1 °F (36 7 °C)    Temperature: 98 1 °F (36 7 °C)  Intake & Output:  I/O     None        Weights:        Body mass index is 25 06 kg/m²  Weight (last 2 days)     Date/Time Weight    22 1150 62 1 (137)        Physical Exam  Constitutional:       Appearance: She is well-developed  HENT:      Head: Normocephalic and atraumatic  Nose: Nose normal    Eyes:      Conjunctiva/sclera: Conjunctivae normal       Pupils: Pupils are equal, round, and reactive to light  Neck:      Vascular: No JVD  Cardiovascular:      Rate and Rhythm: Normal rate and regular rhythm        Heart sounds: Normal heart sounds  No murmur heard  No friction rub  No gallop  Pulmonary:      Effort: Pulmonary effort is normal  No respiratory distress  Breath sounds: Normal breath sounds  No stridor  No wheezing or rales  Chest:      Chest wall: No tenderness  Abdominal:      General: Bowel sounds are normal  There is no distension  Palpations: Abdomen is soft  There is no mass  Tenderness: There is no abdominal tenderness  There is no guarding or rebound  Hernia: No hernia is present  Musculoskeletal:         General: No tenderness or deformity  Right lower leg: No edema  Left lower leg: No edema  Skin:     General: Skin is warm and dry  Findings: Bruising (Small bruise on bottom of the left upper arm) present  Neurological:      Mental Status: She is alert and oriented to person, place, and time  Psychiatric:         Mood and Affect: Mood normal          Behavior: Behavior normal          Thought Content:  Thought content normal          Judgment: Judgment normal        PAST MEDICAL HISTORY     Past Medical History:   Diagnosis Date    Aortic insufficiency     Cee's esophagus     Gastroesophageal reflux disease     Glaucoma     Hypercholesterolemia     Hyperlipidemia     Hypertension     Kidney stones 2009    Low back pain     Major depressive disorder     Mitral valve insufficiency     Osteoporosis     Osteoporosis     Vitamin D deficiency      PAST SURGICAL HISTORY     Past Surgical History:   Procedure Laterality Date    COLONOSCOPY  06/02/2011    Normal     CYSTOSCOPY  2009    Polyp, stones    MAMMO (HISTORICAL)  09/19/2016    Negative     OTHER SURGICAL HISTORY      Infection in arm      SOCIAL & FAMILY HISTORY     Social History     Substance and Sexual Activity   Alcohol Use No     Substance and Sexual Activity   Alcohol Use No        Substance and Sexual Activity   Drug Use No     Social History     Tobacco Use   Smoking Status Never Smoker Smokeless Tobacco Never Used     Family History   Problem Relation Age of Onset    Cancer Mother 80        unknown type of abdominal cancer    No Known Problems Father     No Known Problems Sister     No Known Problems Daughter     No Known Problems Maternal Grandmother     No Known Problems Maternal Grandfather     No Known Problems Paternal Grandmother     No Known Problems Paternal Grandfather     No Known Problems Daughter     No Known Problems Son     Lung cancer Brother 79    Lung cancer Brother 72     LABORATORY DATA     Labs: I have personally reviewed pertinent reports  Results from last 7 days   Lab Units 06/16/22  1649 06/16/22  1207   WBC Thousand/uL  --  6 52   HEMOGLOBIN g/dL  --  13 2   HEMATOCRIT %  --  40 4   PLATELETS Thousands/uL 180 186   NEUTROS PCT %  --  58   MONOS PCT %  --  6      Results from last 7 days   Lab Units 06/16/22  1207   POTASSIUM mmol/L 6 2*   CHLORIDE mmol/L 108   CO2 mmol/L 20*   BUN mg/dL 13   CREATININE mg/dL 0 90   CALCIUM mg/dL 8 9   ALK PHOS U/L 64   ALT U/L 28   AST U/L 76*                          Micro:  Lab Results   Component Value Date    URINECX <10,000 cfu/ml Gram Negative Jeremy (A) 05/10/2021    URINECX <10,000 cfu/ml  05/07/2021    URINECX 40,000-49,000 cfu/ml  09/03/2019     IMAGING & DIAGNOSTIC TESTS     Imaging: I have personally reviewed pertinent reports  XR chest 2 views    Result Date: 6/16/2022  Impression: No acute cardiopulmonary disease  Workstation performed: [id number]     CT head without contrast    Result Date: 6/16/2022  Impression: No acute intracranial abnormality  Microangiopathic changes  Workstation performed: [id number]     EKG, Pathology, and Other Studies: I have personally reviewed pertinent reports  ALLERGIES   No Known Allergies  MEDICATIONS PRIOR TO ARRIVAL     Prior to Admission medications    Medication Sig Start Date End Date Taking?  Authorizing Provider   amLODIPine (NORVASC) 5 mg tablet Take 1 tablet (5 mg total) by mouth daily 2/22/22   Merline Roller, MD   Ascorbic Acid (vitamin C) 1000 MG tablet Take 1,000 mg by mouth daily    Historical Provider, MD   aspirin (ECOTRIN LOW STRENGTH) 81 mg EC tablet Take 81 mg by mouth daily     Historical Provider, MD   cholecalciferol (VITAMIN D3) 25 mcg (1,000 units) tablet Take 1,000 Units by mouth daily    Historical Provider, MD   citalopram (CeleXA) 20 mg tablet TAKE 1/2 OR 1 TAB DAILY AS DIRECTED BY DOCTOR 12/13/21   Sara Chung MD   dorzolamide (TRUSOPT) 2 % ophthalmic solution  3/2/21   Historical Provider, MD   Lumigan 0 01 % ophthalmic drops  4/23/21   Historical Provider, MD   olmesartan (BENICAR) 20 mg tablet Take 1 tablet (20 mg total) by mouth 2 (two) times a day 6/2/22   Merline Roller, MD   omeprazole (PriLOSEC) 20 mg delayed release capsule  3/10/21   Historical Provider, MD   rosuvastatin (CRESTOR) 5 mg tablet TAKE 1 TABLET (5 MG TOTAL) BY MOUTH DAILY AT BEDTIME 5/26/22   Buzz Vargas MD   sodium chloride (OCEAN) 0 65 % nasal spray 1 spray into each nostril every 2 (two) hours while awake 2/22/22   Merline Roller, MD   Travatan Z 0 004 % ophthalmic solution  1/26/21   Historical Provider, MD     MEDICATIONS ADMINISTERED IN LAST 24 HOURS     Medication Administration - last 24 hours from 06/15/2022 1710 to 06/16/2022 1710       Date/Time Order Dose Route Action Action by     06/16/2022 1517 acetaminophen (TYLENOL) tablet 975 mg 975 mg Oral Given Stacy Daily RN        CURRENT MEDICATIONS     Current Facility-Administered Medications   Medication Dose Route Frequency Provider Last Rate    acetaminophen  650 mg Oral Q6H PRN Priti Joe MD      [START ON 6/17/2022] amLODIPine  5 mg Oral Daily Priti Joe MD      [START ON 6/17/2022] vitamin C  1,000 mg Oral Daily Priti Joe MD      [START ON 6/17/2022] aspirin  81 mg Oral Daily Priti Joe MD      cholecalciferol  1,000 Units Oral Daily Priti Joe MD     [address] [place] [address] ON 6/17/2022] citalopram  10 mg Oral Daily Ellis Vazquez MD      enoxaparin  40 mg Subcutaneous Daily Ellis Vazquez MD      losartan  50 mg Oral BID Ellis Vazquez MD      [START ON 6/17/2022] pantoprazole  40 mg Oral Early Morning Ellis Vazquez MD      pravastatin  40 mg Oral Daily With Aminata Miller MD      senna  1 tablet Oral Daily Ellis Vazquez MD          acetaminophen, 650 mg, Q6H PRN        Admission Time  I spent 1 hour admitting the patient  This involved direct patient contact where I performed a full history and physical, reviewing previous records, and reviewing laboratory and other diagnostic studies  Portions of the record may have been created with voice recognition software  Occasional wrong word or "sound a like" substitutions may have occurred due to the inherent limitations of voice recognition software    Read the chart carefully and recognize, using context, where substitutions have occurred     ==  Ellis Vazquez MD  [address]  Internal Medicine Residency PGY-2

## 2022-06-17 ENCOUNTER — APPOINTMENT (OUTPATIENT)
Dept: NON INVASIVE DIAGNOSTICS | Facility: HOSPITAL | Age: 82
End: 2022-06-17
Payer: MEDICARE

## 2022-06-17 VITALS
HEIGHT: 62 IN | OXYGEN SATURATION: 97 % | BODY MASS INDEX: 25.21 KG/M2 | HEART RATE: 69 BPM | TEMPERATURE: 97.5 F | DIASTOLIC BLOOD PRESSURE: 63 MMHG | RESPIRATION RATE: 22 BRPM | SYSTOLIC BLOOD PRESSURE: 140 MMHG | WEIGHT: 137 LBS

## 2022-06-17 LAB
ANION GAP SERPL CALCULATED.3IONS-SCNC: 4 MMOL/L (ref 4–13)
AORTIC ROOT: 3 CM
APICAL FOUR CHAMBER EJECTION FRACTION: 55 %
AV REGURGITATION PRESSURE HALF TIME: 716 MS
BUN SERPL-MCNC: 13 MG/DL (ref 5–25)
CALCIUM SERPL-MCNC: 9 MG/DL (ref 8.3–10.1)
CHLORIDE SERPL-SCNC: 109 MMOL/L (ref 100–108)
CO2 SERPL-SCNC: 28 MMOL/L (ref 21–32)
CREAT SERPL-MCNC: 0.92 MG/DL (ref 0.6–1.3)
ERYTHROCYTE [DISTWIDTH] IN BLOOD BY AUTOMATED COUNT: 12.7 % (ref 11.6–15.1)
FRACTIONAL SHORTENING: 33 (ref 28–44)
GFR SERPL CREATININE-BSD FRML MDRD: 58 ML/MIN/1.73SQ M
GLUCOSE P FAST SERPL-MCNC: 114 MG/DL (ref 65–99)
GLUCOSE SERPL-MCNC: 114 MG/DL (ref 65–140)
HCT VFR BLD AUTO: 38 % (ref 34.8–46.1)
HGB BLD-MCNC: 12.5 G/DL (ref 11.5–15.4)
INTERVENTRICULAR SEPTUM IN DIASTOLE (PARASTERNAL SHORT AXIS VIEW): 0.9 CM
INTERVENTRICULAR SEPTUM: 0.9 CM (ref 0.6–1.1)
LA/AORTA RATIO 2D: 1.33
LAAS-AP4: 16.7 CM2
LEFT ATRIUM SIZE: 4 CM
LEFT INTERNAL DIMENSION IN SYSTOLE: 3.1 CM (ref 2.1–4)
LEFT VENTRICULAR INTERNAL DIMENSION IN DIASTOLE: 4.6 CM (ref 3.5–6)
LEFT VENTRICULAR POSTERIOR WALL IN END DIASTOLE: 1.1 CM
LEFT VENTRICULAR STROKE VOLUME: 57 ML
LVSV (TEICH): 57 ML
MCH RBC QN AUTO: 32.6 PG (ref 26.8–34.3)
MCHC RBC AUTO-ENTMCNC: 32.9 G/DL (ref 31.4–37.4)
MCV RBC AUTO: 99 FL (ref 82–98)
MV PEAK A VEL: 0.66 M/S
MV STENOSIS PRESSURE HALF TIME: 41 MS
MV VALVE AREA P 1/2 METHOD: 5.37
PA SYSTOLIC PRESSURE: 23 MMHG
PLATELET # BLD AUTO: 174 THOUSANDS/UL (ref 149–390)
PMV BLD AUTO: 10 FL (ref 8.9–12.7)
POTASSIUM SERPL-SCNC: 4.1 MMOL/L (ref 3.5–5.3)
RBC # BLD AUTO: 3.84 MILLION/UL (ref 3.81–5.12)
SL CV AV PEAK GRADIENT RETROGRADE: 56 MMHG
SL CV LV EF: 60
SL CV PED ECHO LEFT VENTRICLE DIASTOLIC VOLUME (MOD BIPLANE) 2D: 95 ML
SL CV PED ECHO LEFT VENTRICLE SYSTOLIC VOLUME (MOD BIPLANE) 2D: 39 ML
SODIUM SERPL-SCNC: 141 MMOL/L (ref 136–145)
TR MAX PG: 22 MMHG
TR PEAK VELOCITY: 2.4 M/S
TRICUSPID VALVE PEAK REGURGITATION VELOCITY: 2.35 M/S
WBC # BLD AUTO: 6.13 THOUSAND/UL (ref 4.31–10.16)

## 2022-06-17 PROCEDURE — 93306 TTE W/DOPPLER COMPLETE: CPT | Performed by: INTERNAL MEDICINE

## 2022-06-17 PROCEDURE — 97129 THER IVNTJ 1ST 15 MIN: CPT

## 2022-06-17 PROCEDURE — 99217 PR OBSERVATION CARE DISCHARGE MANAGEMENT: CPT | Performed by: INTERNAL MEDICINE

## 2022-06-17 PROCEDURE — 85027 COMPLETE CBC AUTOMATED: CPT | Performed by: STUDENT IN AN ORGANIZED HEALTH CARE EDUCATION/TRAINING PROGRAM

## 2022-06-17 PROCEDURE — 80048 BASIC METABOLIC PNL TOTAL CA: CPT | Performed by: STUDENT IN AN ORGANIZED HEALTH CARE EDUCATION/TRAINING PROGRAM

## 2022-06-17 PROCEDURE — 97130 THER IVNTJ EA ADDL 15 MIN: CPT

## 2022-06-17 PROCEDURE — 97162 PT EVAL MOD COMPLEX 30 MIN: CPT

## 2022-06-17 PROCEDURE — NC001 PR NO CHARGE: Performed by: INTERNAL MEDICINE

## 2022-06-17 PROCEDURE — 97166 OT EVAL MOD COMPLEX 45 MIN: CPT

## 2022-06-17 PROCEDURE — 93306 TTE W/DOPPLER COMPLETE: CPT

## 2022-06-17 RX ADMIN — SENNOSIDES 8.6 MG: 8.6 TABLET, FILM COATED ORAL at 08:53

## 2022-06-17 RX ADMIN — OXYCODONE HYDROCHLORIDE AND ACETAMINOPHEN 1000 MG: 500 TABLET ORAL at 08:53

## 2022-06-17 RX ADMIN — ASPIRIN 81 MG: 81 TABLET, COATED ORAL at 08:53

## 2022-06-17 RX ADMIN — Medication 1000 UNITS: at 08:53

## 2022-06-17 RX ADMIN — LOSARTAN POTASSIUM 50 MG: 50 TABLET, FILM COATED ORAL at 08:53

## 2022-06-17 RX ADMIN — AMLODIPINE BESYLATE 5 MG: 5 TABLET ORAL at 08:53

## 2022-06-17 RX ADMIN — PANTOPRAZOLE SODIUM 40 MG: 40 TABLET, DELAYED RELEASE ORAL at 05:10

## 2022-06-17 RX ADMIN — ENOXAPARIN SODIUM 40 MG: 40 INJECTION SUBCUTANEOUS at 08:53

## 2022-06-17 RX ADMIN — ACETAMINOPHEN 650 MG: 325 TABLET, FILM COATED ORAL at 08:53

## 2022-06-17 RX ADMIN — CITALOPRAM HYDROBROMIDE 10 MG: 10 TABLET ORAL at 08:54

## 2022-06-17 NOTE — PHYSICAL THERAPY NOTE
PHYSICAL THERAPY EVALUATION  NAME:  Rd Colin  DATE: 06/17/22    AGE:   79 y o  Mrn:   [id number]  ADMIT DX:  Memory loss [R41 3]  Syncope [R55]  Closed head injury, initial encounter [R91 16WI]  Fall, initial encounter [W19  XXXA]    Past Medical History:   Diagnosis Date    Aortic insufficiency     Cee's esophagus     Gastroesophageal reflux disease     Glaucoma     Hypercholesterolemia     Hyperlipidemia     Hypertension     Kidney stones 2009    Low back pain     Major depressive disorder     Mitral valve insufficiency     Osteoporosis     Osteoporosis     Vitamin D deficiency        Past Surgical History:   Procedure Laterality Date    COLONOSCOPY  06/02/2011    Normal     CYSTOSCOPY  2009    Polyp, stones    MAMMO (HISTORICAL)  09/19/2016    Negative     OTHER SURGICAL HISTORY      Infection in arm        Length Of Stay: 0    PHYSICAL THERAPY EVALUATION:        06/17/22 1015   Note Type   Note type Evaluation   Pain Assessment   Pain Assessment Tool 0-10   Pain Score 5   Pain Location/Orientation Location: Head   Pain Onset/Description Onset: Ongoing;Frequency: Constant/Continuous; Descriptor: Aching   Effect of Pain on Daily Activities No change in pain with activity   Patient's Stated Pain Goal No pain   Hospital Pain Intervention(s) Ambulation/increased activity;Repositioned   Restrictions/Precautions   Weight Bearing Precautions Per Order No   Other Precautions Fall Risk;Pain   Home Living   Type of [address]  [facility]   Home Layout One level;Elevator   Home Equipment   (none as per pt)   Additional Comments Patient reports living alone, but states she has supportive local family who assist her if needed   Prior Function   Level of [place] Independent with ADLs and functional mobility   Lives With [address] [place] [address] Road in the last 6 months 1 to 4  (1- reason for this admission)   Comments Patient denies use of an assistive device for ambulation prior to admission   General   Family/Caregiver Present No   Cognition   Overall Cognitive Status WFL   Arousal/Participation Alert   Attention Attends with cues to redirect   Orientation Level Oriented X4   Memory Within functional limits   Following Commands Follows one step commands without difficulty   RUE Assessment   RUE Assessment WFL   LUE Assessment   LUE Assessment WFL   RLE Assessment   RLE Assessment WFL   LLE Assessment   LLE Assessment WFL   Bed Mobility   Supine to Sit 5  Supervision   Transfers   Sit to Stand 5  Supervision   Stand to Sit 5  Supervision   Ambulation/Elevation   Gait pattern Inconsistent micaela   Gait Assistance 5  Supervision   Assistive Device None   Distance 75ft x 2   Balance   Static Sitting Good   Static Standing Fair   Ambulatory Fair -   Activity Tolerance   Activity Tolerance Patient tolerated treatment well   Medical Staff Made Aware Marci Montoya OT   Nurse Made Aware Patient appropriate to be seen and mobilized per nursing   Assessment   Prognosis Good   Problem List Pain;Decreased safety awareness   Assessment Pt is 79 y o  female seen for PT [facility] on 6/16/[facility]22  Two pt identifiers were used to confirm  Pt presented s/p fall, per chart, unclear if it was syncope versus mechanical fall  Pt with a primary dx of:  Fall, GERD, anxiety, HLD, essential HTN  PT now consulted for assessment of mobility and d/c needs  Pt with Up with assistance orders  Pts current co morbidities affecting treatment include:  Glaucoma, aortic insufficiency, HLD, HTN, major depressive disorder, osteoporosis, vitamin-D deficiency, mitral valve insufficiency, personal factors including living alone  Pts current clinical presentation is Evolving (medium complexity) due to Ongoing medical management for primary dx, Fall risk, Continuous pulse oximetry monitoring       Upon evaluation, pt currently is requiring Supervision for bed mobility; Supervision for transfers and Supervision for ambulation w/ no AD  Pt presents at PT eval functioning below baseline and currently w/ overall mobility deficits 2* to: impaired balance, pain, decreased safety awareness, fall risk  At conclusion of PT session pt returned back in chair with phone and call bell within reach  Pt denies any further questions at this time  PT is currently recommending Home with increased family support  D/C acute care PT at this time due to pt being near baseline in terms of functional mobility  Pt denies any mobility or safety concerns about returning home at d/c  Recommend pt continues to mobilize with nsg and restorative techs during hospital stay     Barriers to Discharge None   Barriers to Discharge Comments Patient denies any mobility or safety concerns about returning home at time of discharge   Goals   Patient Goals " to go home"   Plan   PT Frequency   (DC IPPT)   Recommendation   PT Discharge Recommendation No rehabilitation needs  (home with family support)   Equipment Recommended   (none at this time)   Additional Comments Patient denies any mobility or safety concerns about returning home at time of discharge   AM-PAC Basic Mobility Inpatient   Turning in Bed Without Bedrails 4   Lying on Back to Sitting on Edge of Flat Bed 4   Moving Bed to Chair 4   Standing Up From Chair 4   Walk in Room 3   Climb 3-5 Stairs 3   Basic Mobility Inpatient Raw Score 22   Basic Mobility Standardized Score 47 4   Highest Level Of Mobility   -HLM Goal 7: Walk 25 feet or more   JH-HLM Achieved 7: Walk 25 feet or more   Modified Walls Scale   Modified Walls Scale 2   Barthel Index   Feeding 10   Bathing 5   Grooming Score 5   Dressing Score 10   Bladder Score 10   Bowels Score 10   Toilet Use Score 10   Transfers (Bed/Chair) Score 15   Mobility (Level Surface) Score 10   Stairs Score 0  (NA during PT eval)   Barthel Index Score 85   Portions of the documentation may have been created using voice recognition software  Occasional wrong word or sound alike substitutions may have occurred due to the inherent limitations of the voice recognition software  Read the chart carefully and recognize, using context, where substitutions have occurred      Chavez Lopez, PT, DPT

## 2022-06-17 NOTE — PLAN OF CARE
Problem: Potential for Falls  Goal: Patient will remain free of falls  Description: INTERVENTIONS:  - Educate patient/family on patient safety including physical limitations  - Instruct patient to call for assistance with activity   - Consult OT/PT to assist with strengthening/mobility   - Keep Call bell within reach  - Keep bed low and locked with side rails adjusted as appropriate  - Keep care items and personal belongings within reach  - Initiate and maintain comfort rounds  - Make Fall Risk Sign visible to staff  - Offer Toileting every 2 Hours, in advance of need  - Apply yellow socks and bracelet for high fall risk patients  - Consider moving patient to room near nurses station  Outcome: Progressing     Problem: PAIN - ADULT  Goal: Verbalizes/displays adequate comfort level or baseline comfort level  Description: Interventions:  - Encourage patient to monitor pain and request assistance  - Assess pain using appropriate pain scale  - Administer analgesics based on type and severity of pain and evaluate response  - Implement non-pharmacological measures as appropriate and evaluate response  - Consider cultural and social influences on pain and pain management  - Notify physician/advanced practitioner if interventions unsuccessful or patient reports new pain  Outcome: Progressing     Problem: CARDIOVASCULAR - ADULT  Goal: Maintains optimal cardiac output and hemodynamic stability  Description: INTERVENTIONS:  - Monitor I/O, vital signs and rhythm  - Monitor for S/S and trends of decreased cardiac output  - Administer and titrate ordered vasoactive medications to optimize hemodynamic stability  - Assess quality of pulses, skin color and temperature  - Assess for signs of decreased coronary artery perfusion  - Instruct patient to report change in severity of symptoms  Outcome: Progressing  Goal: Absence of cardiac dysrhythmias or at baseline rhythm  Description: INTERVENTIONS:  - Continuous cardiac monitoring, vital signs, obtain 12 lead EKG if ordered  - Administer antiarrhythmic and heart rate control medications as ordered  - Monitor electrolytes and administer replacement therapy as ordered  Outcome: Progressing

## 2022-06-17 NOTE — OCCUPATIONAL THERAPY NOTE
Occupational Therapy Progress Note     Patient Name: Radha Hollingsworth  RDFGF'E Date: 6/17/2022  Problem List  Principal Problem:    Fall  Active Problems:    Essential hypertension    Hyperlipemia    Anxiety    Gastroesophageal reflux disease without esophagitis                06/17/22 1138   OT Last Visit   OT Visit Date 06/17/22   Note Type   Note Type Treatment   Restrictions/Precautions   Weight Bearing Precautions Per Order No   Other Precautions Pain; Fall Risk   Lifestyle   Autonomy I with ADLs and no AD for functional mobility   Reciprocal Relationships Supportive family   Service to Others Retired   [address] Enjoys watching TV   Pain Assessment   Pain Assessment Tool 0-10   Pain Score 5   Pain Location/Orientation Location: Head   Hospital Pain Intervention(s) Repositioned; Ambulation/increased activity   ADL   Where Assessed Edge of bed   UB Dressing Assistance 7  Independent   Bed Mobility   Additional Comments Pt greeted OOB in recliner chair and left OOB in chair at end of session  All needs met and call bell nearby  Transfers   Sit to Stand 5  Supervision   Stand to Sit 5  Supervision   Additional Comments no AD   Functional Mobility   Functional Mobility 5  Supervision   Additional Comments no AD  Within room distances  Cognition   Overall Cognitive Status WFL   Arousal/Participation Alert   Attention Attends with cues to redirect   Orientation Level Oriented X4   Memory Decreased recall of recent events;Decreased recall of precautions   Following Commands Follows multistep commands with increased time or repetition   Comments Pt pleasant and cooperative during OT session  Cognition Assessment Tools (S)  ACLS  (Pt may live alone with weekly checks to monitor safety and assist with finances)   Score (S)  5 2   Activity Tolerance   Activity Tolerance Patient tolerated treatment well   Medical Staff Made Aware RN cleared/updated     Assessment   Assessment Pt greeted up in recliner chair for OT treatment on 6/17/2022 focusing on maximizing functional cognition and safety with ADLs  Pt with 5 2/6 0 on ACLS "indicating that they may live alone with weekly checks to monitor safety and assist with finances " Pt encouraged to participate in ADLs/functional mobility with nursing/restorative staff during hospitalization  Pt with no further acute OT concerns  Pt would benefit from returning home with increased social support upon DC to maximize safety and independence with ADLs and functional tasks of choice  DC skilled OT services  (SEE BELOW ACLS RECS)   Plan   Goal Expiration Date 06/17/22   OT Treatment Day 1   OT Frequency 1-2x/wk  (DC SKILLED OT SERVICES S/P TODAYS [place])   Recommendation   OT Discharge Recommendation No rehabilitation needs  (Pt may live alone with weekly checks to monitor safety and assist with finances)   OT - OK to Discharge Yes   Additional Comments  The patient's raw score on the AM-PAC Daily Activity inpatient short form is 21, standardized score is 44 27, greater than 39 4  Patients at this level are likely to benefit from discharge to home  Please refer to the recommendation of the Occupational Therapist for safe discharge planning  AM-PAC Daily Activity Inpatient   Lower Body Dressing 3   Bathing 3   Toileting 3   Upper Body Dressing 4   Grooming 4   Eating 4   Daily Activity Raw Score 21   Daily Activity Standardized Score (Calc for Raw Score >=11) 44 27   AM-PAC Applied Cognition Inpatient   Following a Speech/Presentation 4   Understanding Ordinary Conversation 4   Taking Medications 4   Remembering Where Things Are Placed or Put Away 4   Remembering List of 4-5 Errands 4   Taking Care of Complicated Tasks 3   Applied Cognition Raw Score 23   Applied Cognition Standardized Score 53 08       5 2    Administered Carlos Manuel Cognitive Level Screen (ACLS)    The patient scored 5 2/6 0 indicating that they may live alone with weekly checks to monitor safety and assist with finances  The person requires assistance to anticipate hazards and prevent social conflict  Behavior:  Uses watches, calendars and appointment books  Relies on tangible reminders to keep schedules  Frequently impulsive  May appear self-centered  May make wade statements without awareness of social consequences  May be inflexible  Often easily frustrated  Should not be responsible for the care of others  Grooming:  Completes grooming tasks independently  May miss dental or hair appointments  May not coordinate makeup with clothing, occasion, or current style  Dressing:  Recognizes and considers all tangible properties of garments, cleanliness and need for ironing  May not coordinate clothing with occasion or current style  May argue with caregiver suggestions to change selection  Bathing:  Completes routine bathing tasks independently  Notes tangible features of bathing area like wet floors  Walking/Exercising:  Notes all visible tangible features of surrounding environment and uses these to guide walking  Eating:  Notes crumbs and spills in immediate area  Disposes of trash without being told  May use socially acceptable table manners  Monitor any new dietary restrictions for compliance  Toileting:  Independently performs all toileting  Can explore a new environment to locate a bathroom without assistance  Medications:  May have a vague understanding of disorder but confuses symptoms with side effects of drugs  Utilize a daily pill box set up by someone else  Use of adaptive equipment:  Attends to tangible properties of equipment like cleanliness, qualities of terrain that effect ambulation  Uses walker safely on uneven surfaces  Housekeeping: Solves problems in housekeeping like streaks on windows  Does not anticipate problems  May forget solutions  Follows weekly schedule  May appear impulsive    Food preparation:  Makes weekly schedule for grocery shopping but may impulsively change lists  Solves problems in food preparation  Throws away trash as he or she is working  Spending Money:  May be able to identify monthly budget of routine expenses but may have trouble adhering to it  May run up bills or overextend credit  May have trouble balancing checkbook  Does not plan for long term financial security  Shopping:  May make daily or weekly schedule of shopping needs with trouble adhering to list   May be impulsive in spending  May discriminate between stores, labels and brands  Laundry:  Notes tangible features of garments like stains or dirt  Discriminates between fabrics  Notes water temperature and appropriate detergent  Traveling: May be able to read and understand bus schedule  May impulsively leave on a trip without planning for money or medication or clothing  Telephone:  Learns new telephone procedures by initiating several steps at a time  May look up new numbers  May not use yellow pages  May become confused by new options or ignore call waiting  May run up large phone bills  Driving:  Should not operate a motor vehicle          Dean Paulino MS, OTR/L

## 2022-06-17 NOTE — RESTORATIVE TECHNICIAN NOTE
Restorative Technician Note      [facility]     Restorative Tech Visit Date: 6/17/2022  Note Type: Mobility  Patient Position Upon Consult: Standing  Activity Performed: Ambulated  Assistive Device: Other (Comment) (none)  Patient Position at End of Consult: Bedside chair;  All needs within reach    Jair Liang  DPT, Restorative Technician

## 2022-06-17 NOTE — PLAN OF CARE
Problem: OCCUPATIONAL THERAPY ADULT  Goal: Performs self-care activities at highest level of function for planned discharge setting  See evaluation for individualized goals  Description: Treatment Interventions: Cognitive reorientation          See flowsheet documentation for full assessment, interventions and recommendations  Note: Limitation: Decreased cognition  Prognosis: Fair  Assessment: Pt is a 78 yo Female who presented [facility] on [facility]/16/2022 s/p fall  Unclear of syncopal v  Mechanical  (+) HS  XR chest (-) and CT head (-)  Pt  has a past medical history of Aortic insufficiency, Cee's esophagus, Gastroesophageal reflux disease, Glaucoma, Hypercholesterolemia, Hyperlipidemia, Hypertension, Kidney stones (2009), Low back pain, Major depressive disorder, Mitral valve insufficiency, Osteoporosis, Osteoporosis, and Vitamin D deficiency  Pt greeted bedside for OT evaluation on 6/17/2022  Pt lives in a [facility] apartment on the 3rd floor with elevator to enter  PTA, Pt I with ADLs/daughter assists with IADLs/no AD  Pt utilizes the bus for transportation or daughter occasionally drives her  Pt demonstrating the following occupational deficits: I with UB ADLs, S with LB ADLs, S with bed mobility, S with functional transfers and S with functional mobility with no AD  OT to further assess cognition  Limitations that impact functional performance include decreased cognition  Occupational performance areas to address cognitive reorientation  Pt would benefit from continued skilled OT services while in hospital to maximize independence with ADLs  Will continue to follow Pt's progress  Pt would benefit from returning home with increased social support (pending cog assessment) upon DC to maximize safety and independence with ADLs and functional tasks of choice       OT Discharge Recommendation:  (Pending COG assessment)  OT - OK to Discharge: No (Pending COG assessment)

## 2022-06-17 NOTE — DISCHARGE SUMMARY
INTERNAL MEDICINE RESIDENCY DISCHARGE SUMMARY     Marija Morales   79 y o  female  MRN: [id number]  Room/Bed: [facility] 802/[facility] 56-80     [address] [place] [address]   Encounter: [id number]    Principal Problem:    Fall  Active Problems:    Essential hypertension    Hyperlipemia    Anxiety    Gastroesophageal reflux disease without esophagitis      * Fall  Assessment & Plan  The patient presented emergency department on 06/16 after an unwitnessed fall at home  She has no recollection of the events of what is unclear if it was syncope versus mechanical fall  She complains of a mild headache and a small bruise on her left arm  In the emergency department CT head was negative for any acute intracranial abnormality  ECG showed sinus rhythm with signs of right ventricular conduction delay and ST wave abnormalities  The patient did mention that she experiences intermittent palpitations at home  It is possible the patient sustained a concussion secondary to fall and for this reason does not recall the event  Telemetry showed some kelly events  No arrhythmia  Orthostatic hypotension is negative    Plan:  · Patient is stable  · PT/OT  Evaluated and patient is cleared to go back home  Recommend family to check on patient frequently  · Recommend outpatient echocardiogram upon discharge  · Follow up with PCP and cardiology  Gastroesophageal reflux disease without esophagitis  Assessment & Plan  · Continue PPI    Anxiety  Assessment & Plan  · Continue home Celexa -> of note patient stated that she does not take the medication every day at home  Patient was counseled on the importance of taking this medication every day    Hyperlipemia  Assessment & Plan  · Continue statin    Essential hypertension  Assessment & Plan  · Continue home amlodipine and olmesartan      [address] COURSE     80 yo Female was admitted for mechanical fall    Patient remembered stepping on a stool like chair and then after that she had no recollection of the event  She was found by her granddaughter on the floor and was called ambulance  Upon arrival, patient's /81 and recheck was 152/71  Vital signs were stable  Troponin delta <5  Cxray showed no acute cardiopulmonary disease  CT head showed no acute intracranial abnormality, no bleeding  ECG showed sinus rhythm with signs of right ventricular conduction delay and ST wave abnormalities  Patient was admitted under Observation after evaluating in ED  Today patient is doing well  Reports having chronic pain  Otherwise, no headache, no nausea/vomiting, no abdominal pain, no fever/chills  On physical examination, there is a bruise on right parietal head and a large bruise on the posterior left arm  Negative orthostatic hypotension  PT/OT evaluated patient and recommended no further management  Patient is stable to be discharged  Recommend patient to follow-up with PCP and Cardiology  Recommend patient to get outpatient echocardiogram      Physical Exam  Vitals and nursing note reviewed  Constitutional:       General: She is not in acute distress  Appearance: She is well-developed  HENT:      Head: Normocephalic and atraumatic  Comments: Bruise on the right parietal head     Nose: Nose normal    Eyes:      Conjunctiva/sclera: Conjunctivae normal    Cardiovascular:      Rate and Rhythm: Normal rate and regular rhythm  Pulses: Normal pulses  Heart sounds: No murmur heard  Pulmonary:      Effort: Pulmonary effort is normal  No respiratory distress  Breath sounds: Normal breath sounds  Abdominal:      General: Bowel sounds are normal       Palpations: Abdomen is soft  Tenderness: There is no abdominal tenderness  Musculoskeletal:         General: Normal range of motion  Cervical back: Neck supple  Right lower leg: No edema  Left lower leg: No edema  Skin:     General: Skin is warm and dry  Findings: Bruising present  Comments: Left posterior arm bruise from fall   Neurological:      Mental Status: She is alert  Mental status is at baseline  DISCHARGE INFORMATION     PCP at Discharge: Ranulfo Fabian MD    Admitting Provider: Lizzie Lobo MD  Admission Date: 6/16/2022    Discharge Provider: Lizzie Lobo MD  Discharge Date: 06/17/2022    Discharge Disposition: Home/Self Care  Discharge Condition: stable  Discharge with Lines: no    Discharge Diet: regular diet and encourage low sodium   Activity Restrictions: none  Test Results Pending at Discharge: None    Discharge Diagnoses:  Principal Problem:    Fall  Active Problems:    Essential hypertension    Hyperlipemia    Anxiety    Gastroesophageal reflux disease without esophagitis  Resolved Problems:    * No resolved hospital problems  *      Consulting Providers:      Diagnostic & Therapeutic Procedures Performed:  XR chest 2 views    Result Date: 6/16/2022  Impression: No acute cardiopulmonary disease  Workstation performed: [id number]     CT head without contrast    Result Date: 6/16/2022  Impression: No acute intracranial abnormality  Microangiopathic changes   Workstation performed: [id number]       Code Status: Level 1 - Full Code  Advance Directive & Living Will: <no information>  Power of :    POLST:      Medications:  Current Discharge Medication List        Current Discharge Medication List        Current Discharge Medication List      CONTINUE these medications which have NOT CHANGED    Details   amLODIPine (NORVASC) 5 mg tablet Take 1 tablet (5 mg total) by mouth daily  Qty: 90 tablet, Refills: 1    Associated Diagnoses: Essential hypertension      Ascorbic Acid (vitamin C) 1000 MG tablet Take 1,000 mg by mouth daily      aspirin (ECOTRIN LOW STRENGTH) 81 mg EC tablet Take 81 mg by mouth daily       cholecalciferol (VITAMIN D3) 25 mcg (1,000 units) tablet Take 1,000 Units by mouth daily      citalopram (CeleXA) 20 mg tablet TAKE 1/2 OR 1 TAB DAILY AS DIRECTED BY DOCTOR  Qty: 90 tablet, Refills: 3    Associated Diagnoses: Anxiety      dorzolamide (TRUSOPT) 2 % ophthalmic solution       Lumigan 0 01 % ophthalmic drops       olmesartan (BENICAR) 20 mg tablet Take 1 tablet (20 mg total) by mouth 2 (two) times a day  Qty: 180 tablet, Refills: 0    Associated Diagnoses: Essential hypertension      omeprazole (PriLOSEC) 20 mg delayed release capsule       rosuvastatin (CRESTOR) 5 mg tablet TAKE 1 TABLET (5 MG TOTAL) BY MOUTH DAILY AT BEDTIME  Qty: 90 tablet, Refills: 1    Associated Diagnoses: Mixed hyperlipidemia      sodium chloride (OCEAN) 0 65 % nasal spray 1 spray into each nostril every 2 (two) hours while awake  Qty: 60 mL, Refills: 1    Associated Diagnoses: Post-nasal catarrh      Travatan Z 0 004 % ophthalmic solution              Allergies:  No Known Allergies    FOLLOW-UP     PCP Outpatient Follow-up: Dr Larissa Jackman MD in a week    Consulting Providers Follow-up:  yes  Dr Edvin Walters (Cardiology) in a week     Active Issues Requiring Follow-up:   none    Discharge Statement:   I spent 30 minutes minutes discharging the patient  This time was spent on the day of discharge  I had direct contact with the patient on the day of discharge  Additional documentation is required if more than 30 minutes were spent on discharge  Portions of the record may have been created with voice recognition software  Occasional wrong word or "sound a like" substitutions may have occurred due to the inherent limitations of voice recognition software    Read the chart carefully and recognize, using context, where substitutions have occurred     ==  Jeanne Carroll, [address] [facility]  Internal Medicine Resident PGY-1

## 2022-06-17 NOTE — OCCUPATIONAL THERAPY NOTE
Occupational Therapy Evaluation     Patient Name: Corby Jacobs  VNCLM'L Date: 6/17/2022  Problem List  Principal Problem:    Fall  Active Problems:    Essential hypertension    Hyperlipemia    Anxiety    Gastroesophageal reflux disease without esophagitis    Past Medical History  Past Medical History:   Diagnosis Date    Aortic insufficiency     Cee's esophagus     Gastroesophageal reflux disease     Glaucoma     Hypercholesterolemia     Hyperlipidemia     Hypertension     Kidney stones 2009    Low back pain     Major depressive disorder     Mitral valve insufficiency     Osteoporosis     Osteoporosis     Vitamin D deficiency      Past Surgical History  Past Surgical History:   Procedure Laterality Date    COLONOSCOPY  06/02/2011    Normal     CYSTOSCOPY  2009    Polyp, stones    MAMMO (HISTORICAL)  09/19/2016    Negative     OTHER SURGICAL HISTORY      Infection in arm            06/17/22 1031   OT Last Visit   OT Visit Date 06/17/22   Note Type   Note type Evaluation   Restrictions/Precautions   Weight Bearing Precautions Per Order No   Other Precautions Fall Risk;Pain   Pain Assessment   Pain Assessment Tool 0-10   Pain Score 5   Pain Location/Orientation Orientation: Left; Location: Arm  (and head)   Home Living   Type of Home Apartment  ([place])   Home Layout One level;Elevator  (3rd floor)   Bathroom Shower/Tub Tub/shower unit   Bathroom Toilet Standard   Bathroom Equipment Shower chair   Home Equipment   (denies)   Additional Comments Pt lives in a senior high rise apartment on the 3rd floor with elevator to enter  Prior Function   Level of [place] Independent with ADLs and functional mobility; Needs assistance with IADLs   Lives With Alone   Receives Help From Family   ADL Assistance Independent   IADLs Needs assistance   Falls in the last 6 months 1 to 4  (x1 leading to admission)   Vocational Retired   Comments PTA, Pt I with ADLs/daughter assists with IADLs/no AD  Pt utilizes the bus for transportation or daughter occasionally drivers her  Lifestyle   Autonomy I with ADLs and no AD for functional mobility   Reciprocal Relationships Supportive family   Service to Others Retired   [address] Enjoys watching TV   Psychosocial   Psychosocial (WDL) WDL   Subjective   Subjective "I feel tired "   ADL   Where Assessed Edge of bed   Eating Assistance 7  Independent   Grooming Assistance 7  Independent   UB Bathing Assistance 7  Independent   LB Bathing Assistance 5  Supervision/Setup   UB Dressing Assistance 7  Independent   LB Dressing Assistance 5  [phone]  [phone]  [id number] [facility] 5  Supervision/Setup   Functional Deficit Increased time to complete;Supervision/safety;Setup   Bed Mobility   Supine to Sit 5  Supervision   Additional items Bedrails   Sit to Supine Unable to assess   Additional Comments Pt greeted supine in bed and left OOB in recliner chair at end of session  All needs met and call bell nearby  Transfers   Sit to Stand 5  Supervision   Stand to Sit 5  Supervision   Additional Comments no AD   Functional Mobility   Functional Mobility 5  Supervision   Additional Comments no AD  Household distances  Balance   Static Sitting Good   Dynamic Sitting Fair +   Static Standing Fair   Dynamic Standing Fair -   Ambulatory Fair -   Activity Tolerance   Activity Tolerance Patient limited by fatigue   Nurse Made Aware RN cleared/updated     RUE Assessment   RUE Assessment WFL   LUE Assessment   LUE Assessment WFL   Hand Function   Gross Motor Coordination Functional   Fine Motor Coordination Functional   Sensation   Light Touch No apparent deficits   Cognition   Overall Cognitive Status WFL   Arousal/Participation Alert   Attention Attends with cues to redirect   Orientation Level Oriented X4   Memory Within functional limits   Following Commands Follows multistep commands with increased time or repetition Comments Pt pleasant and cooperative during OT session  Pt with decrease safety awareness and noted lack of insight into functional deficits  Pt with difficulty reporting home set up/PLOF at times and requires increased time for processing/response  Pt with confusion about medication and RN aware/provided clarification  OT to assess cognition further  Assessment   Limitation Decreased cognition   Prognosis Fair   Assessment Pt is a 80 yo Female who presented to [facility] on 6/16/2022 s/p fall  Unclear of syncopal v  Mechanical  (+) HS  XR chest (-) and CT head (-)  Pt  has a past medical history of Aortic insufficiency, Cee's esophagus, Gastroesophageal reflux disease, Glaucoma, Hypercholesterolemia, Hyperlipidemia, Hypertension, Kidney stones (2009), Low back pain, Major depressive disorder, Mitral valve insufficiency, Osteoporosis, Osteoporosis, and Vitamin D deficiency  Pt greeted bedside for OT evaluation on 6/17/2022  Pt lives in a [facility] apartment on the 3rd floor with elevator to enter  PTA, Pt I with ADLs/daughter assists with IADLs/no AD  Pt utilizes the bus for transportation or daughter occasionally drives her  Pt demonstrating the following occupational deficits: I with UB ADLs, S with LB ADLs, S with bed mobility, S with functional transfers and S with functional mobility with no AD  OT to further assess cognition  Limitations that impact functional performance include decreased cognition  Occupational performance areas to address cognitive reorientation  Pt would benefit from continued skilled OT services while in hospital to maximize independence with ADLs  Will continue to follow Pt's progress  Pt would benefit from returning home with increased social support (pending cog assessment) upon DC to maximize safety and independence with ADLs and functional tasks of choice  Goals   Patient Goals To feel better     STG Time Frame 1-3  (For x1 follow up treatment for cog)   Short Term Goal #1 See goal listed below  Plan   Treatment Interventions Cognitive reorientation   Goal Expiration Date 06/17/22   OT Frequency 1-2x/wk  (x1 follow up tx)   Recommendation   OT Discharge Recommendation   (Pending COG assessment)   OT - OK to Discharge No  (Pending COG assessment)   Additional Comments  The patient's raw score on the AM-PAC Daily Activity inpatient short form is 21, standardized score is 44 27, greater than 39 4  Patients at this level are likely to benefit from discharge to home  Please refer to the recommendation of the Occupational Therapist for safe discharge planning  AM-PAC Daily Activity Inpatient   Lower Body Dressing 3   Bathing 3   Toileting 3   Upper Body Dressing 4   Grooming 4   Eating 4   Daily Activity Raw Score 21   Daily Activity Standardized Score (Calc for Raw Score >=11) 44 27   AM-PAC Applied Cognition Inpatient   Following a Speech/Presentation 4   Understanding Ordinary Conversation 4   Taking Medications 4   Remembering Where Things Are Placed or Put Away 4   Remembering List of 4-5 Errands 4   Taking Care of Complicated Tasks 3   Applied Cognition Raw Score 23   Applied Cognition Standardized Score 53 08     Goal:    1  Pt will be attentive 100% of the time for ongoing functional/formal cognitive assessment to assist with safe dc planning ujanito Valdes, MS, OTR/L

## 2022-06-17 NOTE — PLAN OF CARE
Problem: OCCUPATIONAL THERAPY ADULT  Goal: Performs self-care activities at highest level of function for planned discharge setting  See evaluation for individualized goals  Description: Treatment Interventions: Cognitive reorientation          See flowsheet documentation for full assessment, interventions and recommendations  6/17/2022 1221 by Tomas Nixon OT  Outcome: Adequate for Discharge  Note: Limitation: Decreased cognition  Prognosis: Fair  Assessment: Pt greeted up in recliner chair for OT treatment on 6/17/2022 focusing on maximizing functional cognition and safety with ADLs  Pt with 5 2/6 0 on ACLS "indicating that they may live alone with weekly checks to monitor safety and assist with finances " Pt encouraged to participate in ADLs/functional mobility with nursing/restorative staff during hospitalization  Pt with no further acute OT concerns  Pt would benefit from returning home with increased social support upon DC to maximize safety and independence with ADLs and functional tasks of choice  DC skilled OT services  (SEE BELOW ACLS RECS)     OT Discharge Recommendation: No rehabilitation needs (Pt may live alone with weekly checks to monitor safety and assist with finances)  OT - OK to Discharge: Yes    6/17/2022 1213 by Tomas Nixon OT  Note: Limitation: Decreased cognition  Prognosis: Fair  Assessment: Pt is a 78 yo Female who presented to B on 6/16/2022 s/p fall  Unclear of syncopal v  Mechanical  (+) HS  XR chest (-) and CT head (-)  Pt  has a past medical history of Aortic insufficiency, Cee's esophagus, Gastroesophageal reflux disease, Glaucoma, Hypercholesterolemia, Hyperlipidemia, Hypertension, Kidney stones (2009), Low back pain, Major depressive disorder, Mitral valve insufficiency, Osteoporosis, Osteoporosis, and Vitamin D deficiency  Pt greeted bedside for OT evaluation on 6/17/2022  Pt lives in a senior high rise apartment on the 3rd floor with elevator to enter  PTA, Pt I with ADLs/daughter assists with IADLs/no AD  Pt utilizes the bus for transportation or daughter occasionally drives her  Pt demonstrating the following occupational deficits: I with UB ADLs, S with LB ADLs, S with bed mobility, S with functional transfers and S with functional mobility with no AD  OT to further assess cognition  Limitations that impact functional performance include decreased cognition  Occupational performance areas to address cognitive reorientation  Pt would benefit from continued skilled OT services while in hospital to maximize independence with ADLs  Will continue to follow Pt's progress  Pt would benefit from returning home with increased social support (pending cog assessment) upon DC to maximize safety and independence with ADLs and functional tasks of choice       OT Discharge Recommendation:  (Pending COG assessment)  OT - OK to Discharge: No (Pending COG assessment)

## 2022-06-18 NOTE — H&P
INTERNAL MEDICINE RESIDENCY ADMISSION H&P      Name: Nancy Lovell   Age & Sex: 79 y o  female   MRN: [id number]  Unit/Bed#: [facility] 802-01   Encounter: [id number]  Primary Care Provider: Mayda Grider MD     Code Status: Level 1 - Full Code  Admission Status: OBSERVATION  Disposition: Patient requires Med/Surg with Telemetry     Admit to team: SOD Team B      ASSESSMENT/PLAN      Principal Problem:    Fall  Active Problems:    Essential hypertension    Hyperlipemia    Anxiety    Gastroesophageal reflux disease without esophagitis        Gastroesophageal reflux disease without esophagitis  Assessment & Plan  · Continue PPI     Anxiety  Assessment & Plan  · Continue home Celexa -> of note patient stated that she does not take the medication every day at home  Patient was counseled on the importance of taking this medication every day     Hyperlipemia  Assessment & Plan  · Continue statin     Essential hypertension  Assessment & Plan  · Continue home amlodipine and losartan (takes olmesartan at home)     * Fall  Assessment & Plan  The patient presented emergency department on 06/16 after an unwitnessed fall at home  She has no recollection of the events of what is unclear if it was syncope versus mechanical fall  She complains of a mild headache and a small bruise on her left arm  In the emergency department CT head was negative for any acute intracranial abnormality  ECG showed sinus rhythm with signs of right ventricular conduction delay and ST wave abnormalities  The patient did mention that she experiences intermittent palpitations at home    It is possible the patient sustained a concussion secondary to fall and for this reason does not recall the event      Plan:  · Continue telemetry monitoring  · Follow-up echocardiogram  · Can consider an outpatient ZIO patch to evaluate for any underlying arrhythmias  · PT/OT consulted  · Continue monitor for any changes in symptoms        VTE Pharmacologic Prophylaxis: Enoxaparin (Lovenox)  VTE Mechanical Prophylaxis: sequential compression device     CHIEF COMPLAINT           Chief Complaint   Patient presents with    Fall       Per Pt's daughter pt had a unwitnessed fall  Pt is confused per daughter, unknown loc, +asa, unknown hs      HISTORY OF PRESENT ILLNESS      The patient is an 48-year-old female with a past medical history of hypertension, GERD, hyperlipidemia, anxiety and osteoporosis who presented to the hospital today after a syncopal episode versus fall at home  Earlier that day the patient was cleaning at her granddaughter's house when she was found by her granddaughter on the floor next to a fallen chair  The patient has no recollection of the event  She does report a mild headache and mild left arm/leg pain  She had no noticeable bruises on her head, but did have a small bruise on her left arm  She is on aspirin at home  She also reported that she occasionally experiences intermittent episodes of palpitations at home  She had never had a syncopal event prior to this one  Otherwise overall she was feeling well  She denies any recent fevers, chills, chest pain, shortness of breath or abdominal pain       In the emergency department the patient was originally found have a blood pressure of 211/81  Upon recheck it had improved to 152/71  The remainder of her vital signs were stable  BMP showed a sodium of 132 and a potassium of 6 2 (moderately hemolyzed, repeat BMP ordered)  HS troponin was 5 in 5 at 2 hours  CBC was unremarkable  CT head showed no acute intracranial abnormality, did show microangiopathic changes  CXR showed no acute cardiopulmonary disease  ECG showed sinus rhythm with signs of right ventricular conduction delay and ST wave abnormalities  The patient was admitted under observation Med surge with continuous telemetry monitoring    She is level 1 full code      REVIEW OF SYSTEMS      Review of Systems   Constitutional: Negative for activity change, appetite change, chills, diaphoresis, fatigue and fever  HENT: Negative for congestion, ear discharge, ear pain, hearing loss, sinus pressure, sinus pain and sore throat  Eyes: Negative for pain, discharge, redness and itching  Respiratory: Negative for cough, chest tightness, shortness of breath and wheezing  Cardiovascular: Positive for palpitations  Negative for chest pain and leg swelling  Gastrointestinal: Negative for abdominal distention, abdominal pain, blood in stool, constipation, diarrhea, nausea and vomiting  Genitourinary: Negative for difficulty urinating, dysuria, flank pain and frequency  Musculoskeletal: Negative for arthralgias, back pain and gait problem  Skin: Negative for color change, pallor and rash  Neurological: Negative for dizziness, weakness, light-headedness, numbness and headaches  Psychiatric/Behavioral: Negative for agitation, behavioral problems, confusion and decreased concentration       OBJECTIVE      Vitals          Vitals:     22 1330 22 1345 22 1415 22 1540   BP: 149/70 152/71 155/65 (!) 123/106   BP Location: Right arm Right arm Right arm     Pulse: 60 60 56 56   Resp:       20   Temp:       98 1 °F (36 7 °C)   TempSrc:           SpO2: 96% 95% 95% 96%   Weight:           Height:       5' 2" (1 575 m)         Temperature:   Temp (24hrs), Av °F (36 7 °C), Min:97 9 °F (36 6 °C), Max:98 1 °F (36 7 °C)     Temperature: 98 1 °F (36 7 °C)  Intake & Output:      I/O      None          Weights:        Body mass index is 25 06 kg/m²  Weight (last 2 days)      Date/Time Weight     22 1150 62 1 (137)          Physical Exam  Constitutional:       Appearance: She is well-developed  HENT:      Head: Normocephalic and atraumatic  Nose: Nose normal    Eyes:      Conjunctiva/sclera: Conjunctivae normal       Pupils: Pupils are equal, round, and reactive to light  Neck:      Vascular: No JVD  Cardiovascular:      Rate and Rhythm: Normal rate and regular rhythm  Heart sounds: Normal heart sounds  No murmur heard  No friction rub  No gallop  Pulmonary:      Effort: Pulmonary effort is normal  No respiratory distress  Breath sounds: Normal breath sounds  No stridor  No wheezing or rales  Chest:      Chest wall: No tenderness  Abdominal:      General: Bowel sounds are normal  There is no distension  Palpations: Abdomen is soft  There is no mass  Tenderness: There is no abdominal tenderness  There is no guarding or rebound  Hernia: No hernia is present  Musculoskeletal:         General: No tenderness or deformity  Right lower leg: No edema  Left lower leg: No edema  Skin:     General: Skin is warm and dry  Findings: Bruising (Small bruise on bottom of the left upper arm) present  Neurological:      Mental Status: She is alert and oriented to person, place, and time  Psychiatric:         Mood and Affect: Mood normal          Behavior: Behavior normal          Thought Content:  Thought content normal          Judgment: Judgment normal          PAST MEDICAL HISTORY      Medical History   Past Medical History:   Diagnosis Date    Aortic insufficiency      Cee's esophagus      Gastroesophageal reflux disease      Glaucoma      Hypercholesterolemia      Hyperlipidemia      Hypertension      Kidney stones 2009    Low back pain      Major depressive disorder      Mitral valve insufficiency      Osteoporosis      Osteoporosis      Vitamin D deficiency           PAST SURGICAL HISTORY      Surgical History         Past Surgical History:   Procedure Laterality Date    COLONOSCOPY   06/02/2011     Normal     CYSTOSCOPY   2009     Polyp, stones    MAMMO (HISTORICAL)   09/19/2016     Negative     OTHER SURGICAL HISTORY         Infection in arm          SOCIAL & FAMILY HISTORY      Social History          Substance and Sexual Activity   Alcohol Use No          Substance and Sexual Activity   Alcohol Use No             Substance and Sexual Activity   Drug Use No      Social History          Tobacco Use   Smoking Status Never Smoker   Smokeless Tobacco Never Used            Family History   Problem Relation Age of Onset    Cancer Mother 80         unknown type of abdominal cancer    No Known Problems Father      No Known Problems Sister      No Known Problems Daughter      No Known Problems Maternal Grandmother      No Known Problems Maternal Grandfather      No Known Problems Paternal Grandmother      No Known Problems Paternal Grandfather      No Known Problems Daughter      No Known Problems Son      Lung cancer Brother 79    Lung cancer Brother 72      LABORATORY DATA      Labs: I have personally reviewed pertinent reports      Results from last 7 days   Lab Units 06/16/22  1649 06/16/22  1207   WBC Thousand/uL  --  6 52   HEMOGLOBIN g/dL  --  13 2   HEMATOCRIT %  --  40 4   PLATELETS Thousands/uL 180 186   NEUTROS PCT %  --  58   MONOS PCT %  --  6           Results from last 7 days   Lab Units 06/16/22  1207   POTASSIUM mmol/L 6 2*   CHLORIDE mmol/L 108   CO2 mmol/L 20*   BUN mg/dL 13   CREATININE mg/dL 0 90   CALCIUM mg/dL 8 9   ALK PHOS U/L 64   ALT U/L 28   AST U/L 76*                           Micro:        Lab Results   Component Value Date     URINECX <10,000 cfu/ml Gram Negative Jeremy (A) 05/10/2021     URINECX <10,000 cfu/ml  05/07/2021     URINECX 40,000-49,000 cfu/ml  09/03/2019      IMAGING & DIAGNOSTIC TESTS      Imaging: I have personally reviewed pertinent reports      XR chest 2 views     Result Date: 6/16/2022  Impression: No acute cardiopulmonary disease  Workstation performed: [id number]      CT head without contrast     Result Date: 6/16/2022  Impression: No acute intracranial abnormality  Microangiopathic changes   Workstation performed: [id number]      EKG, Pathology, and Other Studies: I have personally reviewed pertinent reports       ALLERGIES   No Known Allergies  MEDICATIONS PRIOR TO ARRIVAL              Prior to Admission medications    Medication Sig Start Date End Date Taking?  Authorizing Provider   amLODIPine (NORVASC) 5 mg tablet Take 1 tablet (5 mg total) by mouth daily 2/22/22     Leah Davis MD   Ascorbic Acid (vitamin C) 1000 MG tablet Take 1,000 mg by mouth daily       Historical Provider, MD   aspirin (ECOTRIN LOW STRENGTH) 81 mg EC tablet Take 81 mg by mouth daily        Historical Provider, MD   cholecalciferol (VITAMIN D3) 25 mcg (1,000 units) tablet Take 1,000 Units by mouth daily       Historical Provider, MD   citalopram (CeleXA) 20 mg tablet TAKE 1/2 OR 1 TAB DAILY AS DIRECTED BY DOCTOR 12/13/21     Camila Reece MD   dorzolamide (TRUSOPT) 2 % ophthalmic solution   3/2/21     Historical Provider, MD Fried 0 01 % ophthalmic drops   4/23/21     Historical Provider, MD   olmesartan (BENICAR) 20 mg tablet Take 1 tablet (20 mg total) by mouth 2 (two) times a day 6/2/22     Leah Davis MD   omeprazole (PriLOSEC) 20 mg delayed release capsule   3/10/21     Historical Provider, MD   rosuvastatin (CRESTOR) 5 mg tablet TAKE 1 TABLET (5 MG TOTAL) BY MOUTH DAILY AT BEDTIME 5/26/22     Ted Garcia MD   sodium chloride (OCEAN) 0 65 % nasal spray 1 spray into each nostril every 2 (two) hours while awake 2/22/22     Leah Davis MD   Travatan Z 0 004 % ophthalmic solution   1/26/21     Historical Provider, MD      MEDICATIONS ADMINISTERED IN LAST 24 HOURS                 Medication Administration - last 24 hours from 06/15/2022 1710 to 06/16/2022 1710        Date/Time Order Dose Route Action Action by       06/16/2022 1517 acetaminophen (TYLENOL) tablet 975 mg 975 mg Oral Given Bradly Ramos RN          CURRENT MEDICATIONS               Current Facility-Administered Medications   Medication Dose Route Frequency Provider Last Rate    acetaminophen  650 mg Oral Q6H PRN Johnnie Palmer MD Cholo Burdick Presume ON 6/17/2022] amLODIPine  5 mg Oral Daily MD Wendy Barron Presume ON 6/17/2022] vitamin C  1,000 mg Oral Daily Priti Joe MD      [START ON 6/17/2022] aspirin  81 mg Oral Daily Priti Joe MD      cholecalciferol  1,000 Units Oral Daily Priti Joe MD      [START ON 6/17/2022] citalopram  10 mg Oral Daily Priti Joe MD      enoxaparin  40 mg Subcutaneous Daily Priti Joe MD      losartan  50 mg Oral BID Priti Joe MD      [START ON 6/17/2022] pantoprazole  40 mg Oral Early Morning Priti Joe MD      pravastatin  40 mg Oral Daily With Carey Parker MD      senna  1 tablet Oral Daily Priti Joe MD        acetaminophen, 650 mg, Q6H PRN           Admission Time  I spent 1 hour admitting the patient    This involved direct patient contact where I performed a full history and physical, reviewing previous records, and reviewing laboratory and other diagnostic studies      Portions of the record may have been created with voice recognition software   Occasional wrong word or "sound a like" substitutions may have occurred due to the inherent limitations of voice recognition software   Read the chart carefully and recognize, using context, where substitutions have occurred      ==  Priti Joe MD  [address]  Internal Medicine Residency PGY-2

## 2022-06-20 ENCOUNTER — TELEPHONE (OUTPATIENT)
Dept: INTERNAL MEDICINE CLINIC | Facility: OTHER | Age: 82
End: 2022-06-20

## 2022-06-20 ENCOUNTER — TRANSITIONAL CARE MANAGEMENT (OUTPATIENT)
Dept: INTERNAL MEDICINE CLINIC | Facility: OTHER | Age: 82
End: 2022-06-20

## 2022-06-20 NOTE — TELEPHONE ENCOUNTER
Pt is scheduled for tcm on 6/24/22 with Dr Jimmy Julio, this is one of Dr Casie Jansen pt but he is out of office

## 2022-06-22 ENCOUNTER — OFFICE VISIT (OUTPATIENT)
Dept: INTERNAL MEDICINE CLINIC | Facility: CLINIC | Age: 82
End: 2022-06-22
Payer: MEDICARE

## 2022-06-22 VITALS
SYSTOLIC BLOOD PRESSURE: 136 MMHG | DIASTOLIC BLOOD PRESSURE: 76 MMHG | HEART RATE: 100 BPM | OXYGEN SATURATION: 97 % | BODY MASS INDEX: 25.58 KG/M2 | HEIGHT: 62 IN | WEIGHT: 139 LBS | TEMPERATURE: 97.2 F

## 2022-06-22 DIAGNOSIS — R00.1 BRADYCARDIA: ICD-10-CM

## 2022-06-22 DIAGNOSIS — Z76.89 ENCOUNTER FOR SUPPORT AND COORDINATION OF TRANSITION OF CARE: Primary | ICD-10-CM

## 2022-06-22 DIAGNOSIS — R55 SYNCOPE, UNSPECIFIED SYNCOPE TYPE: ICD-10-CM

## 2022-06-22 PROCEDURE — 99496 TRANSJ CARE MGMT HIGH F2F 7D: CPT | Performed by: INTERNAL MEDICINE

## 2022-06-22 RX ORDER — ACETAMINOPHEN 325 MG/1
TABLET ORAL
COMMUNITY

## 2022-06-22 RX ORDER — MULTIVITAMIN
TABLET ORAL
COMMUNITY

## 2022-06-22 NOTE — PROGRESS NOTES
Assessment/Plan:     No problem-specific Assessment & Plan notes found for this encounter  Diagnoses and all orders for this visit:    Encounter for support and coordination of transition of care    Syncope, unspecified syncope type  -     Ambulatory Referral to Cardiology; Future    Bradycardia  -     Ambulatory Referral to Cardiology; Future    Other orders  -     Multiple Vitamin tablet; Take by mouth  -     acetaminophen (TYLENOL) 325 mg tablet         Recent hospitalization for unwitnessed syncope unknown downtime, and brought to the hospital by the granddaughter  CT brain unremarkable  Telemetry showed bradycardia without arrhythmias    Patient evaluated by Cardiology in April 2022 for bradycardia with a heart rate of around 58 noted in the office  Holter monitoring in April 21 showed sinus bradycardia with heart rate of 38-96  Patient was asymptomatic at that time and was monitored  Chart review shows that patient's heart rate has been around mid 50-60 on most occasions  Recent echo during the hospital stay showed EF of 60 percent, grade 1 diastolic dysfunction, mild AR    Stress study done for chest pain in April 2021 was unremarkable  EKG in the office today showed sinus bradycardia with the premature atrial complex, heart rate of 54    Will refer to Cardiology for evaluation of syncope and bradycardia  Discussed with the patient and daughter who verbalized understanding  Subjective:     Patient ID: Marion Swain is a 79 y o  female  Patient comes for transition of care following recent hospital stay for unwitnessed syncope, workup in the hospital was unremarkable  Review of Systems   Constitutional: Negative for appetite change, chills, diaphoresis, fatigue, fever and unexpected weight change  Respiratory: Negative for apnea, cough, choking, chest tightness, shortness of breath, wheezing and stridor  Cardiovascular: Negative for chest pain, palpitations and leg swelling  Gastrointestinal: Negative for abdominal distention, abdominal pain, anal bleeding, blood in stool, constipation, diarrhea, nausea and vomiting  Genitourinary: Negative for decreased urine volume, difficulty urinating, frequency and urgency  Musculoskeletal: Negative for arthralgias, back pain and myalgias  Neurological: Negative for dizziness, light-headedness, numbness and headaches  Objective:     Physical Exam  Vitals reviewed  Constitutional:       General: She is not in acute distress  Appearance: Normal appearance  She is not ill-appearing, toxic-appearing or diaphoretic  HENT:      Mouth/Throat:      Mouth: Mucous membranes are moist    Cardiovascular:      Rate and Rhythm: Normal rate and regular rhythm  Pulses: Normal pulses  Heart sounds: Normal heart sounds  No murmur heard  No friction rub  No gallop  Pulmonary:      Effort: Pulmonary effort is normal  No respiratory distress  Breath sounds: Normal breath sounds  No stridor  No wheezing, rhonchi or rales  Chest:      Chest wall: No tenderness  Abdominal:      General: There is no distension  Palpations: Abdomen is soft  There is no mass  Tenderness: There is no abdominal tenderness  There is no rebound  Musculoskeletal:         General: No swelling or tenderness  Right lower leg: No edema  Left lower leg: No edema  Skin:     General: Skin is warm and dry  Findings: No lesion or rash  Neurological:      Mental Status: She is alert and oriented to person, place, and time  Vitals:    06/22/22 1021   BP: 136/76   BP Location: Right arm   Patient Position: Sitting   Cuff Size: Standard   Pulse: 100   Temp: (!) 97 2 °F (36 2 °C)   TempSrc: Temporal   SpO2: 97%   Weight: 63 kg (139 lb)   Height: 5' 2" (1 575 m)       Transitional Care Management Review:  Filomena Everett is a 79 y o  female here for TCM follow up       During the TCM phone call patient stated:    TCM Call (since 5/22/2022)     Date and time call was made  6/20/2022  9:03 AM    Hospital care reviewed  Records reviewed    Patient was hospitialized at  [facility]    Date of Admission  06/16/22    Date of discharge  06/17/22    Diagnosis  fall    Disposition  Home    Current Symptoms  None; Dizziness  Left side bruise    Dizziness severity  Mild      TCM Call (since 5/22/2022)     Post hospital issues  Reduced activity    Scheduled for follow up?   Yes    Did you obtain your prescribed medications  Yes    Do you need help managing your prescriptions or medications  No    Is transportation to your appointment needed  No    I have advised the patient to call PCP with any new or worsening symptoms  Kenia Krueger MD

## 2022-06-24 ENCOUNTER — CLINICAL SUPPORT (OUTPATIENT)
Dept: CARDIOLOGY CLINIC | Facility: CLINIC | Age: 82
End: 2022-06-24
Payer: MEDICARE

## 2022-06-24 ENCOUNTER — OFFICE VISIT (OUTPATIENT)
Dept: CARDIOLOGY CLINIC | Facility: CLINIC | Age: 82
End: 2022-06-24
Payer: MEDICARE

## 2022-06-24 VITALS
HEIGHT: 62 IN | BODY MASS INDEX: 26.37 KG/M2 | WEIGHT: 143.3 LBS | DIASTOLIC BLOOD PRESSURE: 70 MMHG | HEART RATE: 52 BPM | OXYGEN SATURATION: 96 % | SYSTOLIC BLOOD PRESSURE: 134 MMHG

## 2022-06-24 DIAGNOSIS — R94.31 ABNORMAL ECG: Primary | ICD-10-CM

## 2022-06-24 DIAGNOSIS — R00.1 BRADYCARDIA: ICD-10-CM

## 2022-06-24 DIAGNOSIS — R00.1 BRADYCARDIA: Chronic | ICD-10-CM

## 2022-06-24 DIAGNOSIS — R55 SYNCOPE, UNSPECIFIED SYNCOPE TYPE: ICD-10-CM

## 2022-06-24 PROCEDURE — 93246 EXT ECG>7D<15D RECORDING: CPT | Performed by: INTERNAL MEDICINE

## 2022-06-24 PROCEDURE — 93000 ELECTROCARDIOGRAM COMPLETE: CPT | Performed by: INTERNAL MEDICINE

## 2022-06-24 PROCEDURE — 99214 OFFICE O/P EST MOD 30 MIN: CPT | Performed by: INTERNAL MEDICINE

## 2022-06-24 NOTE — PROGRESS NOTES
Cardiology     Clinic Visit Note  Gretta Ribeiro 79 y o  female   MRN: [id number]    Assessment and Plan      Diagnoses and all orders for this visit:    1  Abnormal ECG - Worsening ST depressions and T wave inversions - would repeat stress test at this time concerning for ischemia especially in the setting of increasing shortness of breath on exertion  No syncope  No active chest pain  Poor functional capacity limits ability to do treadmill stress test   -     NM myocardial perfusion spect (rx stress and/or rest); Future    2  Syncope, unspecified syncope type - History is now of a mechanical fall - no evidence of cardiogenic syncope  She did not syncopize prior to falling  No chest pain  No shorntess of breath  No one actually ever even saw her passed out - this was just assumed  -     Ambulatory Referral to Cardiology    3  Bradycardia - Does have history of bradycardia and is also having episodes where her heart rate goes faster  Would obtain zio patch to rule out any arrhythmia - although feel this is less likely  She does have shortness of breath on exertion but no dizziness or pre syncopal symptoms that would suggest symptomatic bradycardia at this time - regardless she has palpitations at times as well and arrhythmias should be ruled out  -     Ambulatory Referral to Cardiology  -     POCT ECG  -     AMB extended holter monitor; Future          Health Maintenance:      Schedule a follow-up appointment in 3 months    Chief Complaint: Bradycardia  Subjective     History of Present Illness: This is a 79year old women with a PMH of GERD, HTN, HLD who presents today as a hospital follow up  She was admitted to the hospital in June of 2022 secondary to a fall  The patient at that time could not remember what happened but was found by her granddaughter next to a chair on the floor  She had complained of intermittent palpitations at home as well   EKG at that time was notable for ST depressions in the inferior leads and anterolateral leads  Troponin negative X 3  CBC and BMP were unremarkable  CT head was negative  An echocardiogram was performed that showed mild AI, grade I DD, and normal LVEF  The patient previously followed with Dr Nilson Gracia Last seen in 2021  She presents today with her daughter who helps translate  She had a nuclear stress test negative in 2015 and 2021  She does have a history of bradycardia with a heart rate of 38-96 BPM  No symptoms with it  Today the patient now states she can remember trying to stand on the chair reaching up to clean the window and slipped  She had no chest pain  No shortness of breath at that time  No dizziness before falling  No palpitations before falling  She stated she had no cardiovascular symptoms before falling out  She reports that she didn't syncope before falling and daughter confirms this  She reports today she has significant dyspnea on exertion that is getting worse  She has no associated chest pain or pressure with this  No passing out  No history of CAD in the past      She does also report having sudden onset of palpitations when she exerts herself  No passing out  She reports it goes away when she stops  She has no syncope  Pre syncope and no dizzness  Review of Systems   All other systems reviewed and are negative          Current Outpatient Medications:     acetaminophen (TYLENOL) 325 mg tablet, , Disp: , Rfl:     amLODIPine (NORVASC) 5 mg tablet, Take 1 tablet (5 mg total) by mouth daily, Disp: 90 tablet, Rfl: 1    Ascorbic Acid (vitamin C) 1000 MG tablet, Take 1,000 mg by mouth daily, Disp: , Rfl:     aspirin (ECOTRIN LOW STRENGTH) 81 mg EC tablet, Take 81 mg by mouth daily , Disp: , Rfl:     cholecalciferol (VITAMIN D3) 25 mcg (1,000 units) tablet, Take 1,000 Units by mouth daily, Disp: , Rfl:     citalopram (CeleXA) 20 mg tablet, TAKE 1/2 OR 1 TAB DAILY AS DIRECTED BY DOCTOR, Disp: 90 tablet, Rfl: 3    dorzolamide (TRUSOPT) 2 % ophthalmic solution, , Disp: , Rfl:     Lumigan 0 01 % ophthalmic drops, , Disp: , Rfl:     Multiple Vitamin tablet, Take by mouth, Disp: , Rfl:     olmesartan (BENICAR) 20 mg tablet, Take 1 tablet (20 mg total) by mouth 2 (two) times a day, Disp: 180 tablet, Rfl: 0    omeprazole (PriLOSEC) 20 mg delayed release capsule, , Disp: , Rfl:     rosuvastatin (CRESTOR) 5 mg tablet, TAKE 1 TABLET (5 MG TOTAL) BY MOUTH DAILY AT BEDTIME, Disp: 90 tablet, Rfl: 1    sodium chloride (OCEAN) 0 65 % nasal spray, 1 spray into each nostril every 2 (two) hours while awake, Disp: 60 mL, Rfl: 1    Travatan Z 0 004 % ophthalmic solution, , Disp: , Rfl:   Past Medical History:   Diagnosis Date    Aortic insufficiency     Cee's esophagus     Gastroesophageal reflux disease     Glaucoma     Hypercholesterolemia     Hyperlipidemia     Hypertension     Kidney stones 2009    Low back pain     Major depressive disorder     Mitral valve insufficiency     Osteoporosis     Osteoporosis     Vitamin D deficiency      Past Surgical History:   Procedure Laterality Date    COLONOSCOPY  06/02/2011    Normal     CYSTOSCOPY  2009    Polyp, stones    MAMMO (HISTORICAL)  09/19/2016    Negative     OTHER SURGICAL HISTORY      Infection in arm      Social History     Socioeconomic History    Marital status: /Civil Union     Spouse name: Not on file    Number of children: Not on file    Years of education: Not on file    Highest education level: Not on file   Occupational History    Not on file   Tobacco Use    Smoking status: Never Smoker    Smokeless tobacco: Never Used   Vaping Use    Vaping Use: Never used   Substance and Sexual Activity    Alcohol use: Not Currently    Drug use: No    Sexual activity: Not Currently   Other Topics Concern    Not on file   Social History Narrative    Not on file     Social Determinants of Health     Financial Resource Strain: Not on file   Food Insecurity: Not on file   Transportation Needs: Not on file   Physical Activity: Not on file   Stress: Not on file   Social Connections: Not on file   Intimate Partner Violence: Not on file   Housing Stability: Not on file     Family History   Problem Relation Age of Onset    Cancer Mother 80        unknown type of abdominal cancer    No Known Problems Father     No Known Problems Sister     No Known Problems Daughter     No Known Problems Maternal Grandmother     No Known Problems Maternal Grandfather     No Known Problems Paternal Grandmother     No Known Problems Paternal Grandfather     No Known Problems Daughter     No Known Problems Son     Lung cancer Brother 79    Lung cancer Brother 72     No Known Allergies    Objective     Vitals:    06/24/22 1555   BP: 134/70   BP Location: Left arm   Patient Position: Sitting   Cuff Size: Standard   Pulse: (!) 52   SpO2: 96%   Weight: 65 kg (143 lb 4 8 oz)   Height: 5' 2" (1 575 m)       Physical exam:     Physical Exam  Constitutional:       Appearance: Normal appearance  Cardiovascular:      Rate and Rhythm: Normal rate and regular rhythm  Pulmonary:      Effort: Pulmonary effort is normal  No respiratory distress  Abdominal:      General: There is no distension  Skin:     General: Skin is warm  Neurological:      General: No focal deficit present  Mental Status: She is alert  Psychiatric:         Mood and Affect: Mood normal            ==  PLEASE NOTE:  This encounter was completed utilizing the [employer] Direct Speech Voice Recognition Software  Grammatical errors, random word insertions, pronoun errors and incomplete sentences are occasional consequences of the system due to software limitations, ambient noise and hardware issues  These may be missed by proof reading prior to affixing electronic signature   Any questions or concerns about the content, text or information contained within the body of this dictation should be directly addressed to the physician for clarification  Please do not hesitate to call me directly if you have any any questions or concerns

## 2022-07-05 ENCOUNTER — OFFICE VISIT (OUTPATIENT)
Dept: INTERNAL MEDICINE CLINIC | Facility: CLINIC | Age: 82
End: 2022-07-05
Payer: MEDICARE

## 2022-07-05 VITALS
BODY MASS INDEX: 25.91 KG/M2 | SYSTOLIC BLOOD PRESSURE: 150 MMHG | WEIGHT: 140.8 LBS | HEART RATE: 56 BPM | HEIGHT: 62 IN | TEMPERATURE: 97.4 F | OXYGEN SATURATION: 97 % | DIASTOLIC BLOOD PRESSURE: 70 MMHG

## 2022-07-05 DIAGNOSIS — I10 ESSENTIAL HYPERTENSION: ICD-10-CM
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
## 2022-07-05 DIAGNOSIS — Z00.00 MEDICARE ANNUAL WELLNESS VISIT, SUBSEQUENT: ICD-10-CM

## 2022-07-05 DIAGNOSIS — R42 DIZZINESS: ICD-10-CM
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
## 2022-07-05 PROCEDURE — 99214 OFFICE O/P EST MOD 30 MIN: CPT | Performed by: INTERNAL MEDICINE

## 2022-07-05 PROCEDURE — G0439 PPPS, SUBSEQ VISIT: HCPCS | Performed by: INTERNAL MEDICINE

## 2022-07-05 NOTE — PROGRESS NOTES
Assessment and Plan:     Problem List Items Addressed This Visit        Cardiovascular and Mediastinum    Essential hypertension       Other    Dizziness      Other Visit Diagnoses     Fall as cause of accidental injury in home as place of occurrence, subsequent encounter    -  Primary    This was a mechanical fall  She has some bruising and contusion on the left lower extremity and concussion of head is improving  Medicare annual wellness visit, subsequent               Preventive health issues were discussed with patient, and age appropriate screening tests were ordered as noted in patient's After Visit Summary  Personalized health advice and appropriate referrals for health education or preventive services given if needed, as noted in patient's After Visit Summary  History of Present Illness:     Patient presents for a Medicare Wellness Visit    HPI   Patient Care Team:  Jayme Bella MD as PCP - General     Review of Systems:     Review of Systems   Constitutional: Negative for appetite change, chills, fatigue and fever  HENT: Negative for sore throat and trouble swallowing  Eyes: Negative for redness  Respiratory: Negative for shortness of breath  Cardiovascular: Negative for chest pain and palpitations  Gastrointestinal: Negative for abdominal pain, constipation and diarrhea  Genitourinary: Negative for dysuria and hematuria  Musculoskeletal: Positive for gait problem  Negative for back pain and neck pain  Skin: Negative for rash  Neurological: Negative for seizures, weakness and headaches  Hematological: Negative for adenopathy  Psychiatric/Behavioral: Negative for confusion  The patient is not nervous/anxious           Problem List:     Patient Active Problem List   Diagnosis    Essential hypertension    Hyperlipemia    Anxiety    Bradycardia    Dizziness    Pancreatic cyst    Vitamin D deficiency    Dysthymia    Gastroesophageal reflux disease without esophagitis  Fall      Past Medical and Surgical History:     Past Medical History:   Diagnosis Date    Aortic insufficiency     Cee's esophagus     Gastroesophageal reflux disease     Glaucoma     Hypercholesterolemia     Hyperlipidemia     Hypertension     Kidney stones 2009    Low back pain     Major depressive disorder     Mitral valve insufficiency     Osteoporosis     Osteoporosis     Vitamin D deficiency      Past Surgical History:   Procedure Laterality Date    COLONOSCOPY  06/02/2011    Normal     CYSTOSCOPY  2009    Polyp, stones    MAMMO (HISTORICAL)  09/19/2016    Negative     OTHER SURGICAL HISTORY      Infection in arm       Family History:     Family History   Problem Relation Age of Onset    Cancer Mother 80        unknown type of abdominal cancer    No Known Problems Father     No Known Problems Sister     No Known Problems Daughter     No Known Problems Maternal Grandmother     No Known Problems Maternal Grandfather     No Known Problems Paternal Grandmother     No Known Problems Paternal Grandfather     No Known Problems Daughter     No Known Problems Son     Lung cancer Brother 79    Lung cancer Brother 72      Social History:     Social History     Socioeconomic History    Marital status: /Civil Union     Spouse name: None    Number of children: None    Years of education: None    Highest education level: None   Occupational History    None   Tobacco Use    Smoking status: Never Smoker    Smokeless tobacco: Never Used   Vaping Use    Vaping Use: Never used   Substance and Sexual Activity    Alcohol use: Not Currently    Drug use: No    Sexual activity: Not Currently   Other Topics Concern    None   Social History Narrative    None     Social Determinants of Health     Financial Resource Strain: Not on file   Food Insecurity: Not on file   Transportation Needs: Not on file   Physical Activity: Not on file   Stress: Not on file   Social Connections: Not on file   Intimate Partner Violence: Not on file   Housing Stability: Not on file      Medications and Allergies:     Current Outpatient Medications   Medication Sig Dispense Refill    acetaminophen (TYLENOL) 325 mg tablet       amLODIPine (NORVASC) 5 mg tablet Take 1 tablet (5 mg total) by mouth daily 90 tablet 1    Ascorbic Acid (vitamin C) 1000 MG tablet Take 1,000 mg by mouth daily      aspirin (ECOTRIN LOW STRENGTH) 81 mg EC tablet Take 81 mg by mouth daily       cholecalciferol (VITAMIN D3) 25 mcg (1,000 units) tablet Take 1,000 Units by mouth daily      citalopram (CeleXA) 20 mg tablet TAKE 1/2 OR 1 TAB DAILY AS DIRECTED BY DOCTOR 90 tablet 3    dorzolamide (TRUSOPT) 2 % ophthalmic solution       Lumigan 0 01 % ophthalmic drops       Multiple Vitamin tablet Take by mouth      olmesartan (BENICAR) 20 mg tablet Take 1 tablet (20 mg total) by mouth 2 (two) times a day 180 tablet 0    omeprazole (PriLOSEC) 20 mg delayed release capsule       rosuvastatin (CRESTOR) 5 mg tablet TAKE 1 TABLET (5 MG TOTAL) BY MOUTH DAILY AT BEDTIME 90 tablet 1    sodium chloride (OCEAN) 0 65 % nasal spray 1 spray into each nostril every 2 (two) hours while awake 60 mL 1    Travatan Z 0 004 % ophthalmic solution        No current facility-administered medications for this visit  No Known Allergies   Immunizations:     Immunization History   Administered Date(s) Administered    COVID-19 PFIZER VACCINE 0 3 ML IM 03/11/2021, 04/01/2021    INFLUENZA 10/21/2009, 09/09/2020    Pneumococcal Polysaccharide PPV23 06/24/2011    Tdap 04/24/2013    influenza, trivalent, adjuvanted 09/09/2020      Health Maintenance: There are no preventive care reminders to display for this patient        Topic Date Due    Pneumococcal Vaccine: 65+ Years (2 - PCV) 06/24/2012    COVID-19 Vaccine (3 - Booster for Pfizer series) 09/01/2021    Influenza Vaccine (1) 09/01/2022      Medicare Screening Tests and Risk Assessments:     Gerri Guzman is here for her Subsequent Wellness visit  Health Risk Assessment:   Patient rates overall health as good  Patient feels that their physical health rating is same  Patient is satisfied with their life  Eyesight was rated as same  Hearing was rated as same  Patient feels that their emotional and mental health rating is same  Patients states they are never, rarely angry  Patient states they are sometimes unusually tired/fatigued  Pain experienced in the last 7 days has been some  Patient's pain rating has been 3/10  Patient states that she has experienced no weight loss or gain in last 6 months  Depression Screening:   PHQ-9 Score: 3      Fall Risk Screening: In the past year, patient has experienced: history of falling in past year    Number of falls: 1  Injured during fall?: Yes    Feels unsteady when standing or walking?: Yes    Worried about falling?: No      Urinary Incontinence Screening:   Patient has not leaked urine accidently in the last six months  Home Safety:  Patient does not have trouble with stairs inside or outside of their home  Patient has working smoke alarms and has working carbon monoxide detector  Home safety hazards include: none  Nutrition:   Current diet is Regular and Limited junk food  Medications:   Patient is currently taking over-the-counter supplements  OTC medications include: see medication list  Patient is able to manage medications  Activities of Daily Living (ADLs)/Instrumental Activities of Daily Living (IADLs):   Walk and transfer into and out of bed and chair?: Yes  Dress and groom yourself?: Yes    Bathe or shower yourself?: Yes    Feed yourself?  Yes  Do your laundry/housekeeping?: Yes  Manage your money, pay your bills and track your expenses?: Yes  Make your own meals?: Yes    Do your own shopping?: Yes    Previous Hospitalizations:   Any hospitalizations or ED visits within the last 12 months?: Yes    How many hospitalizations have you had in the last year?: 1-2    Advance Care Planning:   Living will: Yes    Advanced directive: Yes      PREVENTIVE SCREENINGS      Cardiovascular Screening:    General: Screening Not Indicated and History Lipid Disorder      Diabetes Screening:     General: Screening Current      Breast Cancer Screening:     General: Screening Current      Cervical Cancer Screening:    General: Screening Not Indicated      Osteoporosis Screening:    General: Screening Not Indicated and History Osteoporosis      Lung Cancer Screening:     General: Screening Not Indicated    Screening, Brief Intervention, and Referral to Treatment (SBIRT)    Screening  Typical number of drinks in a day: 0  Typical number of drinks in a week: 0  Interpretation: Low risk drinking behavior  Brief Intervention  Alcohol & drug use screenings were reviewed  No concerns regarding substance use disorder identified  Other Counseling Topics:   Car/seat belt/driving safety, skin self-exam, sunscreen and regular weightbearing exercise and calcium and vitamin D intake  No exam data present     Physical Exam:     /70 (BP Location: Left arm, Patient Position: Sitting, Cuff Size: Standard)   Pulse 56   Temp (!) 97 4 °F (36 3 °C) (Temporal)   Ht 5' 2" (1 575 m)   Wt 63 9 kg (140 lb 12 8 oz)   SpO2 97% Comment: RA  BMI 25 75 kg/m²     Physical Exam  Vitals and nursing note reviewed  Constitutional:       General: She is not in acute distress  Appearance: She is well-developed  HENT:      Head: Normocephalic and atraumatic  Right Ear: Tympanic membrane normal       Left Ear: Tympanic membrane normal       Mouth/Throat:      Mouth: Mucous membranes are moist    Eyes:      Conjunctiva/sclera: Conjunctivae normal    Cardiovascular:      Rate and Rhythm: Normal rate and regular rhythm  Heart sounds: No murmur heard  Pulmonary:      Effort: Pulmonary effort is normal  No respiratory distress  Breath sounds: Normal breath sounds  Abdominal:      Palpations: Abdomen is soft  Tenderness: There is no abdominal tenderness  Musculoskeletal:      Cervical back: Neck supple  Skin:     General: Skin is warm and dry  Neurological:      Mental Status: She is alert        Gait: Gait abnormal           Lilliana Muro MD

## 2022-07-14 ENCOUNTER — CLINICAL SUPPORT (OUTPATIENT)
Dept: CARDIOLOGY CLINIC | Facility: CLINIC | Age: 82
End: 2022-07-14
Payer: MEDICARE

## 2022-07-14 DIAGNOSIS — R00.1 BRADYCARDIA: ICD-10-CM

## 2022-07-14 PROCEDURE — 93248 EXT ECG>7D<15D REV&INTERPJ: CPT | Performed by: INTERNAL MEDICINE

## 2022-07-15 ENCOUNTER — HOSPITAL ENCOUNTER (OUTPATIENT)
Dept: NON INVASIVE DIAGNOSTICS | Facility: CLINIC | Age: 82
Discharge: HOME | End: 2022-07-15
Payer: MEDICARE

## 2022-07-15 VITALS
BODY MASS INDEX: 25.76 KG/M2 | WEIGHT: 140 LBS | HEIGHT: 62 IN | OXYGEN SATURATION: 96 % | SYSTOLIC BLOOD PRESSURE: 180 MMHG | HEART RATE: 52 BPM | DIASTOLIC BLOOD PRESSURE: 78 MMHG

## 2022-07-15 DIAGNOSIS — R94.31 ABNORMAL ECG: ICD-10-CM

## 2022-07-15 LAB
MAX HR PERCENT: 71 %
MAX HR: 99 BPM
NUC STRESS EJECTION FRACTION: 77 %
RATE PRESSURE PRODUCT: NORMAL
SL CV REST NUCLEAR ISOTOPE DOSE: 9.76 MCI
SL CV STRESS NUCLEAR ISOTOPE DOSE: 31.6 MCI
SL CV STRESS RECOVERY BP: NORMAL MMHG
SL CV STRESS RECOVERY HR: 66 BPM
SL CV STRESS RECOVERY O2 SAT: 98 %
STRESS ANGINA INDEX: 0
STRESS BASELINE BP: NORMAL MMHG
STRESS BASELINE HR: 52 BPM
STRESS DUKE TREADMILL SCORE: -5
STRESS O2 SAT REST: 96 %
STRESS PEAK HR: 99 BPM
STRESS POST EXERCISE DUR MIN: 3 MIN
STRESS POST EXERCISE DUR SEC: 0 SEC
STRESS POST O2 SAT PEAK: 98 %
STRESS POST PEAK BP: 140 MMHG
STRESS ST DEPRESSION: 1.5 MM
STRESS/REST PERFUSION RATIO: 0.95

## 2022-07-15 PROCEDURE — 78452 HT MUSCLE IMAGE SPECT MULT: CPT

## 2022-07-15 PROCEDURE — 93017 CV STRESS TEST TRACING ONLY: CPT

## 2022-07-15 PROCEDURE — 78452 HT MUSCLE IMAGE SPECT MULT: CPT | Performed by: INTERNAL MEDICINE

## 2022-07-15 PROCEDURE — A9502 TC99M TETROFOSMIN: HCPCS

## 2022-07-15 PROCEDURE — 93016 CV STRESS TEST SUPVJ ONLY: CPT | Performed by: INTERNAL MEDICINE

## 2022-07-15 PROCEDURE — 93018 CV STRESS TEST I&R ONLY: CPT | Performed by: INTERNAL MEDICINE

## 2022-07-15 RX ADMIN — REGADENOSON 0.4 MG: 0.08 INJECTION, SOLUTION INTRAVENOUS at 13:11

## 2022-07-20 LAB
CHEST PAIN STATEMENT: NORMAL
MAX DIASTOLIC BP: 78 MMHG
MAX HEART RATE: 99 BPM
MAX PREDICTED HEART RATE: 138 BPM
MAX. SYSTOLIC BP: 180 MMHG
PROTOCOL NAME: NORMAL
REASON FOR TERMINATION: NORMAL
TARGET HR FORMULA: NORMAL
TEST INDICATION: NORMAL
TIME IN EXERCISE PHASE: NORMAL

## 2022-07-21 NOTE — RESULT ENCOUNTER NOTE
Unable to get a hold of patient   Called daughter and informed her regarding results of negative stress test

## 2022-08-03 ENCOUNTER — OFFICE VISIT (OUTPATIENT)
Dept: RHEUMATOLOGY | Facility: CLINIC | Age: 82
End: 2022-08-03
Payer: MEDICARE

## 2022-08-03 ENCOUNTER — APPOINTMENT (OUTPATIENT)
Dept: LAB | Facility: HOSPITAL | Age: 82
End: 2022-08-03
Attending: INTERNAL MEDICINE
Payer: MEDICARE

## 2022-08-03 VITALS
HEIGHT: 62 IN | DIASTOLIC BLOOD PRESSURE: 80 MMHG | SYSTOLIC BLOOD PRESSURE: 130 MMHG | WEIGHT: 140 LBS | BODY MASS INDEX: 25.76 KG/M2

## 2022-08-03 DIAGNOSIS — D89.89 OTHER SPECIFIED DISORDERS INVOLVING THE IMMUNE MECHANISM, NOT ELSEWHERE CLASSIFIED (HCC): ICD-10-CM

## 2022-08-03 DIAGNOSIS — M81.0 AGE-RELATED OSTEOPOROSIS WITHOUT CURRENT PATHOLOGICAL FRACTURE: ICD-10-CM

## 2022-08-03 LAB
ALBUMIN SERPL BCP-MCNC: 3.3 G/DL (ref 3.5–5)
ALP SERPL-CCNC: 68 U/L (ref 46–116)
ALT SERPL W P-5'-P-CCNC: 26 U/L (ref 12–78)
ANION GAP SERPL CALCULATED.3IONS-SCNC: 7 MMOL/L (ref 4–13)
AST SERPL W P-5'-P-CCNC: 21 U/L (ref 5–45)
BILIRUB SERPL-MCNC: 0.39 MG/DL (ref 0.2–1)
BUN SERPL-MCNC: 14 MG/DL (ref 5–25)
CALCIUM ALBUM COR SERPL-MCNC: 9.6 MG/DL (ref 8.3–10.1)
CALCIUM SERPL-MCNC: 9 MG/DL (ref 8.3–10.1)
CHLORIDE SERPL-SCNC: 109 MMOL/L (ref 96–108)
CK SERPL-CCNC: 110 U/L (ref 26–192)
CO2 SERPL-SCNC: 22 MMOL/L (ref 21–32)
CREAT SERPL-MCNC: 0.86 MG/DL (ref 0.6–1.3)
CRP SERPL QL: <3 MG/L
ERYTHROCYTE [SEDIMENTATION RATE] IN BLOOD: 23 MM/HOUR (ref 0–29)
GFR SERPL CREATININE-BSD FRML MDRD: 63 ML/MIN/1.73SQ M
GLUCOSE P FAST SERPL-MCNC: 151 MG/DL (ref 65–99)
POTASSIUM SERPL-SCNC: 3.9 MMOL/L (ref 3.5–5.3)
PROT SERPL-MCNC: 7.4 G/DL (ref 6.4–8.4)
SODIUM SERPL-SCNC: 138 MMOL/L (ref 135–147)
TSH SERPL DL<=0.05 MIU/L-ACNC: 1.43 UIU/ML (ref 0.45–4.5)

## 2022-08-03 PROCEDURE — 86140 C-REACTIVE PROTEIN: CPT

## 2022-08-03 PROCEDURE — 80053 COMPREHEN METABOLIC PANEL: CPT

## 2022-08-03 PROCEDURE — 99204 OFFICE O/P NEW MOD 45 MIN: CPT | Performed by: INTERNAL MEDICINE

## 2022-08-03 PROCEDURE — 82550 ASSAY OF CK (CPK): CPT

## 2022-08-03 PROCEDURE — 84443 ASSAY THYROID STIM HORMONE: CPT

## 2022-08-03 PROCEDURE — 36415 COLL VENOUS BLD VENIPUNCTURE: CPT

## 2022-08-03 PROCEDURE — 85652 RBC SED RATE AUTOMATED: CPT

## 2022-08-03 NOTE — PROGRESS NOTES
Rheumatology Consult   Rd Colin 79 y o  female 1940    DATE: 8/3/2022    Reason for Consult: LE myalgias s/p fall    Assessment and Plan:  LE myalgias and some shoulder discomfort--check ESR/CRP  PT for balance and muscle strengthening  Increase Vit  D to 2000I/U  Increase calcium as patient does not eat dairy--Calcium citrate 600mg BID  Topical NSAIDs/analgesics PRN joint pain for right hand OA  F/u in 6 mos  or sooner if necessary  History of Present Illness:  Rd Colin is a 79 y o  female Here for initial eval   S/p Fall in June 2022  CT scan of head and CXR reviewed  All previous labs reviewed  Overall feels better aside from b/l LE myalgias and some b/l shoulder discomfort  No other complaints  Review of Systems  Review of Systems   Constitutional: Negative for fatigue, fever and unexpected weight change  HENT: Negative for mouth sores  Respiratory: Negative for cough and shortness of breath  Cardiovascular: Negative for chest pain and leg swelling  Gastrointestinal: Negative for abdominal pain, constipation and diarrhea  Musculoskeletal: Positive for myalgias  Negative for arthralgias, back pain and joint swelling  Skin: Negative for color change and rash  Neurological: Negative for weakness  Hematological: Negative for adenopathy  Psychiatric/Behavioral: Negative for sleep disturbance         Allergies  No Known Allergies    Current Medications      Past Medical History  Past Medical History:   Diagnosis Date    Aortic insufficiency     Cee's esophagus     Gastroesophageal reflux disease     Glaucoma     Hypercholesterolemia     Hyperlipidemia     Hypertension     Kidney stones 2009    Low back pain     Major depressive disorder     Mitral valve insufficiency     Osteoporosis     Osteoporosis     Vitamin D deficiency        Past Surgical History  Past Surgical History:   Procedure Laterality Date    COLONOSCOPY  06/02/2011    Normal     CYSTOSCOPY  2009    Polyp, stones    MAMMO (HISTORICAL)  09/19/2016    Negative     OTHER SURGICAL HISTORY      Infection in arm        Family History  No known autoimmune or inflammatory diseases in the family  Family History   Problem Relation Age of Onset   Ankit Pert Cancer Mother 80        unknown type of abdominal cancer    No Known Problems Father     No Known Problems Sister     No Known Problems Daughter     No Known Problems Maternal Grandmother     No Known Problems Maternal Grandfather     No Known Problems Paternal Grandmother     No Known Problems Paternal Grandfather     No Known Problems Daughter     No Known Problems Son     Lung cancer Brother 79    Lung cancer Brother 72       Social History  Occupation: retired  Social History     Substance and Sexual Activity   Alcohol Use Not Currently     Social History     Substance and Sexual Activity   Drug Use No     Social History     Tobacco Use   Smoking Status Never Smoker   Smokeless Tobacco Never Used        Objective:  /80   Ht 5' 2" (1 575 m)   Wt 63 5 kg (140 lb)   BMI 25 61 kg/m²     Physical Exam  Constitutional:       General: She is not in acute distress  HENT:      Head: Normocephalic and atraumatic  Eyes:      Conjunctiva/sclera: Conjunctivae normal    Cardiovascular:      Rate and Rhythm: Normal rate and regular rhythm  Heart sounds: S1 normal and S2 normal      No friction rub  Pulmonary:      Effort: Pulmonary effort is normal  No respiratory distress  Breath sounds: Normal breath sounds  No wheezing, rhonchi or rales  Musculoskeletal:      Right shoulder: Tenderness present  Left shoulder: Tenderness present  Right hand: Deformity present  Cervical back: Neck supple  Comments: +2nd/3rd Heberden's nodes  Skin:     General: Skin is warm and dry  Coloration: Skin is not pale  Findings: No rash  Neurological:      Mental Status: She is alert  Mental status is at baseline  Psychiatric:         Mood and Affect: Mood normal          Behavior: Behavior normal             Lab Results: I have personally reviewed pertinent reports        CBC:   , Chemistry Profile:       Invalid input(s): ALBUMIN, Coagulation Studies:   , Cardiac Studies:   , Additional Labs:   , iSTAT CHEM 8:       Invalid input(s): POTASSIUMIS, ABG:   , Toxicology:   , Last A1C/Lipid Panel/Thyroid Panel:   Lab Results   Component Value Date    TRIG 171 (H) 03/10/2022    TRIG 184 (H) 11/06/2021    CHOL 274 10/01/2015    CHOL 237 11/28/2014    HDL 66 03/10/2022    HDL 68 11/06/2021    LDLCALC 155 (H) 03/10/2022    LDLCALC 79 11/06/2021    [id number] 1 840 03/10/2022    FREET4 1 13 05/12/2017     Lab Results   Component Value Date    CRP 1 8 04/30/2015           Invalid input(s): URIBILINOGEN         Imaging: I have personally reviewed pertinent films in PACS

## 2022-08-03 NOTE — PATIENT INSTRUCTIONS
Please have your blood work done when you can  Take vitamin D 2000 I/U  Take calcium citrate (600mg twice per day)  (Citracal)  I placed an order for physical therapy

## 2022-08-22 ENCOUNTER — EVALUATION (OUTPATIENT)
Dept: PHYSICAL THERAPY | Facility: REHABILITATION | Age: 82
End: 2022-08-22
Payer: MEDICARE

## 2022-08-22 DIAGNOSIS — R53.1 GENERAL WEAKNESS: Primary | ICD-10-CM

## 2022-08-22 DIAGNOSIS — M79.605 LEG PAIN, BILATERAL: ICD-10-CM

## 2022-08-22 DIAGNOSIS — R26.81 UNSTEADY GAIT: ICD-10-CM

## 2022-08-22 DIAGNOSIS — D89.89 OTHER SPECIFIED DISORDERS INVOLVING THE IMMUNE MECHANISM, NOT ELSEWHERE CLASSIFIED (HCC): ICD-10-CM

## 2022-08-22 DIAGNOSIS — M81.0 AGE-RELATED OSTEOPOROSIS WITHOUT CURRENT PATHOLOGICAL FRACTURE: ICD-10-CM

## 2022-08-22 DIAGNOSIS — M79.604 LEG PAIN, BILATERAL: ICD-10-CM

## 2022-08-22 PROCEDURE — 97110 THERAPEUTIC EXERCISES: CPT | Performed by: PHYSICAL THERAPIST

## 2022-08-22 PROCEDURE — 97162 PT EVAL MOD COMPLEX 30 MIN: CPT | Performed by: PHYSICAL THERAPIST

## 2022-08-22 NOTE — PROGRESS NOTES
PT Evaluation     Today's date: 2022  Patient name: Jesus Christian  : 1940  MRN: [id number]  Referring provider: Gene Valdez MD  Dx:   Encounter Diagnosis     ICD-10-CM    1  Age-related osteoporosis without current pathological fracture  M81 0 Ambulatory Referral to Physical Therapy   2  Other specified disorders involving the immune mechanism, not elsewhere classified [facility])   E73 32 Ambulatory Referral to Physical Therapy                  Assessment  Assessment details: Jesus Christian is a 79 y o  female presenting with generalized weakness and diffuse LE pain S/P fall in   Primary impairments include decreased balance, endurance, weakness, and pain  Will benefit from skilled PT interventions for improve community navigation HEP was provided and reviewed  Patient is able to complete HEP with good technique and appropriate pain response  Patient expressed understanding of appropriate dosage and frequency of HEP  No additional referral necessary  Impairments: abnormal coordination, abnormal muscle firing, abnormal or restricted ROM, abnormal movement, activity intolerance, impaired balance, impaired physical strength, lacks appropriate home exercise program, pain with function, poor posture  and poor body mechanics  Functional limitations: walking, standing, transfers  Symptom irritability: moderateBarriers to therapy: Age, language, comorbidities  Understanding of Dx/Px/POC: good   Prognosis: good    Goals    Short Term Goals: In 4 weeks, the patient will:  1  Hip mmt to 4/5 (B)  2  SLS to >10" (B)  3  Supervision with HEP for self care    Long Term Goals: In 8 weeks, the patient will:  1  5tst to <12s, SLS to >15" (B)  2  FOTO to greater than predicted value  3   Independent with HEP for selfcare      Plan  Patient would benefit from: skilled physical therapy  Planned therapy interventions: joint mobilization, manual therapy, massage, Peguero taping, neuromuscular re-education, patient education, postural training, self care, strengthening, stretching, therapeutic activities, therapeutic exercise, therapeutic training, graded exercise, graded activity, home exercise program, flexibility, functional ROM exercises, balance and activity modification  Frequency: 2x week  Duration in weeks: 8  Treatment plan discussed with: patient        Subjective  Pain Location: (B) LE diffuse myalgia  Pain Intensity: 2-4/10  ESTELLE: Fall on June 16, 2022, general (B) LE soreness and pain since fall  Provoked by: feels general weak with intermittent non-provokable pain  Eased Positions: AROM, strengthening,   Constitutional S/S: denies   Goals: improve strength, reduce pain, improve function    Objective    Vital Signs: 160/77         Red Flags: none    Range of Motion: WFL all planes (B) LE    Strength: MMT revealed the following findings    Joint Motion Right: 8/22/2022 Left: 8/22/2022   Hip Flexion 4-/5 4-/5   Hip Abduction 4/5 3+/5   Hip Adduction 4-/5 3+/5   Hip Extension 4/5 3+/5   Knee Extension 5/5 4/5   Knee Flexion 5/5 4/5   Ankle Plantarflexion 5/5 4/5   Ankle Dorsiflexion 5/5 5/5     Additional Assessments:  Palpation: Generally diffuse TTP throughout (B) LE  Observation: pleasant elderly female  Gait Pattern: decreased stride length  Balance: Rhomberg, WNL, Tandem 10" (B), SLS 2" on L 6" on R  Joint Mobility: WNL  5TST: 17s    Special Tests:Not indicated       Precautions: Osteoporosis, Hx of Falls,     Pertinent Findings and Outcome Measures:                                                                                                                                                                         Test / Measure  8/22/2022   FOTO 56   L Hip abd 3+/5   L SLS 2"       Manuals 8/22                   Neuro Re-Ed    Tandem    SLS                        Ther Ex    Nustep 8' L5   SLR Flexion    SLR abd    Clamshells HL                    Ther Activity    STS    FSU    LSU    Gait Training Modalities

## 2022-08-24 ENCOUNTER — OFFICE VISIT (OUTPATIENT)
Dept: PHYSICAL THERAPY | Facility: REHABILITATION | Age: 82
End: 2022-08-24
Payer: MEDICARE

## 2022-08-24 DIAGNOSIS — R26.81 UNSTEADY GAIT: ICD-10-CM

## 2022-08-24 DIAGNOSIS — M79.604 LEG PAIN, BILATERAL: ICD-10-CM

## 2022-08-24 DIAGNOSIS — D89.89 OTHER SPECIFIED DISORDERS INVOLVING THE IMMUNE MECHANISM, NOT ELSEWHERE CLASSIFIED (HCC): ICD-10-CM

## 2022-08-24 DIAGNOSIS — M79.605 LEG PAIN, BILATERAL: ICD-10-CM

## 2022-08-24 DIAGNOSIS — M81.0 AGE-RELATED OSTEOPOROSIS WITHOUT CURRENT PATHOLOGICAL FRACTURE: Primary | ICD-10-CM

## 2022-08-24 DIAGNOSIS — R53.1 GENERAL WEAKNESS: ICD-10-CM

## 2022-08-24 PROCEDURE — 97110 THERAPEUTIC EXERCISES: CPT

## 2022-08-24 PROCEDURE — 97530 THERAPEUTIC ACTIVITIES: CPT

## 2022-08-24 PROCEDURE — 97112 NEUROMUSCULAR REEDUCATION: CPT

## 2022-08-24 NOTE — PROGRESS NOTES
Daily Note     Today's date: 2022  Patient name: Carlee Rainey  : 1940  MRN: [id number]  Referring provider: Brennan Collazo MD  Dx:   Encounter Diagnosis     ICD-10-CM    1  Age-related osteoporosis without current pathological fracture  M81 0    2  Other specified disorders involving the immune mechanism, not elsewhere [facility] ([facility] )   D89 89    3  General weakness  R53 1    4  Unsteady gait  R26 81    5  Leg pain, bilateral  M79 604     M79 605        Start Time: 1530  Stop Time: 1615  Total time in clinic (min): 45 minutes    Subjective: Pt noted that she still has some weakness in her legs with L>R but has noticed some better strength these last couple of days  Pt notes her pain is not bothering her too much today but her exercises are only going okay because she "has been lazy the last few days"  Objective: See treatment diary below      Assessment: Tolerated treatment fair  Pt continues to note pain and decreased strength at the LLE but responded well to addition of further strengthening and loading exercises today  Pt benefits from moderate level verbal and tactile cues for posterior chain activation during clam shells as well as knee alignment and quadriceps activation with STS  Pt displays improved muscle activation and technique following cueing  Patient would benefit from continued PT focused on increasing functional LE strength and static/dynamic balance  1:1 with Disha Warner DPT entirety of tx  Plan: Continue per plan of care        Precautions: Osteoporosis, Hx of Falls,     Pertinent Findings and Outcome Measures:                                                                                                                                                                         Test / Measure  2022   FOTO 56   L Hip abd 3+/5   L SLS 2"       Manuals 2022     Passive Knee/Hip ROM  5 min                  Neuro Re-Ed     Tandem     SLS  3x 20 sec, BL Standing Hip Flexion  2x10, BL                       Ther Ex     Nustep 8' L5 8' L5   SLR Flexion  2x10, BL   SLR abd  1x10   Clamshells HL  2x10 BL, manual resistance                       Ther Activity     STS  2x8, with BTB at knees   FSU  2x5, 6" step   LSU     Gait Training               Modalities

## 2022-08-29 ENCOUNTER — OFFICE VISIT (OUTPATIENT)
Dept: PHYSICAL THERAPY | Facility: REHABILITATION | Age: 82
End: 2022-08-29
Payer: MEDICARE

## 2022-08-29 DIAGNOSIS — D89.89 OTHER SPECIFIED DISORDERS INVOLVING THE IMMUNE MECHANISM, NOT ELSEWHERE CLASSIFIED (HCC): ICD-10-CM

## 2022-08-29 DIAGNOSIS — M81.0 AGE-RELATED OSTEOPOROSIS WITHOUT CURRENT PATHOLOGICAL FRACTURE: Primary | ICD-10-CM

## 2022-08-29 DIAGNOSIS — R26.81 UNSTEADY GAIT: ICD-10-CM

## 2022-08-29 DIAGNOSIS — R53.1 GENERAL WEAKNESS: ICD-10-CM

## 2022-08-29 DIAGNOSIS — M79.604 LEG PAIN, BILATERAL: ICD-10-CM

## 2022-08-29 DIAGNOSIS — M79.605 LEG PAIN, BILATERAL: ICD-10-CM

## 2022-08-29 PROCEDURE — 97110 THERAPEUTIC EXERCISES: CPT

## 2022-08-29 PROCEDURE — 97112 NEUROMUSCULAR REEDUCATION: CPT

## 2022-08-29 PROCEDURE — 97530 THERAPEUTIC ACTIVITIES: CPT

## 2022-08-29 NOTE — PROGRESS NOTES
Daily Note     Today's date: 2022  Patient name: Fred Sibley  : 1940  MRN: [id number]  Referring provider: Xiang Schwartz MD  Dx:   Encounter Diagnosis     ICD-10-CM    1  Age-related osteoporosis without current pathological fracture  M81 0    2  Other specified disorders involving the immune mechanism, not elsewhere classified ([facility] )   D89 89    3  General weakness  R53 1    4  Unsteady gait  R26 81    5  Leg pain, bilateral  M79 604     M79 605        Start Time: 935  Stop Time: 1013  Total time in clinic (min): 38 minutes    Subjective: Pt reports no new complaints for today's session  Notes minor pain over the weekend, was able to walk 40 minutes yesterday  Objective: See treatment diary below      Assessment: Tolerated treatment well  No pain/tightness reported with LE PROM, np today  Pt noting occasional R knee pain during SLR flexion today, improved with rest  Pt most challenged by SLS, reports fatigue post-tx and no pain increase  Patient would benefit from continued PT focused on increasing functional LE strength and static/dynamic balance  1:1 with Inna Christine DPT for entirety of tx  Plan: Continue per plan of care        Precautions: Osteoporosis, Hx of Falls,     Pertinent Findings and Outcome Measures:                                                                                                                                                                         Test / Measure  2022   FOTO 56   L Hip abd 3+/5   L SLS 2"       Manuals    Passive Knee/Hip ROM  5 min np                     Neuro Re-Ed      Tandem      SLS  3x 20 sec, BL 3x20" B   Standing Hip Flexion  2x10, BL 2x10 B                           Ther Ex      Nustep 8' L5 8' L5 8' L5   SLR Flexion  2x10, BL 2x10 B   SLR abd  1x10 2x10 B   Clamshells HL  2x10 BL, manual resistance 3x10 gtb                           Ther Activity      STS  2x8, with BTB at knees 2x8, with BTB at knees   FSU  2x5, 6" step 2x5 B 6"   LSU      Gait Training                  Modalities

## 2022-08-31 ENCOUNTER — APPOINTMENT (OUTPATIENT)
Dept: PHYSICAL THERAPY | Facility: REHABILITATION | Age: 82
End: 2022-08-31
Payer: MEDICARE

## 2022-09-13 DIAGNOSIS — I10 ESSENTIAL HYPERTENSION: ICD-10-CM

## 2022-09-13 RX ORDER — AMLODIPINE BESYLATE 5 MG/1
5 TABLET ORAL DAILY
Qty: 90 TABLET | Refills: 1 | Status: SHIPPED | OUTPATIENT
Start: 2022-09-13

## 2022-09-13 NOTE — TELEPHONE ENCOUNTER
Refill Request (patient is out of medication)     Amlodipine 5mg     Pharmacy: [address] [place], [place] [address]way: 7/5/22  NA: 9/20/22

## 2022-11-03 ENCOUNTER — OFFICE VISIT (OUTPATIENT)
Dept: CARDIOLOGY CLINIC | Facility: CLINIC | Age: 82
End: 2022-11-03

## 2022-11-03 VITALS
BODY MASS INDEX: 26.31 KG/M2 | HEIGHT: 62 IN | DIASTOLIC BLOOD PRESSURE: 66 MMHG | OXYGEN SATURATION: 97 % | HEART RATE: 65 BPM | WEIGHT: 143 LBS | SYSTOLIC BLOOD PRESSURE: 132 MMHG

## 2022-11-03 DIAGNOSIS — R55 SYNCOPE, UNSPECIFIED SYNCOPE TYPE: ICD-10-CM

## 2022-11-03 DIAGNOSIS — I35.1 AORTIC VALVE INSUFFICIENCY, ETIOLOGY OF CARDIAC VALVE DISEASE UNSPECIFIED: ICD-10-CM

## 2022-11-03 DIAGNOSIS — R94.31 ABNORMAL EKG: Primary | ICD-10-CM

## 2022-11-03 NOTE — PROGRESS NOTES
Cardiology     Clinic Visit Note  Deep Splinter 79 y o  female   MRN: [id number]    Assessment and Plan      1  Abnormal ECG - Worsening ST depressions and T wave inversions - noted on last test myocardial perfusion scan negative - no further episodes of chest pain  -     NM myocardial perfusion spect (rx stress and/or rest); Future     2  Syncope, unspecified syncope type - History is now of a mechanical fall - no evidence of cardiogenic syncope  Echo with mild AI n other abnormalities can be repeat in 3 years       3  Bradycardia - Does have history of bradycardia  zio patch showed very short episodes of SVT longest approximately 13 beats - but overall heart rate was average of 59 with lowest heart rate of 40 - no further dizziness she reports  Chief Complaint: follow up  Subjective     History of Present Illness: This is a 79year old women with a PMH of GERD, HTN, HLD who presents today follow up  She was admitted to the hospital in June of 2022 secondary to a fall  The patient at that time could not remember what happened but was found by her granddaughter next to a chair on the floor  She had complained of intermittent palpitations at home as well  EKG at that time was notable for ST depressions in the inferior leads and anterolateral leads  Troponin negative X 3  CBC and BMP were unremarkable  CT head was negative  An echocardiogram was performed that showed mild AI, grade I DD, and normal LVEF  While the fall was originally of undetermined etiology last visit more history was gathered and the patient had remembered try to stand on a chair to clean a window and slipped - this was confirmed that she had a mechanical fall  Last visit she complained of BRUNER that was getting worse as well as palpitations when she exerted herself  She had a zio patch placed that showed sinus rhythm with BBB  3 SVT episodes - longest lasting 13 betas  Rare VE  SVE triplets were rare   And isolated SVE were occasional   She had a myocardial perfusion scan that was negative as well  Echo showed mild AI       She reports no chest pain  No palpitations  She is not certain all the meds she is taking  She denies any syncope or falls  She states she otherwise feels well and is feeling better  Review of Systems   Constitutional: Negative for activity change  Respiratory: Negative for apnea  Cardiovascular: Negative for chest pain  Musculoskeletal: Positive for back pain  Neurological: Negative for dizziness  Psychiatric/Behavioral: Negative for agitation           Current Outpatient Medications:   •  acetaminophen (TYLENOL) 325 mg tablet, , Disp: , Rfl:   •  amLODIPine (NORVASC) 5 mg tablet, Take 1 tablet (5 mg total) by mouth daily, Disp: 90 tablet, Rfl: 1  •  Ascorbic Acid (vitamin C) 1000 MG tablet, Take 1,000 mg by mouth daily, Disp: , Rfl:   •  aspirin (ECOTRIN LOW STRENGTH) 81 mg EC tablet, Take 81 mg by mouth daily , Disp: , Rfl:   •  cholecalciferol (VITAMIN D3) 25 mcg (1,000 units) tablet, Take 1,000 Units by mouth daily, Disp: , Rfl:   •  citalopram (CeleXA) 20 mg tablet, TAKE 1/2 OR 1 TAB DAILY AS DIRECTED BY DOCTOR, Disp: 90 tablet, Rfl: 3  •  dorzolamide (TRUSOPT) 2 % ophthalmic solution, , Disp: , Rfl:   •  Lumigan 0 01 % ophthalmic drops, , Disp: , Rfl:   •  Multiple Vitamin tablet, Take by mouth, Disp: , Rfl:   •  olmesartan (BENICAR) 20 mg tablet, Take 1 tablet (20 mg total) by mouth 2 (two) times a day, Disp: 180 tablet, Rfl: 0  •  omeprazole (PriLOSEC) 20 mg delayed release capsule, , Disp: , Rfl:   •  rosuvastatin (CRESTOR) 5 mg tablet, TAKE 1 TABLET (5 MG TOTAL) BY MOUTH DAILY AT BEDTIME, Disp: 90 tablet, Rfl: 1  •  sodium chloride (OCEAN) 0 65 % nasal spray, 1 spray into each nostril every 2 (two) hours while awake, Disp: 60 mL, Rfl: 1  •  Travatan Z 0 004 % ophthalmic solution, , Disp: , Rfl:   Past Medical History:   Diagnosis Date   • Aortic insufficiency    • Cee's esophagus    • Gastroesophageal reflux disease    • Glaucoma    • Hypercholesterolemia    • Hyperlipidemia    • Hypertension    • Kidney stones 2009   • Low back pain    • Major depressive disorder    • Mitral valve insufficiency    • Osteoporosis    • Osteoporosis    • Vitamin D deficiency      Past Surgical History:   Procedure Laterality Date   • COLONOSCOPY  06/02/2011    Normal    • CYSTOSCOPY  2009    Polyp, stones   • MAMMO (HISTORICAL)  09/19/2016    Negative    • OTHER SURGICAL HISTORY      Infection in arm      Social History     Socioeconomic History   • Marital status: /Civil Union     Spouse name: Not on file   • Number of children: Not on file   • Years of education: Not on file   • Highest education level: Not on file   Occupational History   • Not on file   Tobacco Use   • Smoking status: Never Smoker   • Smokeless tobacco: Never Used   Vaping Use   • Vaping Use: Never used   Substance and Sexual Activity   • Alcohol use: Not Currently   • Drug use: No   • Sexual activity: Not Currently   Other Topics Concern   • Not on file   Social History Narrative   • Not on file     Social Determinants of Health     Financial Resource Strain: Not on file   Food Insecurity: Not on file   Transportation Needs: Not on file   Physical Activity: Not on file   Stress: Not on file   Social Connections: Not on file   Intimate Partner Violence: Not on file   Housing Stability: Not on file     Family History   Problem Relation Age of Onset   • Cancer Mother 80        unknown type of abdominal cancer   • No Known Problems Father    • No Known Problems Sister    • No Known Problems Daughter    • No Known Problems Maternal Grandmother    • No Known Problems Maternal Grandfather    • No Known Problems Paternal Grandmother    • No Known Problems Paternal Grandfather    • No Known Problems Daughter    • No Known Problems Son    • Lung cancer Brother 79   • Lung cancer Brother 72     No Known Allergies    Objective     Vitals: 11/03/22 1057   BP: 132/66   BP Location: Right arm   Patient Position: Sitting   Cuff Size: Large   Pulse: 65   SpO2: 97%   Weight: 64 9 kg (143 lb)   Height: 5' 2" (1 575 m)       Physical exam:     Physical Exam  Cardiovascular:      Rate and Rhythm: Normal rate and regular rhythm  Pulmonary:      Effort: Pulmonary effort is normal  No respiratory distress  Abdominal:      General: Abdomen is flat  There is no distension  Neurological:      General: No focal deficit present  Mental Status: She is alert  Psychiatric:         Mood and Affect: Mood normal            ==  PLEASE NOTE:  This encounter was completed utilizing the [employer] Direct Speech Voice Recognition Software  Grammatical errors, random word insertions, pronoun errors and incomplete sentences are occasional consequences of the system due to software limitations, ambient noise and hardware issues  These may be missed by proof reading prior to affixing electronic signature  Any questions or concerns about the content, text or information contained within the body of this dictation should be directly addressed to the physician for clarification  Please do not hesitate to call me directly if you have any any questions or concerns

## 2022-11-29 ENCOUNTER — TELEPHONE (OUTPATIENT)
Dept: INTERNAL MEDICINE CLINIC | Facility: CLINIC | Age: 82
End: 2022-11-29

## 2022-11-30 ENCOUNTER — OFFICE VISIT (OUTPATIENT)
Dept: INTERNAL MEDICINE CLINIC | Facility: CLINIC | Age: 82
End: 2022-11-30

## 2022-11-30 VITALS
SYSTOLIC BLOOD PRESSURE: 130 MMHG | HEIGHT: 62 IN | WEIGHT: 142 LBS | TEMPERATURE: 97.5 F | BODY MASS INDEX: 26.13 KG/M2 | DIASTOLIC BLOOD PRESSURE: 60 MMHG | HEART RATE: 72 BPM | OXYGEN SATURATION: 96 %

## 2022-11-30 DIAGNOSIS — F34.1 DYSTHYMIA: ICD-10-CM

## 2022-11-30 DIAGNOSIS — I10 ESSENTIAL HYPERTENSION: Primary | ICD-10-CM

## 2022-11-30 DIAGNOSIS — E55.9 VITAMIN D DEFICIENCY: ICD-10-CM

## 2022-11-30 RX ORDER — AMLODIPINE BESYLATE 2.5 MG/1
2.5 TABLET ORAL DAILY
Qty: 90 TABLET | Refills: 1 | Status: SHIPPED | OUTPATIENT
Start: 2022-11-30

## 2022-11-30 RX ORDER — OLMESARTAN MEDOXOMIL 20 MG/1
20 TABLET ORAL 2 TIMES DAILY
Qty: 180 TABLET | Refills: 1 | Status: SHIPPED | OUTPATIENT
Start: 2022-11-30

## 2022-11-30 RX ORDER — AMLODIPINE BESYLATE 5 MG/1
5 TABLET ORAL DAILY
Qty: 90 TABLET | Refills: 1 | Status: CANCELLED | OUTPATIENT
Start: 2022-11-30

## 2022-11-30 NOTE — PROGRESS NOTES
Assessment/Plan:           1  Essential hypertension  Comments:  Amlodipine was decreased to 2 5 mg daily  Orders:  -     olmesartan (BENICAR) 20 mg tablet; Take 1 tablet (20 mg total) by mouth 2 (two) times a day  -     amLODIPine (NORVASC) 2 5 mg tablet; Take 1 tablet (2 5 mg total) by mouth daily  -     CBC and differential; Future; Expected date: 12/05/2022  -     Comprehensive metabolic panel; Future; Expected date: 12/05/2022  -     UA (URINE) with reflex to Scope; Future; Expected date: 12/05/2022    2  Dysthymia  Comments:  Continue citalopram     3  Vitamin D deficiency           1  Essential hypertension         No problem-specific Assessment & Plan notes found for this encounter  Subjective:      Patient ID: Alex Johns is a 79 y o  female  HPI    The following portions of the patient's history were reviewed and updated as appropriate: She  has a past medical history of Aortic insufficiency, Cee's esophagus, Gastroesophageal reflux disease, Glaucoma, Hypercholesterolemia, Hyperlipidemia, Hypertension, Kidney stones (2009), Low back pain, Major depressive disorder, Mitral valve insufficiency, Osteoporosis, Osteoporosis, and Vitamin D deficiency  She   Patient Active Problem List    Diagnosis Date Noted   • Fall 06/16/2022   • Pancreatic cyst 03/19/2021   • Vitamin D deficiency 03/19/2021   • Dysthymia 03/19/2021   • Gastroesophageal reflux disease without esophagitis 03/19/2021   • Essential hypertension 01/19/2018   • Hyperlipemia 01/19/2018   • Anxiety 01/19/2018   • Bradycardia 01/19/2018   • Dizziness 01/19/2018     She  has a past surgical history that includes Cystoscopy (2009); Other surgical history; Colonoscopy (06/02/2011); and Mammo (historical) (09/19/2016)    Her family history includes Cancer (age of onset: 80) in her mother; Lung cancer (age of onset: 72) in her brother; Lung cancer (age of onset: 79) in her brother; No Known Problems in her daughter, daughter, father, maternal grandfather, maternal grandmother, paternal grandfather, paternal grandmother, sister, and son  She  reports that she has never smoked  She has never used smokeless tobacco  She reports that she does not currently use alcohol  She reports that she does not use drugs  Current Outpatient Medications   Medication Sig Dispense Refill   • acetaminophen (TYLENOL) 325 mg tablet As needed body aches     • amLODIPine (NORVASC) 2 5 mg tablet Take 1 tablet (2 5 mg total) by mouth daily 90 tablet 1   • Ascorbic Acid (vitamin C) 1000 MG tablet Take 1,000 mg by mouth daily     • aspirin (ECOTRIN LOW STRENGTH) 81 mg EC tablet Take 81 mg by mouth daily Sometimes     • cholecalciferol (VITAMIN D3) 25 mcg (1,000 units) tablet Take 1,000 Units by mouth daily     • citalopram (CeleXA) 20 mg tablet TAKE 1/2 OR 1 TAB DAILY AS DIRECTED BY DOCTOR 90 tablet 3   • dorzolamide (TRUSOPT) 2 % ophthalmic solution      • Lumigan 0 01 % ophthalmic drops      • Multiple Vitamin tablet Take by mouth     • olmesartan (BENICAR) 20 mg tablet Take 1 tablet (20 mg total) by mouth 2 (two) times a day 180 tablet 1   • omeprazole (PriLOSEC) 20 mg delayed release capsule      • rosuvastatin (CRESTOR) 5 mg tablet TAKE 1 TABLET (5 MG TOTAL) BY MOUTH DAILY AT BEDTIME (Patient taking differently: Take 5 mg by mouth daily at bedtime Sometimes) 90 tablet 1   • sodium chloride (OCEAN) 0 65 % nasal spray 1 spray into each nostril every 2 (two) hours while awake (Patient taking differently: 1 spray into each nostril every 2 (two) hours while awake Sometimes) 60 mL 1   • Travatan Z 0 004 % ophthalmic solution        No current facility-administered medications for this visit       Current Outpatient Medications on File Prior to Visit   Medication Sig   • acetaminophen (TYLENOL) 325 mg tablet As needed body aches   • Ascorbic Acid (vitamin C) 1000 MG tablet Take 1,000 mg by mouth daily   • aspirin (ECOTRIN LOW STRENGTH) 81 mg EC tablet Take 81 mg by mouth daily Sometimes   • cholecalciferol (VITAMIN D3) 25 mcg (1,000 units) tablet Take 1,000 Units by mouth daily   • citalopram (CeleXA) 20 mg tablet TAKE 1/2 OR 1 TAB DAILY AS DIRECTED BY DOCTOR   • dorzolamide (TRUSOPT) 2 % ophthalmic solution    • Lumigan 0 01 % ophthalmic drops    • Multiple Vitamin tablet Take by mouth   • omeprazole (PriLOSEC) 20 mg delayed release capsule    • rosuvastatin (CRESTOR) 5 mg tablet TAKE 1 TABLET (5 MG TOTAL) BY MOUTH DAILY AT BEDTIME (Patient taking differently: Take 5 mg by mouth daily at bedtime Sometimes)   • sodium chloride (OCEAN) 0 65 % nasal spray 1 spray into each nostril every 2 (two) hours while awake (Patient taking differently: 1 spray into each nostril every 2 (two) hours while awake Sometimes)   • Travatan Z 0 004 % ophthalmic solution    • [DISCONTINUED] amLODIPine (NORVASC) 5 mg tablet Take 1 tablet (5 mg total) by mouth daily   • [DISCONTINUED] olmesartan (BENICAR) 20 mg tablet Take 1 tablet (20 mg total) by mouth 2 (two) times a day     No current facility-administered medications on file prior to visit  She has No Known Allergies       Review of Systems   Constitutional: Negative for appetite change, chills, fatigue and fever  HENT: Negative for sore throat and trouble swallowing  Eyes: Negative for redness  Respiratory: Negative for shortness of breath  Cardiovascular: Negative for chest pain and palpitations  Gastrointestinal: Negative for abdominal pain, constipation and diarrhea  Genitourinary: Negative for dysuria and hematuria  Musculoskeletal: Negative for back pain and neck pain  Skin: Negative for rash  Neurological: Negative for seizures, weakness and headaches  Hematological: Negative for adenopathy  Psychiatric/Behavioral: Negative for confusion  The patient is not nervous/anxious            Objective:      /60   Pulse 72   Temp 97 5 °F (36 4 °C) (Temporal)   Ht 5' 2" (1 575 m)   Wt 64 4 kg (142 lb)   SpO2 96%   BMI 25 97 kg/m²     Results Reviewed     None          No results found for this or any previous visit (from the past 1344 hour(s))  Physical Exam  Constitutional:       General: She is not in acute distress  Appearance: Normal appearance  HENT:      Head: Normocephalic and atraumatic  Right Ear: Tympanic membrane normal       Left Ear: Tympanic membrane normal       Nose: Nose normal       Mouth/Throat:      Mouth: Mucous membranes are moist    Eyes:      Extraocular Movements: Extraocular movements intact  Pupils: Pupils are equal, round, and reactive to light  Cardiovascular:      Rate and Rhythm: Normal rate and regular rhythm  Pulses: Normal pulses  Heart sounds: Normal heart sounds  No murmur heard  No friction rub  Pulmonary:      Effort: Pulmonary effort is normal  No respiratory distress  Breath sounds: Normal breath sounds  No wheezing  Abdominal:      General: Abdomen is flat  Bowel sounds are normal  There is no distension  Palpations: Abdomen is soft  There is no mass  Tenderness: There is no abdominal tenderness  There is no guarding  Musculoskeletal:         General: Normal range of motion  Cervical back: Normal range of motion  Skin:     General: Skin is warm  Neurological:      General: No focal deficit present  Mental Status: She is alert and oriented to person, place, and time  Mental status is at baseline  Cranial Nerves: No cranial nerve deficit  Psychiatric:         Mood and Affect: Mood normal          Behavior: Behavior normal        BMI Counseling: Body mass index is 25 97 kg/m²  The BMI is above normal  Nutrition recommendations include reducing portion sizes  Falls Plan of Care: Balance, strength, and gait training instructions were provided

## 2023-01-06 ENCOUNTER — APPOINTMENT (OUTPATIENT)
Dept: LAB | Facility: CLINIC | Age: 83
End: 2023-01-06

## 2023-01-06 DIAGNOSIS — I10 ESSENTIAL HYPERTENSION: ICD-10-CM

## 2023-01-06 LAB
ALBUMIN SERPL BCP-MCNC: 3.5 G/DL (ref 3.5–5)
ALP SERPL-CCNC: 60 U/L (ref 46–116)
ALT SERPL W P-5'-P-CCNC: 18 U/L (ref 12–78)
ANION GAP SERPL CALCULATED.3IONS-SCNC: 5 MMOL/L (ref 4–13)
AST SERPL W P-5'-P-CCNC: 15 U/L (ref 5–45)
BACTERIA UR QL AUTO: ABNORMAL /HPF
BASOPHILS # BLD AUTO: 0.06 THOUSANDS/ÂΜL (ref 0–0.1)
BASOPHILS NFR BLD AUTO: 1 % (ref 0–1)
BILIRUB SERPL-MCNC: 0.54 MG/DL (ref 0.2–1)
BILIRUB UR QL STRIP: NEGATIVE
BUN SERPL-MCNC: 16 MG/DL (ref 5–25)
CALCIUM SERPL-MCNC: 9.2 MG/DL (ref 8.3–10.1)
CHLORIDE SERPL-SCNC: 110 MMOL/L (ref 96–108)
CLARITY UR: CLEAR
CO2 SERPL-SCNC: 25 MMOL/L (ref 21–32)
COLOR UR: ABNORMAL
CREAT SERPL-MCNC: 0.86 MG/DL (ref 0.6–1.3)
EOSINOPHIL # BLD AUTO: 0.09 THOUSAND/ÂΜL (ref 0–0.61)
EOSINOPHIL NFR BLD AUTO: 2 % (ref 0–6)
ERYTHROCYTE [DISTWIDTH] IN BLOOD BY AUTOMATED COUNT: 12.6 % (ref 11.6–15.1)
GFR SERPL CREATININE-BSD FRML MDRD: 62 ML/MIN/1.73SQ M
GLUCOSE P FAST SERPL-MCNC: 125 MG/DL (ref 65–99)
GLUCOSE UR STRIP-MCNC: NEGATIVE MG/DL
HCT VFR BLD AUTO: 38.8 % (ref 34.8–46.1)
HGB BLD-MCNC: 12.9 G/DL (ref 11.5–15.4)
HGB UR QL STRIP.AUTO: ABNORMAL
IMM GRANULOCYTES # BLD AUTO: 0.01 THOUSAND/UL (ref 0–0.2)
IMM GRANULOCYTES NFR BLD AUTO: 0 % (ref 0–2)
KETONES UR STRIP-MCNC: NEGATIVE MG/DL
LEUKOCYTE ESTERASE UR QL STRIP: NEGATIVE
LYMPHOCYTES # BLD AUTO: 1.65 THOUSANDS/ÂΜL (ref 0.6–4.47)
LYMPHOCYTES NFR BLD AUTO: 38 % (ref 14–44)
MCH RBC QN AUTO: 32.4 PG (ref 26.8–34.3)
MCHC RBC AUTO-ENTMCNC: 33.2 G/DL (ref 31.4–37.4)
MCV RBC AUTO: 98 FL (ref 82–98)
MONOCYTES # BLD AUTO: 0.27 THOUSAND/ÂΜL (ref 0.17–1.22)
MONOCYTES NFR BLD AUTO: 6 % (ref 4–12)
MUCOUS THREADS UR QL AUTO: ABNORMAL
NEUTROPHILS # BLD AUTO: 2.26 THOUSANDS/ÂΜL (ref 1.85–7.62)
NEUTS SEG NFR BLD AUTO: 53 % (ref 43–75)
NITRITE UR QL STRIP: NEGATIVE
NON-SQ EPI CELLS URNS QL MICRO: ABNORMAL /HPF
NRBC BLD AUTO-RTO: 0 /100 WBCS
PH UR STRIP.AUTO: 5.5 [PH]
PLATELET # BLD AUTO: 216 THOUSANDS/UL (ref 149–390)
PMV BLD AUTO: 10.2 FL (ref 8.9–12.7)
POTASSIUM SERPL-SCNC: 4.5 MMOL/L (ref 3.5–5.3)
PROT SERPL-MCNC: 7.5 G/DL (ref 6.4–8.4)
PROT UR STRIP-MCNC: NEGATIVE MG/DL
RBC # BLD AUTO: 3.98 MILLION/UL (ref 3.81–5.12)
RBC #/AREA URNS AUTO: ABNORMAL /HPF
SODIUM SERPL-SCNC: 140 MMOL/L (ref 135–147)
SP GR UR STRIP.AUTO: 1.02 (ref 1–1.03)
UROBILINOGEN UR STRIP-ACNC: <2 MG/DL
WBC # BLD AUTO: 4.34 THOUSAND/UL (ref 4.31–10.16)
WBC #/AREA URNS AUTO: ABNORMAL /HPF

## 2023-02-06 ENCOUNTER — OFFICE VISIT (OUTPATIENT)
Dept: RHEUMATOLOGY | Facility: CLINIC | Age: 83
End: 2023-02-06

## 2023-02-06 VITALS
SYSTOLIC BLOOD PRESSURE: 124 MMHG | WEIGHT: 142 LBS | HEIGHT: 62 IN | BODY MASS INDEX: 26.13 KG/M2 | DIASTOLIC BLOOD PRESSURE: 58 MMHG

## 2023-02-06 DIAGNOSIS — M79.10 MYALGIA: ICD-10-CM

## 2023-02-06 DIAGNOSIS — M79.605 LEG PAIN, ANTERIOR, LEFT: Primary | ICD-10-CM

## 2023-02-06 DIAGNOSIS — M81.0 AGE-RELATED OSTEOPOROSIS WITHOUT CURRENT PATHOLOGICAL FRACTURE: ICD-10-CM

## 2023-02-06 NOTE — PROGRESS NOTES
Assessment and Plan:   Myalgias, arthralgias  Inflammatory w/u negative  Anti-inflammatory diet/exercise/weight control  Increase CV fitness/aerobic activity  Patient to continue to monitor symptoms  Topical NSAIDs/analgesics PRN joint pain  Continue to encourage PT for muscle strengthening, toning and ROM  She will be visiting her chiropractor who has helped her in the past   Continue calcium and vitamin D for bone health  Resistance activity for bone health  F/u in 1 yr or sooner if necessary      Rheumatic Disease Summary  As above    Here for f/u visit  She still has some left LE myalgias and pain on ROM  Also with some mild malaise  She takes Tylenol with some pain relief  No f/c/ns  No constitutional symptoms  All labs reviewed and normal CK, esr/crp  The following portions of the patient's history were reviewed and updated as appropriate: allergies, current medications, past family history, past medical history, past social history, past surgical history and problem list     Review of Systems:   Review of Systems   Constitutional: Negative for fatigue  HENT: Negative for mouth sores  Eyes: Negative for pain  Respiratory: Negative for shortness of breath  Cardiovascular: Negative for leg swelling  Musculoskeletal: Positive for myalgias  Negative for arthralgias and joint swelling  Skin: Negative for rash  Neurological: Negative for weakness  Hematological: Negative for adenopathy  Psychiatric/Behavioral: Negative for sleep disturbance         Home Medications:    Current Outpatient Medications:   •  acetaminophen (TYLENOL) 325 mg tablet, As needed body aches, Disp: , Rfl:   •  amLODIPine (NORVASC) 2 5 mg tablet, Take 1 tablet (2 5 mg total) by mouth daily, Disp: 90 tablet, Rfl: 1  •  Ascorbic Acid (vitamin C) 1000 MG tablet, Take 1,000 mg by mouth daily, Disp: , Rfl:   •  aspirin (ECOTRIN LOW STRENGTH) 81 mg EC tablet, Take 81 mg by mouth daily Sometimes, Disp: , Rfl:   • cholecalciferol (VITAMIN D3) 25 mcg (1,000 units) tablet, Take 1,000 Units by mouth daily, Disp: , Rfl:   •  citalopram (CeleXA) 20 mg tablet, TAKE 1/2 OR 1 TAB DAILY AS DIRECTED BY DOCTOR, Disp: 90 tablet, Rfl: 3  •  dorzolamide (TRUSOPT) 2 % ophthalmic solution, , Disp: , Rfl:   •  Lumigan 0 01 % ophthalmic drops, , Disp: , Rfl:   •  Multiple Vitamin tablet, Take by mouth, Disp: , Rfl:   •  olmesartan (BENICAR) 20 mg tablet, Take 1 tablet (20 mg total) by mouth 2 (two) times a day, Disp: 180 tablet, Rfl: 1  •  omeprazole (PriLOSEC) 20 mg delayed release capsule, , Disp: , Rfl:   •  rosuvastatin (CRESTOR) 5 mg tablet, TAKE 1 TABLET (5 MG TOTAL) BY MOUTH DAILY AT BEDTIME (Patient taking differently: Take 5 mg by mouth daily at bedtime Sometimes), Disp: 90 tablet, Rfl: 1  •  sodium chloride (OCEAN) 0 65 % nasal spray, 1 spray into each nostril every 2 (two) hours while awake (Patient taking differently: 1 spray into each nostril every 2 (two) hours while awake Sometimes), Disp: 60 mL, Rfl: 1  •  Travatan Z 0 004 % ophthalmic solution, , Disp: , Rfl:     Objective: There were no vitals filed for this visit  Physical Exam  Constitutional:       General: She is not in acute distress  HENT:      Head: Normocephalic and atraumatic  Eyes:      Conjunctiva/sclera: Conjunctivae normal    Cardiovascular:      Rate and Rhythm: Normal rate and regular rhythm  Heart sounds: S1 normal and S2 normal      No friction rub  Pulmonary:      Effort: Pulmonary effort is normal  No respiratory distress  Breath sounds: Normal breath sounds  No wheezing, rhonchi or rales  Musculoskeletal:      Cervical back: Neck supple  Skin:     Coloration: Skin is not pale  Neurological:      Mental Status: She is alert  Mental status is at baseline  Psychiatric:         Mood and Affect: Mood normal          Behavior: Behavior normal          Reviewed labs and imaging  Imaging:   No results found      Labs:   Appointment on 01/06/2023   Component Date Value Ref Range Status   • WBC 01/06/2023 4 34  4 31 - 10 16 Thousand/uL Final   • RBC 01/06/2023 3 98  3 81 - 5 12 Million/uL Final   • Hemoglobin 01/06/2023 12 9  11 5 - 15 4 g/dL Final   • Hematocrit 01/06/2023 38 8  34 8 - 46 1 % Final   • MCV 01/06/2023 98  82 - 98 fL Final   • MCH 01/06/2023 32 4  26 8 - 34 3 pg Final   • MCHC 01/06/2023 33 2  31 4 - 37 4 g/dL Final   • RDW 01/06/2023 12 6  11 6 - 15 1 % Final   • MPV 01/06/2023 10 2  8 9 - 12 7 fL Final   • Platelets 46/98/1112 216  149 - 390 Thousands/uL Final   • nRBC 01/06/2023 0  /100 WBCs Final   • Neutrophils Relative 01/06/2023 53  43 - 75 % Final   • Immat GRANS % 01/06/2023 0  0 - 2 % Final   • Lymphocytes Relative 01/06/2023 38  14 - 44 % Final   • Monocytes Relative 01/06/2023 6  4 - 12 % Final   • Eosinophils Relative 01/06/2023 2  0 - 6 % Final   • Basophils Relative 01/06/2023 1  0 - 1 % Final   • Neutrophils Absolute 01/06/2023 2 26  1 85 - 7 62 Thousands/µL Final   • Immature Grans Absolute 01/06/2023 0 01  0 00 - 0 20 Thousand/uL Final   • Lymphocytes Absolute 01/06/2023 1 65  0 60 - 4 47 Thousands/µL Final   • Monocytes Absolute 01/06/2023 0 27  0 17 - 1 22 Thousand/µL Final   • Eosinophils Absolute 01/06/2023 0 09  0 00 - 0 61 Thousand/µL Final   • Basophils Absolute 01/06/2023 0 06  0 00 - 0 10 Thousands/µL Final   • Sodium 01/06/2023 140  135 - 147 mmol/L Final   • Potassium 01/06/2023 4 5  3 5 - 5 3 mmol/L Final   • Chloride 01/06/2023 110 (H)  96 - 108 mmol/L Final   • CO2 01/06/2023 25  21 - 32 mmol/L Final   • ANION GAP 01/06/2023 5  4 - 13 mmol/L Final   • BUN 01/06/2023 16  5 - 25 mg/dL Final   • Creatinine 01/06/2023 0 86  0 60 - 1 30 mg/dL Final    Standardized to IDMS reference method   • Glucose, Fasting 01/06/2023 125 (H)  65 - 99 mg/dL Final    Specimen collection should occur prior to Sulfasalazine administration due to the potential for falsely depressed results   Specimen collection should occur prior to Sulfapyridine administration due to the potential for falsely elevated results  • Calcium 01/06/2023 9 2  8 3 - 10 1 mg/dL Final   • AST 01/06/2023 15  5 - 45 U/L Final    Specimen collection should occur prior to Sulfasalazine administration due to the potential for falsely depressed results  • ALT 01/06/2023 18  12 - 78 U/L Final    Specimen collection should occur prior to Sulfasalazine and/or Sulfapyridine administration due to the potential for falsely depressed results  • Alkaline Phosphatase 01/06/2023 60  46 - 116 U/L Final   • Total Protein 01/06/2023 7 5  6 4 - 8 4 g/dL Final   • Albumin 01/06/2023 3 5  3 5 - 5 0 g/dL Final   • Total Bilirubin 01/06/2023 0 54  0 20 - 1 00 mg/dL Final    Use of this assay is not recommended for patients undergoing treatment with eltrombopag due to the potential for falsely elevated results     • eGFR 01/06/2023 62  ml/min/1 73sq m Final   • Color, UA 01/06/2023 Light Yellow   Final   • Clarity, UA 01/06/2023 Clear   Final   • Specific Gravity, UA 01/06/2023 1 018  1 003 - 1 030 Final   • pH, UA 01/06/2023 5 5  4 5, 5 0, 5 5, 6 0, 6 5, 7 0, 7 5, 8 0 Final   • Leukocytes, UA 01/06/2023 Negative  Negative Final   • Nitrite, UA 01/06/2023 Negative  Negative Final   • Protein, UA 01/06/2023 Negative  Negative mg/dl Final   • Glucose, UA 01/06/2023 Negative  Negative mg/dl Final   • Ketones, UA 01/06/2023 Negative  Negative mg/dl Final   • Urobilinogen, UA 01/06/2023 <2 0  <2 0 mg/dl mg/dl Final   • Bilirubin, UA 01/06/2023 Negative  Negative Final   • Occult Blood, UA 01/06/2023 Trace (A)  Negative Final   • RBC, UA 01/06/2023 1-2  None Seen, 1-2 /hpf Final   • WBC, UA 01/06/2023 1-2  None Seen, 1-2 /hpf Final   • Epithelial Cells 01/06/2023 Occasional  None Seen, Occasional /hpf Final   • Bacteria, UA 01/06/2023 None Seen  None Seen, Occasional /hpf Final   • MUCUS THREADS 01/06/2023 Moderate (A)  None Seen Final   Appointment on 08/03/2022   Component Date Value Ref Range Status   • Total CK 08/03/2022 110  26 - 192 U/L Final   • Sodium 08/03/2022 138  135 - 147 mmol/L Final   • Potassium 08/03/2022 3 9  3 5 - 5 3 mmol/L Final   • Chloride 08/03/2022 109 (H)  96 - 108 mmol/L Final   • CO2 08/03/2022 22  21 - 32 mmol/L Final   • ANION GAP 08/03/2022 7  4 - 13 mmol/L Final   • BUN 08/03/2022 14  5 - 25 mg/dL Final   • Creatinine 08/03/2022 0 86  0 60 - 1 30 mg/dL Final    Standardized to IDMS reference method   • Glucose, Fasting 08/03/2022 151 (H)  65 - 99 mg/dL Final    Specimen collection should occur prior to Sulfasalazine administration due to the potential for falsely depressed results  Specimen collection should occur prior to Sulfapyridine administration due to the potential for falsely elevated results  • Calcium 08/03/2022 9 0  8 3 - 10 1 mg/dL Final   • Corrected Calcium 08/03/2022 9 6  8 3 - 10 1 mg/dL Final   • AST 08/03/2022 21  5 - 45 U/L Final    Specimen collection should occur prior to Sulfasalazine administration due to the potential for falsely depressed results  • ALT 08/03/2022 26  12 - 78 U/L Final    Specimen collection should occur prior to Sulfasalazine and/or Sulfapyridine administration due to the potential for falsely depressed results  • Alkaline Phosphatase 08/03/2022 68  46 - 116 U/L Final   • Total Protein 08/03/2022 7 4  6 4 - 8 4 g/dL Final   • Albumin 08/03/2022 3 3 (L)  3 5 - 5 0 g/dL Final   • Total Bilirubin 08/03/2022 0 39  0 20 - 1 00 mg/dL Final    Use of this assay is not recommended for patients undergoing treatment with eltrombopag due to the potential for falsely elevated results     • eGFR 08/03/2022 63  ml/min/1 73sq m Final   • CRP 08/03/2022 <3 0  <3 0 mg/L Final   • Sed Rate 08/03/2022 23  0 - 29 mm/hour Final   • TSH 3RD GENERATON 08/03/2022 1 430  0 450 - 4 500 uIU/mL Final    The recommended reference ranges for TSH during pregnancy are as follows:   First trimester 0 1 to 2 5 uIU/mL   Second trimester  0 2 to 3 0 uIU/mL   Third trimester 0 3 to 3 0 uIU/m    Note: Normal ranges may not apply to patients who are transgender, non-binary, or whose legal sex, sex at birth, and gender identity differ  Adult TSH (3rd generation) reference range follows the recommended guidelines of the [employer], January, 2020  Hospital Outpatient Visit on 07/15/2022   Component Date Value Ref Range Status   • Protocol Name 07/15/2022 Solange De Santiago   Final   • Time In Exercise Phase 07/15/2022 00:03:00   Final   • MAX  SYSTOLIC BP 96/65/5491 872  mmHg Final   • Max Diastolic Bp 20/31/4806 78  mmHg Final   • Max Heart Rate 07/15/2022 99  BPM Final   • Max Predicted Heart Rate 07/15/2022 138  BPM Final   • Reason for Termination 07/15/2022 TEST COMPLETE       Final   • Test Indication 07/15/2022 Dyspnea   Final   • Target Hr Formular 07/15/2022 (220 - Age)*100%   Final   • Chest Pain Statement 07/15/2022 none   Final   Hospital Outpatient Visit on 07/15/2022   Component Date Value Ref Range Status   • Baseline HR 07/15/2022 52  bpm Final   • Baseline BP 07/15/2022 180/78  mmHg Final   • O2 sat rest 07/15/2022 96  % Final   • Stress peak HR 07/15/2022 99  bpm Final   • Post peak BP 07/15/2022 140  mmHg Final   • Rate Pressure Product 07/15/2022 13,860 0   Final   • O2 sat peak 07/15/2022 98  % Final   • Recovery HR 07/15/2022 66  bpm Final   • Recovery BP 07/15/2022 160/70  mmHg Final   • O2 sat recovery 07/15/2022 98  % Final   • Max HR 07/15/2022 99  bpm Final   • Max HR Percent 07/15/2022 71  % Final   • Exercise duration (min) 07/15/2022 3  min Final   • Exercise duration (sec) 07/15/2022 0  sec Final   • Angina Index 07/15/2022 0   Final   • Rest Nuclear Isotope Dose 07/15/2022 9 76  mCi Final   • Stress Nuclear Isotope Dose 07/15/2022 31 60  mCi Final   • Mcgrath Treadmill Score 07/15/2022 -5   Final   • ST Depression (mm) 07/15/2022 1 5  mm Final   • Stress/rest perfusion ratio 07/15/2022 0 95   Final   • EF (%) 07/15/2022 77  % Final   Admission on 06/16/2022, Discharged on 06/17/2022   Component Date Value Ref Range Status   • POC Glucose 06/16/2022 108  65 - 140 mg/dl Final   • WBC 06/16/2022 6 52  4 31 - 10 16 Thousand/uL Final   • RBC 06/16/2022 4 14  3 81 - 5 12 Million/uL Final   • Hemoglobin 06/16/2022 13 2  11 5 - 15 4 g/dL Final   • Hematocrit 06/16/2022 40 4  34 8 - 46 1 % Final   • MCV 06/16/2022 98  82 - 98 fL Final   • MCH 06/16/2022 31 9  26 8 - 34 3 pg Final   • MCHC 06/16/2022 32 7  31 4 - 37 4 g/dL Final   • RDW 06/16/2022 12 7  11 6 - 15 1 % Final   • MPV 06/16/2022 10 3  8 9 - 12 7 fL Final   • Platelets 20/13/0151 186  149 - 390 Thousands/uL Final   • nRBC 06/16/2022 0  /100 WBCs Final   • Neutrophils Relative 06/16/2022 58  43 - 75 % Final   • Immat GRANS % 06/16/2022 1  0 - 2 % Final   • Lymphocytes Relative 06/16/2022 33  14 - 44 % Final   • Monocytes Relative 06/16/2022 6  4 - 12 % Final   • Eosinophils Relative 06/16/2022 1  0 - 6 % Final   • Basophils Relative 06/16/2022 1  0 - 1 % Final   • Neutrophils Absolute 06/16/2022 3 86  1 85 - 7 62 Thousands/µL Final   • Immature Grans Absolute 06/16/2022 0 03  0 00 - 0 20 Thousand/uL Final   • Lymphocytes Absolute 06/16/2022 2 12  0 60 - 4 47 Thousands/µL Final   • Monocytes Absolute 06/16/2022 0 41  0 17 - 1 22 Thousand/µL Final   • Eosinophils Absolute 06/16/2022 0 06  0 00 - 0 61 Thousand/µL Final   • Basophils Absolute 06/16/2022 0 04  0 00 - 0 10 Thousands/µL Final   • Sodium 06/16/2022 132 (L)  136 - 145 mmol/L Final   • Potassium 06/16/2022 6 2 (H)  3 5 - 5 3 mmol/L Final    Moderately Hemolyzed;  Results May be Affected   • Chloride 06/16/2022 108  100 - 108 mmol/L Final   • CO2 06/16/2022 20 (L)  21 - 32 mmol/L Final   • ANION GAP 06/16/2022 4  4 - 13 mmol/L Final   • BUN 06/16/2022 13  5 - 25 mg/dL Final   • Creatinine 06/16/2022 0 90  0 60 - 1 30 mg/dL Final    Standardized to IDMS reference method   • Glucose 06/16/2022 109  65 - 140 mg/dL Final    If the patient is fasting, the ADA then defines impaired fasting glucose as > 100 mg/dL and diabetes as > or equal to 123 mg/dL  Specimen collection should occur prior to Sulfasalazine administration due to the potential for falsely depressed results  Specimen collection should occur prior to Sulfapyridine administration due to the potential for falsely elevated results  • Calcium 06/16/2022 8 9  8 3 - 10 1 mg/dL Final   • Corrected Calcium 06/16/2022 9 5  8 3 - 10 1 mg/dL Final   • AST 06/16/2022 76 (H)  5 - 45 U/L Final    Moderately Hemolyzed; Results May be Affected  Specimen collection should occur prior to Sulfasalazine administration due to the potential for falsely depressed results  • ALT 06/16/2022 28  12 - 78 U/L Final    Specimen collection should occur prior to Sulfasalazine and/or Sulfapyridine administration due to the potential for falsely depressed results  • Alkaline Phosphatase 06/16/2022 64  46 - 116 U/L Final   • Total Protein 06/16/2022 8 2  6 4 - 8 2 g/dL Final   • Albumin 06/16/2022 3 3 (L)  3 5 - 5 0 g/dL Final   • Total Bilirubin 06/16/2022 0 35  0 20 - 1 00 mg/dL Final    Use of this assay is not recommended for patients undergoing treatment with eltrombopag due to the potential for falsely elevated results  • eGFR 06/16/2022 59  ml/min/1 73sq m Final   • hs TnI 0hr 06/16/2022 5  "Refer to ACS Flowchart"- see link ng/L Final    Comment:                                              Initial (time 0) result  If >=50 ng/L, Myocardial injury suggested ;  Type of myocardial injury and treatment strategy  to be determined  If 5-49 ng/L, a delta result at 2 hours and or 4 hours will be needed to further evaluate  If <4 ng/L, and chest pain has been >3 hours since onset, patient may qualify for discharge based on the HEART score in the ED  If <5 ng/L and <3hours since onset of chest pain, a delta result at 2 hours will be needed to further evaluate      HS Troponin 99th Percentile URL of a Health Population=12 ng/L with a 95% Confidence Interval of 8-18 ng/L  Second Troponin (time 2 hours)  If calculated delta >= 20 ng/L,  Myocardial injury suggested ; Type of myocardial injury and treatment strategy to be determined  If 5-49 ng/L and the calculated delta is 5-19 ng/L, consult medical service for evaluation  Continue evaluation for ischemia on ecg and other possible etiology and repeat hs troponin at 4 hours  If delta                            is <5 ng/L at 2 hours, consider discharge based on risk stratification via the HEART score (if in ED), or JOSE RAFAEL risk score in IP/Observation  HS Troponin 99th Percentile URL of a Health Population=12 ng/L with a 95% Confidence Interval of 8-18 ng/L  • hs TnI 2hr 06/16/2022 5  "Refer to ACS Flowchart"- see link ng/L Final    Comment:                                              Initial (time 0) result  If >=50 ng/L, Myocardial injury suggested ;  Type of myocardial injury and treatment strategy  to be determined  If 5-49 ng/L, a delta result at 2 hours and or 4 hours will be needed to further evaluate  If <4 ng/L, and chest pain has been >3 hours since onset, patient may qualify for discharge based on the HEART score in the ED  If <5 ng/L and <3hours since onset of chest pain, a delta result at 2 hours will be needed to further evaluate  HS Troponin 99th Percentile URL of a Health Population=12 ng/L with a 95% Confidence Interval of 8-18 ng/L  Second Troponin (time 2 hours)  If calculated delta >= 20 ng/L,  Myocardial injury suggested ; Type of myocardial injury and treatment strategy to be determined  If 5-49 ng/L and the calculated delta is 5-19 ng/L, consult medical service for evaluation  Continue evaluation for ischemia on ecg and other possible etiology and repeat hs troponin at 4 hours    If delta                            is <5 ng/L at 2 hours, consider discharge based on risk stratification via the HEART score (if in ED), or JOSE RAFAEL risk score in IP/Observation  HS Troponin 99th Percentile URL of a Health Population=12 ng/L with a 95% Confidence Interval of 8-18 ng/L  • Delta 2hr hsTnI 06/16/2022 0  <20 ng/L Final   • Ventricular Rate 06/16/2022 67  BPM Final   • Atrial Rate 06/16/2022 60  BPM Final   • SC Interval 06/16/2022 124  ms Final   • QRSD Interval 06/16/2022 82  ms Final   • QT Interval 06/16/2022 412  ms Final   • QTC Interval 06/16/2022 435  ms Final   • P Axis 06/16/2022 55  degrees Final   • QRS Axis 06/16/2022 63  degrees Final   • T Wave Axis 06/16/2022 -88  degrees Final   • Sodium 06/16/2022 138  136 - 145 mmol/L Final   • Potassium 06/16/2022 4 0  3 5 - 5 3 mmol/L Final   • Chloride 06/16/2022 108  100 - 108 mmol/L Final   • CO2 06/16/2022 23  21 - 32 mmol/L Final   • ANION GAP 06/16/2022 7  4 - 13 mmol/L Final   • BUN 06/16/2022 12  5 - 25 mg/dL Final   • Creatinine 06/16/2022 0 87  0 60 - 1 30 mg/dL Final    Standardized to IDMS reference method   • Glucose 06/16/2022 106  65 - 140 mg/dL Final    If the patient is fasting, the ADA then defines impaired fasting glucose as > 100 mg/dL and diabetes as > or equal to 123 mg/dL  Specimen collection should occur prior to Sulfasalazine administration due to the potential for falsely depressed results  Specimen collection should occur prior to Sulfapyridine administration due to the potential for falsely elevated results  • Calcium 06/16/2022 9 2  8 3 - 10 1 mg/dL Final   • eGFR 06/16/2022 62  ml/min/1 73sq m Final   • hs TnI 4hr 06/16/2022 6  "Refer to ACS Flowchart"- see link ng/L Final    Comment:                                              Initial (time 0) result  If >=50 ng/L, Myocardial injury suggested ;  Type of myocardial injury and treatment strategy  to be determined  If 5-49 ng/L, a delta result at 2 hours and or 4 hours will be needed to further evaluate    If <4 ng/L, and chest pain has been >3 hours since onset, patient may qualify for discharge based on the HEART score in the ED  If <5 ng/L and <3hours since onset of chest pain, a delta result at 2 hours will be needed to further evaluate  HS Troponin 99th Percentile URL of a Health Population=12 ng/L with a 95% Confidence Interval of 8-18 ng/L  Second Troponin (time 2 hours)  If calculated delta >= 20 ng/L,  Myocardial injury suggested ; Type of myocardial injury and treatment strategy to be determined  If 5-49 ng/L and the calculated delta is 5-19 ng/L, consult medical service for evaluation  Continue evaluation for ischemia on ecg and other possible etiology and repeat hs troponin at 4 hours  If delta                            is <5 ng/L at 2 hours, consider discharge based on risk stratification via the HEART score (if in ED), or JOSE RAFAEL risk score in IP/Observation  HS Troponin 99th Percentile URL of a Health Population=12 ng/L with a 95% Confidence Interval of 8-18 ng/L     • Delta 4hr hsTnI 06/16/2022 1  <20 ng/L Final   • Platelets 65/64/3755 180  149 - 390 Thousands/uL Final   • MPV 06/16/2022 10 0  8 9 - 12 7 fL Final   • A4C EF 06/17/2022 55  % Final   • LVIDd 06/17/2022 4 60  cm Final   • LVIDS 06/17/2022 3 10  cm Final   • IVSd 06/17/2022 0 90  cm Final   • LVPWd 06/17/2022 1 10  cm Final   • FS 06/17/2022 33  28 - 44 Final   • MV Peak A Dre 06/17/2022 0 66  m/s Final   • LA size 06/17/2022 4  cm Final   • LA/Ao Ratio 2D 06/17/2022 1 33   Final   • AV peak gradient 06/17/2022 56  mmHg Final   • AV regurgitation pressure 1/2 time 06/17/2022 716  ms Final   • MV stenosis pressure 1/2 time 06/17/2022 41  ms Final   • MV valve area p 1/2 method 06/17/2022 5 37   Final   • TR Peak Dre 06/17/2022 2 4  m/s Final   • Triscuspid Valve Regurgitation Pea* 06/17/2022 22 0  mmHg Final   • Ao root 06/17/2022 3 00  cm Final   • Tricuspid valve peak regurgitation* 06/17/2022 2 35  m/s Final   • Left ventricular stroke volume (2D) 06/17/2022 57 00  mL Final   • IVS 06/17/2022 0 9  cm Final • LEFT VENTRICLE SYSTOLIC VOLUME (MO* 97/98/6521 39  mL Final   • LV DIASTOLIC VOLUME (MOD BIPLANE) * 06/17/2022 95  mL Final   • Left Atrium Area-systolic [place]* 56/25/5805 16 7  cm2 Final   • LVSV, 2D 06/17/2022 57  mL Final   • LV EF 06/17/2022 60   Final   • PASP 06/17/2022 23 0  mmHg Final   • Sodium 06/17/2022 141  136 - 145 mmol/L Final   • Potassium 06/17/2022 4 1  3 5 - 5 3 mmol/L Final   • Chloride 06/17/2022 109 (H)  100 - 108 mmol/L Final   • CO2 06/17/2022 28  21 - 32 mmol/L Final   • ANION GAP 06/17/2022 4  4 - 13 mmol/L Final   • BUN 06/17/2022 13  5 - 25 mg/dL Final   • Creatinine 06/17/2022 0 92  0 60 - 1 30 mg/dL Final    Standardized to IDMS reference method   • Glucose 06/17/2022 114  65 - 140 mg/dL Final    If the patient is fasting, the ADA then defines impaired fasting glucose as > 100 mg/dL and diabetes as > or equal to 123 mg/dL  Specimen collection should occur prior to Sulfasalazine administration due to the potential for falsely depressed results  Specimen collection should occur prior to Sulfapyridine administration due to the potential for falsely elevated results  • Glucose, Fasting 06/17/2022 114 (H)  65 - 99 mg/dL Final    Specimen collection should occur prior to Sulfasalazine administration due to the potential for falsely depressed results  Specimen collection should occur prior to Sulfapyridine administration due to the potential for falsely elevated results     • Calcium 06/17/2022 9 0  8 3 - 10 1 mg/dL Final   • eGFR 06/17/2022 58  ml/min/1 73sq m Final   • WBC 06/17/2022 6 13  4 31 - 10 16 Thousand/uL Final   • RBC 06/17/2022 3 84  3 81 - 5 12 Million/uL Final   • Hemoglobin 06/17/2022 12 5  11 5 - 15 4 g/dL Final   • Hematocrit 06/17/2022 38 0  34 8 - 46 1 % Final   • MCV 06/17/2022 99 (H)  82 - 98 fL Final   • MCH 06/17/2022 32 6  26 8 - 34 3 pg Final   • MCHC 06/17/2022 32 9  31 4 - 37 4 g/dL Final   • RDW 06/17/2022 12 7  11 6 - 15 1 % Final   • Platelets 40/14/5573 174  149 - 390 Thousands/uL Final   • MPV 06/17/2022 10 0  8 9 - 12 7 fL Final   Lab Requisition on 04/06/2022   Component Date Value Ref Range Status   • Case Report 04/06/2022    Final                    Value:Surgical Pathology Report                         Case: [id number]                                   Authorizing Provider:  Leno Wilson MD          Collected:           04/06/2022 0800              Ordering Location:     [address] [place] [address]      Received:            04/06/2022 [address] [facility]                                                                                  Laboratory                                                                   Pathologist:           Adali Pham DO                                                     Specimens:   A) - Stomach, antrum                                                                                B) - Stomach, fundus                                                                                C) - Esophagus, distal esophagus                                                          • Final Diagnosis 04/06/2022    Final                    Value: This result contains rich text formatting which cannot be displayed here  • Note 04/06/2022    Final                    Value: This result contains rich text formatting which cannot be displayed here  • Additional Information 04/06/2022    Final                    Value: This result contains rich text formatting which cannot be displayed here  • Gross Description 04/06/2022    Final                    Value: This result contains rich text formatting which cannot be displayed here     Appointment on 03/10/2022   Component Date Value Ref Range Status   • WBC 03/10/2022 6 15  4 31 - 10 16 Thousand/uL Final   • RBC 03/10/2022 4 05  3 81 - 5 12 Million/uL Final   • Hemoglobin 03/10/2022 13 1  11 5 - 15 4 g/dL Final   • Hematocrit 03/10/2022 38 1  34 8 - 46 1 % Final   • MCV 03/10/2022 94  82 - 98 fL Final   • MCH 03/10/2022 32 3  26 8 - 34 3 pg Final   • MCHC 03/10/2022 34 4  31 4 - 37 4 g/dL Final   • RDW 03/10/2022 13 0  11 6 - 15 1 % Final   • MPV 03/10/2022 10 1  8 9 - 12 7 fL Final   • Platelets 70/64/2478 211  149 - 390 Thousands/uL Final   • nRBC 03/10/2022 0  /100 WBCs Final   • Neutrophils Relative 03/10/2022 49  43 - 75 % Final   • Immat GRANS % 03/10/2022 0  0 - 2 % Final   • Lymphocytes Relative 03/10/2022 42  14 - 44 % Final   • Monocytes Relative 03/10/2022 6  4 - 12 % Final   • Eosinophils Relative 03/10/2022 2  0 - 6 % Final   • Basophils Relative 03/10/2022 1  0 - 1 % Final   • Neutrophils Absolute 03/10/2022 3 04  1 85 - 7 62 Thousands/µL Final   • Immature Grans Absolute 03/10/2022 0 02  0 00 - 0 20 Thousand/uL Final   • Lymphocytes Absolute 03/10/2022 2 56  0 60 - 4 47 Thousands/µL Final   • Monocytes Absolute 03/10/2022 0 36  0 17 - 1 22 Thousand/µL Final   • Eosinophils Absolute 03/10/2022 0 11  0 00 - 0 61 Thousand/µL Final   • Basophils Absolute 03/10/2022 0 06  0 00 - 0 10 Thousands/µL Final   • Sodium 03/10/2022 138  136 - 145 mmol/L Final   • Potassium 03/10/2022 4 2  3 5 - 5 3 mmol/L Final   • Chloride 03/10/2022 108  100 - 108 mmol/L Final   • CO2 03/10/2022 22  21 - 32 mmol/L Final   • ANION GAP 03/10/2022 8  4 - 13 mmol/L Final   • BUN 03/10/2022 18  5 - 25 mg/dL Final   • Creatinine 03/10/2022 0 85  0 60 - 1 30 mg/dL Final    Standardized to IDMS reference method   • Glucose, Fasting 03/10/2022 119 (H)  65 - 99 mg/dL Final    Specimen collection should occur prior to Sulfasalazine administration due to the potential for falsely depressed results  Specimen collection should occur prior to Sulfapyridine administration due to the potential for falsely elevated results     • Calcium 03/10/2022 9 0  8 3 - 10 1 mg/dL Final   • AST 03/10/2022 17  5 - 45 U/L Final    Specimen collection should occur prior to Sulfasalazine administration due to the potential for falsely depressed results  • ALT 03/10/2022 20  12 - 78 U/L Final    Specimen collection should occur prior to Sulfasalazine and/or Sulfapyridine administration due to the potential for falsely depressed results  • Alkaline Phosphatase 03/10/2022 64  46 - 116 U/L Final   • Total Protein 03/10/2022 7 9  6 4 - 8 2 g/dL Final   • Albumin 03/10/2022 3 8  3 5 - 5 0 g/dL Final   • Total Bilirubin 03/10/2022 0 56  0 20 - 1 00 mg/dL Final    Use of this assay is not recommended for patients undergoing treatment with eltrombopag due to the potential for falsely elevated results  • eGFR 03/10/2022 64  ml/min/1 73sq m Final   • Cholesterol 03/10/2022 255 (H)  See Comment mg/dL Final    Cholesterol:         Pediatric <18 Years        Desirable          <170 mg/dL      Borderline High    170-199 mg/dL      High               >=200 mg/dL        Adult >=18 Years            Desirable         <200 mg/dL      Borderline High   200-239 mg/dL      High              >239 mg/dL     • Triglycerides 03/10/2022 171 (H)  See Comment mg/dL Final    Triglyceride:     0-9Y            <75mg/dL     10Y-17Y         <90 mg/dL       >=18Y     Normal          <150 mg/dL     Borderline High 150-199 mg/dL     High            200-499 mg/dL        Very High       >499 mg/dL    Specimen collection should occur prior to N-Acetylcysteine or Metamizole administration due to the potential for falsely depressed results  • HDL, Direct 03/10/2022 66  >=50 mg/dL Final    Specimen collection should occur prior to Metamizole administration due to the potential for falsley depressed results  • LDL Calculated 03/10/2022 155 (H)  0 - 100 mg/dL Final    LDL Cholesterol:     Optimal           <100 mg/dl     Near Optimal      100-129 mg/dl     Above Optimal       Borderline High 130-159 mg/dl       High            160-189 mg/dl       Very High       >189 mg/dl         This screening LDL is a calculated result     It does not have the accuracy of the Direct Measured LDL in the monitoring of patients with hyperlipidemia and/or statin therapy  Direct Measure LDL (OSK198) must be ordered separately in these patients  • Non-HDL-Chol (CHOL-HDL) 03/10/2022 189  mg/dl Final   • Vitamin B-12 03/10/2022 831  100 - 900 pg/mL Final   • Vit D, 25-Hydroxy 03/10/2022 30 6  30 0 - 100 0 ng/mL Final   • TSH 3RD GENERATON 03/10/2022 1 840  0 358 - 3 740 uIU/mL Final    The recommended reference ranges for TSH during pregnancy are as follows:   First trimester 0 1 to 2 5 uIU/mL   Second trimester  0 2 to 3 0 uIU/mL   Third trimester 0 3 to 3 0 uIU/m    Note: Normal ranges may not apply to patients who are transgender, non-binary, or whose legal sex, sex at birth, and gender identity differ     • PTH 03/10/2022 77 5  18 4 - 80 1 pg/mL Final   Office Visit on 03/01/2022   Component Date Value Ref Range Status   • Color, UA 03/10/2022 Yellow   Final   • Clarity, UA 03/10/2022 Clear   Final   • Specific Gravity, UA 03/10/2022 1 023  1 003 - 1 030 Final   • pH, UA 03/10/2022 5 0  4 5, 5 0, 5 5, 6 0, 6 5, 7 0, 7 5, 8 0 Final   • Leukocytes, UA 03/10/2022 Trace (A)  Negative Final   • Nitrite, UA 03/10/2022 Negative  Negative Final   • Protein, UA 03/10/2022 Negative  Negative mg/dl Final   • Glucose, UA 03/10/2022 Negative  Negative mg/dl Final   • Ketones, UA 03/10/2022 Negative  Negative mg/dl Final   • Urobilinogen, UA 03/10/2022 <2 0  <2 0 mg/dl mg/dl Final   • Bilirubin, UA 03/10/2022 Negative  Negative Final   • Occult Blood, UA 03/10/2022 Negative  Negative Final   • RBC, UA 03/10/2022 1-2  None Seen, 1-2 /hpf Final   • WBC, UA 03/10/2022 1-2  None Seen, 1-2 /hpf Final   • Epithelial Cells 03/10/2022 Occasional  None Seen, Occasional /hpf Final   • Bacteria, UA 03/10/2022 None Seen  None Seen, Occasional /hpf Final   • MUCUS THREADS 03/10/2022 Occasional (A)  None Seen Final

## 2023-02-06 NOTE — PATIENT INSTRUCTIONS
Take the Tylenol 2 tablets twice a day for pain  Please continue to eat healthy and exercise every day  Continue the Salonpas cream and patches    Continue the calcium and Vitamin D

## 2023-02-13 ENCOUNTER — VBI (OUTPATIENT)
Dept: ADMINISTRATIVE | Facility: OTHER | Age: 83
End: 2023-02-13

## 2023-03-15 ENCOUNTER — RA CDI HCC (OUTPATIENT)
Dept: OTHER | Facility: HOSPITAL | Age: 83
End: 2023-03-15

## 2023-03-15 NOTE — PROGRESS NOTES
[address]  coding opportunities       Chart reviewed, no opportunity found: CHART REVIEWED, NO OPPORTUNITY FOUND        Patients Insurance     Medicare Insurance: Medicare

## 2023-03-21 ENCOUNTER — OFFICE VISIT (OUTPATIENT)
Dept: INTERNAL MEDICINE CLINIC | Facility: CLINIC | Age: 83
End: 2023-03-21

## 2023-03-21 VITALS
TEMPERATURE: 96.7 F | HEART RATE: 57 BPM | WEIGHT: 142 LBS | OXYGEN SATURATION: 96 % | BODY MASS INDEX: 26.13 KG/M2 | DIASTOLIC BLOOD PRESSURE: 70 MMHG | SYSTOLIC BLOOD PRESSURE: 132 MMHG | HEIGHT: 62 IN

## 2023-03-21 DIAGNOSIS — F34.1 DYSTHYMIA: ICD-10-CM

## 2023-03-21 DIAGNOSIS — E78.2 MIXED HYPERLIPIDEMIA: ICD-10-CM

## 2023-03-21 DIAGNOSIS — E55.9 VITAMIN D DEFICIENCY: ICD-10-CM

## 2023-03-21 DIAGNOSIS — M81.0 AGE-RELATED OSTEOPOROSIS WITHOUT CURRENT PATHOLOGICAL FRACTURE: ICD-10-CM

## 2023-03-21 DIAGNOSIS — I10 ESSENTIAL HYPERTENSION: Primary | ICD-10-CM

## 2023-03-21 DIAGNOSIS — Z12.31 VISIT FOR SCREENING MAMMOGRAM: ICD-10-CM

## 2023-03-21 PROBLEM — D89.89 OTHER SPECIFIED DISORDERS INVOLVING THE IMMUNE MECHANISM, NOT ELSEWHERE CLASSIFIED (HCC): Status: ACTIVE | Noted: 2023-03-21

## 2023-03-21 NOTE — PROGRESS NOTES
Assessment/Plan:           1  Essential hypertension  Comments:  No episodes of uncontrolled blood pressure lately she has been monitoring  Continue amlodipine and olmesartan  Orders:  -     CBC and differential; Future  -     Comprehensive metabolic panel; Future  -     Urinalysis with microscopic  -     TSH, 3rd generation; Future    2  Vitamin D deficiency  Comments:  Continue supplementation  3  Dysthymia  Comments:  Continue citalopram 20 mg daily  4  Mixed hyperlipidemia  Comments:  Continue rosuvastatin 5 mg daily in the evening  Orders:  -     Lipid panel; Future    5  Visit for screening mammogram  -     Mammo screening bilateral w 3d & cad; Future; Expected date: 03/21/2023    6  Age-related osteoporosis without current pathological fracture         1  Essential hypertension    - CBC and differential; Future  - Comprehensive metabolic panel; Future  - Urinalysis with microscopic  - TSH, 3rd generation; Future    2  Vitamin D deficiency      3  Dysthymia      4  Mixed hyperlipidemia    - Lipid panel; Future    5  Visit for screening mammogram    - Mammo screening bilateral w 3d & cad; Future    6  Other specified disorders involving the immune mechanism, not elsewhere classified ([facility] )         No problem-specific Assessment & Plan notes found for this encounter  Subjective:      Patient ID: Nancy Lovell is a 79 y o  female  HPI    The following portions of the patient's history were reviewed and updated as appropriate: She  has a past medical history of Aortic insufficiency, Cee's esophagus, Gastroesophageal reflux disease, Glaucoma, Hypercholesterolemia, Hyperlipidemia, Hypertension, Kidney stones (2009), Low back pain, Major depressive disorder, Mitral valve insufficiency, Osteoporosis, Osteoporosis, and Vitamin D deficiency    She   Patient Active Problem List    Diagnosis Date Noted   • Other specified disorders involving the immune mechanism, not elsewhere classified ([facility] ) 03/21/2023   • Age-related osteoporosis without current pathological fracture 03/21/2023   • Fall 06/16/2022   • Pancreatic cyst 03/19/2021   • Vitamin D deficiency 03/19/2021   • Dysthymia 03/19/2021   • Gastroesophageal reflux disease without esophagitis 03/19/2021   • Essential hypertension 01/19/2018   • Hyperlipemia 01/19/2018   • Anxiety 01/19/2018   • Bradycardia 01/19/2018   • Dizziness 01/19/2018     She  has a past surgical history that includes Cystoscopy (2009); Other surgical history; Colonoscopy (06/02/2011); and Mammo (historical) (09/19/2016)  Her family history includes Cancer (age of onset: 80) in her mother; Lung cancer (age of onset: 72) in her brother; Lung cancer (age of onset: 79) in her brother; No Known Problems in her daughter, daughter, father, maternal grandfather, maternal grandmother, paternal grandfather, paternal grandmother, sister, and son  She  reports that she has never smoked  She has never used smokeless tobacco  She reports that she does not currently use alcohol  She reports that she does not use drugs    Current Outpatient Medications   Medication Sig Dispense Refill   • acetaminophen (TYLENOL) 325 mg tablet As needed body aches     • amLODIPine (NORVASC) 2 5 mg tablet Take 1 tablet (2 5 mg total) by mouth daily 90 tablet 1   • Ascorbic Acid (vitamin C) 1000 MG tablet Take 1,000 mg by mouth daily     • cholecalciferol (VITAMIN D3) 25 mcg (1,000 units) tablet Take 1,000 Units by mouth daily     • citalopram (CeleXA) 20 mg tablet TAKE 1/2 OR 1 TAB DAILY AS DIRECTED BY DOCTOR 90 tablet 3   • dorzolamide (TRUSOPT) 2 % ophthalmic solution      • Lumigan 0 01 % ophthalmic drops      • Multiple Vitamin tablet Take by mouth     • olmesartan (BENICAR) 20 mg tablet Take 1 tablet (20 mg total) by mouth 2 (two) times a day 180 tablet 1   • omeprazole (PriLOSEC) 20 mg delayed release capsule      • Travatan Z 0 004 % ophthalmic solution      • aspirin (ECOTRIN LOW STRENGTH) 81 mg EC tablet Take 81 mg by mouth daily Sometimes (Patient not taking: Reported on 3/21/2023)     • rosuvastatin (CRESTOR) 5 mg tablet TAKE 1 TABLET (5 MG TOTAL) BY MOUTH DAILY AT BEDTIME (Patient not taking: Reported on 3/21/2023) 90 tablet 1   • sodium chloride (OCEAN) 0 65 % nasal spray 1 spray into each nostril every 2 (two) hours while awake (Patient not taking: Reported on 3/21/2023) 60 mL 1     No current facility-administered medications for this visit  Current Outpatient Medications on File Prior to Visit   Medication Sig   • acetaminophen (TYLENOL) 325 mg tablet As needed body aches   • amLODIPine (NORVASC) 2 5 mg tablet Take 1 tablet (2 5 mg total) by mouth daily   • Ascorbic Acid (vitamin C) 1000 MG tablet Take 1,000 mg by mouth daily   • cholecalciferol (VITAMIN D3) 25 mcg (1,000 units) tablet Take 1,000 Units by mouth daily   • citalopram (CeleXA) 20 mg tablet TAKE 1/2 OR 1 TAB DAILY AS DIRECTED BY DOCTOR   • dorzolamide (TRUSOPT) 2 % ophthalmic solution    • Lumigan 0 01 % ophthalmic drops    • Multiple Vitamin tablet Take by mouth   • olmesartan (BENICAR) 20 mg tablet Take 1 tablet (20 mg total) by mouth 2 (two) times a day   • omeprazole (PriLOSEC) 20 mg delayed release capsule    • Travatan Z 0 004 % ophthalmic solution    • aspirin (ECOTRIN LOW STRENGTH) 81 mg EC tablet Take 81 mg by mouth daily Sometimes (Patient not taking: Reported on 3/21/2023)   • rosuvastatin (CRESTOR) 5 mg tablet TAKE 1 TABLET (5 MG TOTAL) BY MOUTH DAILY AT BEDTIME (Patient not taking: Reported on 3/21/2023)   • sodium chloride (OCEAN) 0 65 % nasal spray 1 spray into each nostril every 2 (two) hours while awake (Patient not taking: Reported on 3/21/2023)     No current facility-administered medications on file prior to visit  She has No Known Allergies       Review of Systems   Constitutional: Negative for appetite change, chills, fatigue and fever  HENT: Negative for sore throat and trouble swallowing      Eyes: Negative for redness  Respiratory: Negative for shortness of breath  Cardiovascular: Negative for chest pain and palpitations  Gastrointestinal: Negative for abdominal pain, constipation and diarrhea  Genitourinary: Negative for dysuria and hematuria  Musculoskeletal: Negative for back pain and neck pain  Skin: Negative for rash  Neurological: Negative for seizures, weakness and headaches  Hematological: Negative for adenopathy  Psychiatric/Behavioral: Negative for confusion  The patient is not nervous/anxious  Objective:      /70 (BP Location: Left arm, Patient Position: Sitting, Cuff Size: Standard)   Pulse 57   Temp (!) 96 7 °F (35 9 °C) (Temporal)   Ht 5' 2" (1 575 m)   Wt 64 4 kg (142 lb)   SpO2 96% Comment: RA  BMI 25 97 kg/m²     Results Reviewed     None          No results found for this or any previous visit (from the past 1344 hour(s))  Physical Exam  Constitutional:       General: She is not in acute distress  Appearance: Normal appearance  HENT:      Head: Normocephalic and atraumatic  Nose: Nose normal       Mouth/Throat:      Mouth: Mucous membranes are moist    Eyes:      Extraocular Movements: Extraocular movements intact  Pupils: Pupils are equal, round, and reactive to light  Cardiovascular:      Rate and Rhythm: Normal rate and regular rhythm  Pulses: Normal pulses  Heart sounds: Normal heart sounds  No murmur heard  No friction rub  Pulmonary:      Effort: Pulmonary effort is normal  No respiratory distress  Breath sounds: Normal breath sounds  No wheezing  Abdominal:      General: Abdomen is flat  Bowel sounds are normal  There is no distension  Palpations: Abdomen is soft  There is no mass  Tenderness: There is no abdominal tenderness  There is no guarding  Musculoskeletal:         General: Normal range of motion  Cervical back: Normal range of motion  Skin:     General: Skin is warm     Neurological: General: No focal deficit present  Mental Status: She is alert and oriented to person, place, and time  Mental status is at baseline  Cranial Nerves: No cranial nerve deficit     Psychiatric:         Mood and Affect: Mood normal          Behavior: Behavior normal

## 2023-06-08 DIAGNOSIS — I10 ESSENTIAL HYPERTENSION: ICD-10-CM

## 2023-06-08 DIAGNOSIS — F41.9 ANXIETY: ICD-10-CM

## 2023-06-08 RX ORDER — OLMESARTAN MEDOXOMIL 20 MG/1
20 TABLET ORAL 2 TIMES DAILY
Qty: 180 TABLET | Refills: 1 | Status: SHIPPED | OUTPATIENT
Start: 2023-06-08

## 2023-06-08 RX ORDER — AMLODIPINE BESYLATE 2.5 MG/1
2.5 TABLET ORAL DAILY
Qty: 90 TABLET | Refills: 1 | Status: SHIPPED | OUTPATIENT
Start: 2023-06-08

## 2023-06-08 NOTE — TELEPHONE ENCOUNTER
Refill Request     Amlodipine   Olmesartan     [facility]: [facility]     [place]: 3/21/23  NA: 7/18/23

## 2023-06-12 RX ORDER — CITALOPRAM 20 MG/1
TABLET ORAL
Qty: 90 TABLET | Refills: 3 | Status: SHIPPED | OUTPATIENT
Start: 2023-06-12

## 2023-07-10 ENCOUNTER — APPOINTMENT (OUTPATIENT)
Dept: LAB | Facility: CLINIC | Age: 83
End: 2023-07-10
Payer: MEDICARE

## 2023-07-10 DIAGNOSIS — I10 ESSENTIAL HYPERTENSION: ICD-10-CM

## 2023-07-10 DIAGNOSIS — E78.2 MIXED HYPERLIPIDEMIA: ICD-10-CM

## 2023-07-10 LAB
ALBUMIN SERPL BCP-MCNC: 3.5 G/DL (ref 3.5–5)
ALP SERPL-CCNC: 61 U/L (ref 46–116)
ALT SERPL W P-5'-P-CCNC: 19 U/L (ref 12–78)
ANION GAP SERPL CALCULATED.3IONS-SCNC: 8 MMOL/L
AST SERPL W P-5'-P-CCNC: 21 U/L (ref 5–45)
BACTERIA UR QL AUTO: ABNORMAL /HPF
BASOPHILS # BLD AUTO: 0.04 THOUSANDS/ÂΜL (ref 0–0.1)
BASOPHILS NFR BLD AUTO: 1 % (ref 0–1)
BILIRUB SERPL-MCNC: 0.46 MG/DL (ref 0.2–1)
BILIRUB UR QL STRIP: NEGATIVE
BUN SERPL-MCNC: 12 MG/DL (ref 5–25)
CALCIUM SERPL-MCNC: 8.8 MG/DL (ref 8.3–10.1)
CHLORIDE SERPL-SCNC: 113 MMOL/L (ref 96–108)
CHOLEST SERPL-MCNC: 273 MG/DL
CLARITY UR: CLEAR
CO2 SERPL-SCNC: 20 MMOL/L (ref 21–32)
COLOR UR: ABNORMAL
CREAT SERPL-MCNC: 0.78 MG/DL (ref 0.6–1.3)
EOSINOPHIL # BLD AUTO: 0.09 THOUSAND/ÂΜL (ref 0–0.61)
EOSINOPHIL NFR BLD AUTO: 2 % (ref 0–6)
ERYTHROCYTE [DISTWIDTH] IN BLOOD BY AUTOMATED COUNT: 12.7 % (ref 11.6–15.1)
GFR SERPL CREATININE-BSD FRML MDRD: 70 ML/MIN/1.73SQ M
GLUCOSE P FAST SERPL-MCNC: 117 MG/DL (ref 65–99)
GLUCOSE UR STRIP-MCNC: NEGATIVE MG/DL
HCT VFR BLD AUTO: 38.9 % (ref 34.8–46.1)
HDLC SERPL-MCNC: 65 MG/DL
HGB BLD-MCNC: 12.9 G/DL (ref 11.5–15.4)
HGB UR QL STRIP.AUTO: NEGATIVE
IMM GRANULOCYTES # BLD AUTO: 0.01 THOUSAND/UL (ref 0–0.2)
IMM GRANULOCYTES NFR BLD AUTO: 0 % (ref 0–2)
KETONES UR STRIP-MCNC: NEGATIVE MG/DL
LDLC SERPL CALC-MCNC: 169 MG/DL (ref 0–100)
LEUKOCYTE ESTERASE UR QL STRIP: ABNORMAL
LYMPHOCYTES # BLD AUTO: 2.13 THOUSANDS/ÂΜL (ref 0.6–4.47)
LYMPHOCYTES NFR BLD AUTO: 47 % (ref 14–44)
MCH RBC QN AUTO: 32.9 PG (ref 26.8–34.3)
MCHC RBC AUTO-ENTMCNC: 33.2 G/DL (ref 31.4–37.4)
MCV RBC AUTO: 99 FL (ref 82–98)
MONOCYTES # BLD AUTO: 0.31 THOUSAND/ÂΜL (ref 0.17–1.22)
MONOCYTES NFR BLD AUTO: 7 % (ref 4–12)
NEUTROPHILS # BLD AUTO: 1.93 THOUSANDS/ÂΜL (ref 1.85–7.62)
NEUTS SEG NFR BLD AUTO: 43 % (ref 43–75)
NITRITE UR QL STRIP: NEGATIVE
NON-SQ EPI CELLS URNS QL MICRO: ABNORMAL /HPF
NONHDLC SERPL-MCNC: 208 MG/DL
NRBC BLD AUTO-RTO: 0 /100 WBCS
PH UR STRIP.AUTO: 6.5 [PH]
PLATELET # BLD AUTO: 211 THOUSANDS/UL (ref 149–390)
PMV BLD AUTO: 10 FL (ref 8.9–12.7)
POTASSIUM SERPL-SCNC: 4.4 MMOL/L (ref 3.5–5.3)
PROT SERPL-MCNC: 7.6 G/DL (ref 6.4–8.4)
PROT UR STRIP-MCNC: NEGATIVE MG/DL
RBC # BLD AUTO: 3.92 MILLION/UL (ref 3.81–5.12)
RBC #/AREA URNS AUTO: ABNORMAL /HPF
SODIUM SERPL-SCNC: 141 MMOL/L (ref 135–147)
SP GR UR STRIP.AUTO: 1.01 (ref 1–1.03)
TRIGL SERPL-MCNC: 197 MG/DL
TSH SERPL DL<=0.05 MIU/L-ACNC: 1.33 UIU/ML (ref 0.45–4.5)
UROBILINOGEN UR STRIP-ACNC: <2 MG/DL
WBC # BLD AUTO: 4.51 THOUSAND/UL (ref 4.31–10.16)
WBC #/AREA URNS AUTO: ABNORMAL /HPF

## 2023-07-10 PROCEDURE — 36415 COLL VENOUS BLD VENIPUNCTURE: CPT

## 2023-07-10 PROCEDURE — 80053 COMPREHEN METABOLIC PANEL: CPT

## 2023-07-10 PROCEDURE — 85025 COMPLETE CBC W/AUTO DIFF WBC: CPT

## 2023-07-10 PROCEDURE — 80061 LIPID PANEL: CPT

## 2023-07-10 PROCEDURE — 84443 ASSAY THYROID STIM HORMONE: CPT

## 2023-07-11 ENCOUNTER — HOSPITAL ENCOUNTER (OUTPATIENT)
Dept: RADIOLOGY | Age: 83
Discharge: HOME | End: 2023-07-11
Payer: MEDICARE

## 2023-07-11 VITALS — WEIGHT: 142 LBS | HEIGHT: 62 IN | BODY MASS INDEX: 26.13 KG/M2

## 2023-07-11 DIAGNOSIS — Z12.31 VISIT FOR SCREENING MAMMOGRAM: ICD-10-CM

## 2023-07-11 PROCEDURE — 77067 SCR MAMMO BI INCL CAD: CPT

## 2023-07-11 PROCEDURE — 77063 BREAST TOMOSYNTHESIS BI: CPT

## 2023-08-29 ENCOUNTER — OFFICE VISIT (OUTPATIENT)
Dept: INTERNAL MEDICINE CLINIC | Facility: CLINIC | Age: 83
End: 2023-08-29
Payer: COMMERCIAL

## 2023-08-29 VITALS
HEART RATE: 49 BPM | BODY MASS INDEX: 25.76 KG/M2 | TEMPERATURE: 97.3 F | SYSTOLIC BLOOD PRESSURE: 158 MMHG | OXYGEN SATURATION: 98 % | HEIGHT: 62 IN | DIASTOLIC BLOOD PRESSURE: 70 MMHG | WEIGHT: 140 LBS

## 2023-08-29 DIAGNOSIS — D89.89 OTHER SPECIFIED DISORDERS INVOLVING THE IMMUNE MECHANISM, NOT ELSEWHERE CLASSIFIED (HCC): ICD-10-CM

## 2023-08-29 DIAGNOSIS — M54.2 NECK PAIN: ICD-10-CM

## 2023-08-29 DIAGNOSIS — Z00.00 MEDICARE ANNUAL WELLNESS VISIT, SUBSEQUENT: ICD-10-CM

## 2023-08-29 DIAGNOSIS — K21.9 GASTROESOPHAGEAL REFLUX DISEASE WITHOUT ESOPHAGITIS: ICD-10-CM

## 2023-08-29 DIAGNOSIS — I10 ESSENTIAL HYPERTENSION: Primary | ICD-10-CM

## 2023-08-29 PROCEDURE — 99214 OFFICE O/P EST MOD 30 MIN: CPT | Performed by: INTERNAL MEDICINE

## 2023-08-29 PROCEDURE — G0439 PPPS, SUBSEQ VISIT: HCPCS | Performed by: INTERNAL MEDICINE

## 2023-08-29 RX ORDER — FAMOTIDINE 20 MG/1
20 TABLET, FILM COATED ORAL 2 TIMES DAILY
Qty: 180 TABLET | Refills: 1 | Status: SHIPPED | OUTPATIENT
Start: 2023-08-29 | End: 2024-08-23

## 2023-08-29 RX ORDER — AMLODIPINE BESYLATE 2.5 MG/1
2.5 TABLET ORAL DAILY
Qty: 90 TABLET | Refills: 1 | Status: SHIPPED | OUTPATIENT
Start: 2023-08-29

## 2023-08-29 NOTE — PROGRESS NOTES
Assessment and Plan:     Problem List Items Addressed This Visit        Digestive    Gastroesophageal reflux disease without esophagitis    Relevant Medications    famotidine (PEPCID) 20 mg tablet       Cardiovascular and Mediastinum    Essential hypertension - Primary    Relevant Medications    amLODIPine (NORVASC) 2.5 mg tablet       Other    RESOLVED: Other specified disorders involving the immune mechanism, not elsewhere classified ([address])   Other Visit Diagnoses     Medicare annual wellness visit, subsequent        Neck pain        Tylenol and Aspercreme or lidocaine patch advised. 1. Essential hypertension  amLODIPine (NORVASC) 2.5 mg tablet    Amlodipine was decreased to 2.5 mg daily. 2. Other specified disorders involving the immune mechanism, not elsewhere classified ([address])        3. Gastroesophageal reflux disease without esophagitis  famotidine (PEPCID) 20 mg tablet    Famotidine recommended. 4. Medicare annual wellness visit, subsequent        5. Neck pain      Tylenol and Aspercreme or lidocaine patch advised. Preventive health issues were discussed with patient, and age appropriate screening tests were ordered as noted in patient's After Visit Summary. Personalized health advice and appropriate referrals for health education or preventive services given if needed, as noted in patient's After Visit Summary. History of Present Illness:     Patient presents for a Medicare Wellness Visit    This 26-year-old lady with a history of hypertension hyperlipidemia anxiety and depression. She denies any chest pain or shortness of breath but complains of some neck pain.      Patient Care Team:  Michaela Schafer MD as PCP - General     Review of Systems:     Review of Systems     Problem List:     Patient Active Problem List   Diagnosis   • Essential hypertension   • Hyperlipemia   • Anxiety   • Bradycardia   • Dizziness   • Pancreatic cyst   • Vitamin D deficiency   • Dysthymia   • Gastroesophageal reflux disease without esophagitis   • Fall   • Age-related osteoporosis without current pathological fracture      Past Medical and Surgical History:     Past Medical History:   Diagnosis Date   • Aortic insufficiency    • Cee's esophagus    • Gastroesophageal reflux disease    • Glaucoma    • Hypercholesterolemia    • Hyperlipidemia    • Hypertension    • Kidney stones 2009   • Low back pain    • Major depressive disorder    • Mitral valve insufficiency    • Osteoporosis    • Osteoporosis    • Vitamin D deficiency      Past Surgical History:   Procedure Laterality Date   • COLONOSCOPY  06/02/2011    Normal    • CYSTOSCOPY  2009    Polyp, stones   • MAMMO (HISTORICAL)  09/19/2016    Negative    • OTHER SURGICAL HISTORY      Infection in arm       Family History:     Family History   Problem Relation Age of Onset   • Cancer Mother 80        unknown type of abdominal cancer   • No Known Problems Father    • No Known Problems Sister    • No Known Problems Daughter    • No Known Problems Daughter    • No Known Problems Maternal Grandmother    • No Known Problems Maternal Grandfather    • No Known Problems Paternal Grandmother    • No Known Problems Paternal Grandfather    • Lung cancer Brother 79   • Lung cancer Brother 72   • No Known Problems Son    • Breast cancer Neg Hx       Social History:     Social History     Socioeconomic History   • Marital status: /Civil Union     Spouse name: None   • Number of children: None   • Years of education: None   • Highest education level: None   Occupational History   • None   Tobacco Use   • Smoking status: Never   • Smokeless tobacco: Never   Vaping Use   • Vaping Use: Never used   Substance and Sexual Activity   • Alcohol use: Not Currently   • Drug use: No   • Sexual activity: Not Currently   Other Topics Concern   • None   Social History Narrative   • None     Social Determinants of Health     Financial Resource Strain: Low Risk  (8/29/2023) Overall Financial Resource Strain (CARDIA)    • Difficulty of Paying Living Expenses: Not hard at all   Food Insecurity: Not on file   Transportation Needs: No Transportation Needs (8/29/2023)    PRAPARE - Transportation    • Lack of Transportation (Medical): No    • Lack of Transportation (Non-Medical):  No   Physical Activity: Not on file   Stress: Not on file   Social Connections: Not on file   Intimate Partner Violence: Not on file   Housing Stability: Not on file      Medications and Allergies:     Current Outpatient Medications   Medication Sig Dispense Refill   • acetaminophen (TYLENOL) 325 mg tablet As needed body aches     • amLODIPine (NORVASC) 2.5 mg tablet Take 1 tablet (2.5 mg total) by mouth daily 90 tablet 1   • Ascorbic Acid (vitamin C) 1000 MG tablet Take 1,000 mg by mouth daily     • cholecalciferol (VITAMIN D3) 25 mcg (1,000 units) tablet Take 1,000 Units by mouth daily     • citalopram (CeleXA) 20 mg tablet TAKE 1/2 OR 1 TABLET DAILY AS DIRECTED BY DOCTOR 90 tablet 3   • dorzolamide (TRUSOPT) 2 % ophthalmic solution      • famotidine (PEPCID) 20 mg tablet Take 1 tablet (20 mg total) by mouth 2 (two) times a day 180 tablet 1   • Lumigan 0.01 % ophthalmic drops      • Multiple Vitamin tablet Take by mouth     • olmesartan (BENICAR) 20 mg tablet Take 1 tablet (20 mg total) by mouth 2 (two) times a day 180 tablet 1   • omeprazole (PriLOSEC) 20 mg delayed release capsule      • Travatan Z 0.004 % ophthalmic solution      • aspirin (ECOTRIN LOW STRENGTH) 81 mg EC tablet Take 81 mg by mouth daily Sometimes (Patient not taking: Reported on 3/21/2023)     • rosuvastatin (CRESTOR) 5 mg tablet TAKE 1 TABLET (5 MG TOTAL) BY MOUTH DAILY AT BEDTIME (Patient not taking: Reported on 3/21/2023) 90 tablet 1   • sodium chloride (OCEAN) 0.65 % nasal spray 1 spray into each nostril every 2 (two) hours while awake (Patient not taking: Reported on 3/21/2023) 60 mL 1     No current facility-administered medications for this visit. No Known Allergies   Immunizations:     Immunization History   Administered Date(s) Administered   • COVID-19 PFIZER VACCINE 0.3 ML IM 03/11/2021, 04/01/2021   • INFLUENZA 10/21/2009, 09/09/2020   • Pneumococcal Polysaccharide PPV23 06/24/2011   • Tdap 04/24/2013   • influenza, trivalent, adjuvanted 09/09/2020      Health Maintenance: There are no preventive care reminders to display for this patient. Topic Date Due   • Pneumococcal Vaccine: 65+ Years (2 - PCV) 06/24/2012   • COVID-19 Vaccine (3 - Pfizer series) 05/27/2021   • Influenza Vaccine (1) 09/01/2023      Medicare Screening Tests and Risk Assessments:     Álvaro Nicholson is here for her Subsequent Wellness visit. Last Medicare Wellness visit information reviewed, patient interviewed and updates made to the record today. Health Risk Assessment:   Patient rates overall health as good. Patient feels that their physical health rating is same. Patient is satisfied with their life. Eyesight was rated as same. Hearing was rated as same. Patient feels that their emotional and mental health rating is same. Patients states they are sometimes angry. Patient states they are never, rarely unusually tired/fatigued. Pain experienced in the last 7 days has been none. Patient states that she has experienced no weight loss or gain in last 6 months. Depression Screening:   PHQ-9 Score: 0      Fall Risk Screening: In the past year, patient has experienced: history of falling in past year    Number of falls: 1  Injured during fall?: Yes    Feels unsteady when standing or walking?: Yes    Worried about falling?: Yes      Urinary Incontinence Screening:   Patient has not leaked urine accidently in the last six months. Home Safety:  Patient does not have trouble with stairs inside or outside of their home. Patient has working smoke alarms and has no working carbon monoxide detector. Home safety hazards include: none.      Nutrition:   Current diet is Regular. Medications:   Patient is currently taking over-the-counter supplements. OTC medications include: see medication list. Patient is able to manage medications. Activities of Daily Living (ADLs)/Instrumental Activities of Daily Living (IADLs):   Walk and transfer into and out of bed and chair?: Yes  Dress and groom yourself?: Yes    Bathe or shower yourself?: Yes    Feed yourself? Yes  Do your laundry/housekeeping?: Yes  Manage your money, pay your bills and track your expenses?: Yes  Make your own meals?: Yes    Do your own shopping?: Yes    Previous Hospitalizations:   Any hospitalizations or ED visits within the last 12 months?: Yes    How many hospitalizations have you had in the last year?: 1-2    Advance Care Planning:   Living will: Yes    Durable POA for healthcare: Yes    Advanced directive: Yes      PREVENTIVE SCREENINGS      Cardiovascular Screening:    General: Screening Not Indicated and History Lipid Disorder      Diabetes Screening:     General: Screening Current      Breast Cancer Screening:     General: Screening Current      Cervical Cancer Screening:    General: Screening Not Indicated      Osteoporosis Screening:    General: Screening Not Indicated and History Osteoporosis      Lung Cancer Screening:     General: Screening Not Indicated    Screening, Brief Intervention, and Referral to Treatment (SBIRT)    Screening  Typical number of drinks in a day: 0  Typical number of drinks in a week: 0  Interpretation: Low risk drinking behavior. Single Item Drug Screening:  How often have you used an illegal drug (including marijuana) or a prescription medication for non-medical reasons in the past year? never    Single Item Drug Screen Score: 0  Interpretation: Negative screen for possible drug use disorder    No results found.      Physical Exam:     /70   Pulse (!) 49   Temp (!) 97.3 °F (36.3 °C) (Temporal)   Ht 5' 2" (1.575 m)   Wt 63.5 kg (140 lb)   SpO2 98%   BMI 25.61 kg/m²     Physical Exam  Vitals and nursing note reviewed. Constitutional:       General: She is not in acute distress. Appearance: She is well-developed. HENT:      Head: Normocephalic and atraumatic. Mouth/Throat:      Mouth: Mucous membranes are moist.   Eyes:      Conjunctiva/sclera: Conjunctivae normal.   Cardiovascular:      Rate and Rhythm: Normal rate and regular rhythm. Heart sounds: No murmur heard. Pulmonary:      Effort: Pulmonary effort is normal. No respiratory distress. Breath sounds: Normal breath sounds. Abdominal:      Palpations: Abdomen is soft. Tenderness: There is no abdominal tenderness. Musculoskeletal:         General: No swelling. Cervical back: Normal range of motion and neck supple. Skin:     General: Skin is warm and dry. Capillary Refill: Capillary refill takes less than 2 seconds. Neurological:      Mental Status: She is alert. Psychiatric:         Mood and Affect: Mood normal.        BMI Counseling: Body mass index is 25.61 kg/m². The BMI is above normal. Exercise recommendations include exercising 3-5 times per week.   Nas Carroll MD

## 2023-12-06 ENCOUNTER — RA CDI HCC (OUTPATIENT)
Dept: OTHER | Facility: HOSPITAL | Age: 83
End: 2023-12-06

## 2023-12-06 NOTE — PROGRESS NOTES
[address] [facility] coding opportunities       Chart reviewed, no opportunity found: [id number]        Patients Insurance     Medicare Insurance: [employer]

## 2023-12-07 ENCOUNTER — OFFICE VISIT (OUTPATIENT)
Dept: INTERNAL MEDICINE CLINIC | Facility: CLINIC | Age: 83
End: 2023-12-07
Payer: COMMERCIAL

## 2023-12-07 VITALS
SYSTOLIC BLOOD PRESSURE: 162 MMHG | BODY MASS INDEX: 25.76 KG/M2 | HEART RATE: 64 BPM | OXYGEN SATURATION: 95 % | TEMPERATURE: 99 F | DIASTOLIC BLOOD PRESSURE: 72 MMHG | WEIGHT: 140 LBS | HEIGHT: 62 IN

## 2023-12-07 DIAGNOSIS — J06.9 UPPER RESPIRATORY TRACT INFECTION, UNSPECIFIED TYPE: ICD-10-CM

## 2023-12-07 DIAGNOSIS — R05.9 COUGH IN ADULT PATIENT: Primary | ICD-10-CM

## 2023-12-07 LAB
SARS-COV-2 AG UPPER RESP QL IA: NEGATIVE
VALID CONTROL: NORMAL

## 2023-12-07 PROCEDURE — 99213 OFFICE O/P EST LOW 20 MIN: CPT | Performed by: INTERNAL MEDICINE

## 2023-12-07 PROCEDURE — 87811 SARS-COV-2 COVID19 W/OPTIC: CPT | Performed by: INTERNAL MEDICINE

## 2023-12-07 RX ORDER — AZITHROMYCIN 250 MG/1
TABLET, FILM COATED ORAL
Qty: 6 TABLET | Refills: 0 | Status: SHIPPED | OUTPATIENT
Start: 2023-12-07 | End: 2023-12-11

## 2023-12-07 NOTE — PROGRESS NOTES
Assessment/Plan:    Diagnoses and all orders for this visit:    Cough in adult patient  -     POCT Rapid Covid Ag    Upper respiratory tract infection, unspecified type  -     azithromycin (Zithromax) 250 mg tablet; Take 2 tablets (500 mg total) by mouth daily for 1 day, THEN 1 tablet (250 mg total) daily for 4 days. Office COVID test was negative  Discussed supportive management including plenty of hydration  Will give a trial of antibiotics, follow-up if symptoms are not improved. There are no Patient Instructions on file for this visit. Subjective:      Patient ID: Fatuma Infante is a 79 y.o. female    Patient complains of ongoing productive cough congestion and rhinorrhea for the last 2 weeks, not improving with OTC medications. Denies any fever chills chest pain shortness of breath or wheezing. Current Outpatient Medications:     acetaminophen (TYLENOL) 325 mg tablet, As needed body aches, Disp: , Rfl:     amLODIPine (NORVASC) 2.5 mg tablet, Take 1 tablet (2.5 mg total) by mouth daily, Disp: 90 tablet, Rfl: 1    Ascorbic Acid (vitamin C) 1000 MG tablet, Take 1,000 mg by mouth daily, Disp: , Rfl:     azithromycin (Zithromax) 250 mg tablet, Take 2 tablets (500 mg total) by mouth daily for 1 day, THEN 1 tablet (250 mg total) daily for 4 days. , Disp: 6 tablet, Rfl: 0    cholecalciferol (VITAMIN D3) 25 mcg (1,000 units) tablet, Take 1,000 Units by mouth daily, Disp: , Rfl:     citalopram (CeleXA) 20 mg tablet, TAKE 1/2 OR 1 TABLET DAILY AS DIRECTED BY DOCTOR, Disp: 90 tablet, Rfl: 3    dorzolamide (TRUSOPT) 2 % ophthalmic solution, , Disp: , Rfl:     famotidine (PEPCID) 20 mg tablet, Take 1 tablet (20 mg total) by mouth 2 (two) times a day, Disp: 180 tablet, Rfl: 1    Lumigan 0.01 % ophthalmic drops, , Disp: , Rfl:     Multiple Vitamin tablet, Take by mouth, Disp: , Rfl:     olmesartan (BENICAR) 20 mg tablet, Take 1 tablet (20 mg total) by mouth 2 (two) times a day, Disp: 180 tablet, Rfl: 1    omeprazole (PriLOSEC) 20 mg delayed release capsule, , Disp: , Rfl:     Travatan Z 0.004 % ophthalmic solution, , Disp: , Rfl:     aspirin (ECOTRIN LOW STRENGTH) 81 mg EC tablet, Take 81 mg by mouth daily Sometimes (Patient not taking: Reported on 3/21/2023), Disp: , Rfl:     rosuvastatin (CRESTOR) 5 mg tablet, TAKE 1 TABLET (5 MG TOTAL) BY MOUTH DAILY AT BEDTIME (Patient not taking: Reported on 3/21/2023), Disp: 90 tablet, Rfl: 1    sodium chloride (OCEAN) 0.65 % nasal spray, 1 spray into each nostril every 2 (two) hours while awake (Patient not taking: Reported on 3/21/2023), Disp: 60 mL, Rfl: 1     Past Medical History:   Diagnosis Date    Aortic insufficiency     Cee's esophagus     Gastroesophageal reflux disease     Glaucoma     Hypercholesterolemia     Hyperlipidemia     Hypertension     Kidney stones 2009    Low back pain     Major depressive disorder     Mitral valve insufficiency     Osteoporosis     Osteoporosis     Vitamin D deficiency          Past Surgical History:   Procedure Laterality Date    COLONOSCOPY  06/02/2011    Normal     CYSTOSCOPY  2009    Polyp, stones    MAMMO (HISTORICAL)  09/19/2016    Negative     OTHER SURGICAL HISTORY      Infection in arm          No Known Allergies    Recent Results (from the past 1008 hour(s))   POCT Rapid Covid Ag    Collection Time: 12/07/23 10:50 AM   Result Value Ref Range    POCT SARS-CoV-2 Ag Negative Negative    VALID CONTROL Valid        The following portions of the patient's history were reviewed and updated as appropriate: allergies, current medications, past family history, past medical history, past social history, past surgical history and problem list.     Review of Systems   Constitutional:  Negative for activity change, appetite change, chills, diaphoresis, fatigue, fever and unexpected weight change. HENT:  Positive for congestion.  Negative for drooling, ear discharge, ear pain, facial swelling, hearing loss, postnasal drip, rhinorrhea, sinus pressure, sinus pain, sneezing, sore throat, tinnitus, trouble swallowing and voice change. Eyes:  Negative for discharge. Respiratory:  Positive for cough. Negative for apnea, choking, chest tightness, shortness of breath, wheezing and stridor. Cardiovascular:  Negative for chest pain, palpitations and leg swelling. Gastrointestinal:  Negative for abdominal distention, abdominal pain, anal bleeding, blood in stool, constipation, diarrhea, nausea and vomiting. Genitourinary:  Negative for decreased urine volume, difficulty urinating, frequency and urgency. Musculoskeletal:  Negative for arthralgias, back pain and myalgias. Skin:  Negative for color change. Neurological:  Negative for dizziness, light-headedness, numbness and headaches. Objective:      Vitals:    12/07/23 1033   BP: 162/72   Pulse: 64   Temp: 99 °F (37.2 °C)   SpO2: 95%          Physical Exam  Vitals reviewed. Constitutional:       General: She is not in acute distress. Appearance: Normal appearance. She is not ill-appearing, toxic-appearing or diaphoretic. HENT:      Mouth/Throat:      Mouth: Mucous membranes are moist.      Pharynx: Posterior oropharyngeal erythema present. No oropharyngeal exudate. Cardiovascular:      Rate and Rhythm: Normal rate and regular rhythm. Pulses: Normal pulses. Heart sounds: Normal heart sounds. No murmur heard. No friction rub. No gallop. Pulmonary:      Effort: Pulmonary effort is normal. No respiratory distress. Breath sounds: Normal breath sounds. No stridor. No wheezing, rhonchi or rales. Chest:      Chest wall: No tenderness. Musculoskeletal:      Right lower leg: No edema. Left lower leg: No edema. Skin:     General: Skin is warm and dry. Findings: No lesion or rash. Neurological:      Mental Status: She is alert.

## 2023-12-19 ENCOUNTER — OFFICE VISIT (OUTPATIENT)
Dept: INTERNAL MEDICINE CLINIC | Facility: CLINIC | Age: 83
End: 2023-12-19
Payer: COMMERCIAL

## 2023-12-19 VITALS
TEMPERATURE: 97 F | DIASTOLIC BLOOD PRESSURE: 82 MMHG | HEIGHT: 62 IN | BODY MASS INDEX: 25.76 KG/M2 | OXYGEN SATURATION: 98 % | SYSTOLIC BLOOD PRESSURE: 160 MMHG | HEART RATE: 64 BPM | WEIGHT: 140 LBS

## 2023-12-19 DIAGNOSIS — M81.0 AGE-RELATED OSTEOPOROSIS WITHOUT CURRENT PATHOLOGICAL FRACTURE: ICD-10-CM

## 2023-12-19 DIAGNOSIS — I10 ESSENTIAL HYPERTENSION: Primary | ICD-10-CM

## 2023-12-19 DIAGNOSIS — E55.9 VITAMIN D DEFICIENCY: ICD-10-CM

## 2023-12-19 DIAGNOSIS — E78.2 MIXED HYPERLIPIDEMIA: ICD-10-CM

## 2023-12-19 DIAGNOSIS — K21.9 GASTROESOPHAGEAL REFLUX DISEASE WITHOUT ESOPHAGITIS: ICD-10-CM

## 2023-12-19 PROCEDURE — 99214 OFFICE O/P EST MOD 30 MIN: CPT | Performed by: INTERNAL MEDICINE

## 2023-12-19 RX ORDER — FAMOTIDINE 20 MG/1
20 TABLET, FILM COATED ORAL 2 TIMES DAILY
Qty: 180 TABLET | Refills: 1 | Status: SHIPPED | OUTPATIENT
Start: 2023-12-19 | End: 2024-12-13

## 2023-12-19 NOTE — PROGRESS NOTES
Assessment/Plan:           1. Essential hypertension  Comments:  Blood pressure elevated.  She was delayed in taking her medicine today.  Orders:  -     CBC and differential; Future  -     Comprehensive metabolic panel; Future  -     UA (URINE) with reflex to Scope    2. Age-related osteoporosis without current pathological fracture  -     DXA bone density spine hip and pelvis; Future; Expected date: 12/19/2023    3. Gastroesophageal reflux disease without esophagitis  Comments:  Famotidine recommended.  Orders:  -     famotidine (PEPCID) 20 mg tablet; Take 1 tablet (20 mg total) by mouth 2 (two) times a day    4. Vitamin D deficiency    5. Mixed hyperlipidemia  Comments:  Pertinent labs and investigations reviewed.  Continue current medications except as noted.  Orders:  -     Lipid panel; Future           1. Essential hypertension      2. Gastroesophageal reflux disease without esophagitis         No problem-specific Assessment & Plan notes found for this encounter.           Subjective:      Patient ID: Ralf Ayala is a 83 y.o. female.    HPI    The following portions of the patient's history were reviewed and updated as appropriate: She  has a past medical history of Aortic insufficiency, Cee's esophagus, Gastroesophageal reflux disease, Glaucoma, Hypercholesterolemia, Hyperlipidemia, Hypertension, Kidney stones (2009), Low back pain, Major depressive disorder, Mitral valve insufficiency, Osteoporosis, Osteoporosis, and Vitamin D deficiency.  She   Patient Active Problem List    Diagnosis Date Noted    Age-related osteoporosis without current pathological fracture 03/21/2023    Fall 06/16/2022    Pancreatic cyst 03/19/2021    Vitamin D deficiency 03/19/2021    Dysthymia 03/19/2021    Gastroesophageal reflux disease without esophagitis 03/19/2021    Essential hypertension 01/19/2018    Hyperlipemia 01/19/2018    Anxiety 01/19/2018    Bradycardia 01/19/2018    Dizziness 01/19/2018     She  has a past  surgical history that includes Cystoscopy (2009); Other surgical history; Colonoscopy (06/02/2011); and Mammo (historical) (09/19/2016).  Her family history includes Cancer (age of onset: 90) in her mother; Lung cancer (age of onset: 65) in her brother; Lung cancer (age of onset: 70) in her brother; No Known Problems in her daughter, daughter, father, maternal grandfather, maternal grandmother, paternal grandfather, paternal grandmother, sister, and son.  She  reports that she has never smoked. She has never used smokeless tobacco. She reports that she does not currently use alcohol. She reports that she does not use drugs.  Current Outpatient Medications   Medication Sig Dispense Refill    acetaminophen (TYLENOL) 325 mg tablet As needed body aches      amLODIPine (NORVASC) 2.5 mg tablet Take 1 tablet (2.5 mg total) by mouth daily 90 tablet 1    Ascorbic Acid (vitamin C) 1000 MG tablet Take 1,000 mg by mouth daily      cholecalciferol (VITAMIN D3) 25 mcg (1,000 units) tablet Take 1,000 Units by mouth daily      citalopram (CeleXA) 20 mg tablet TAKE 1/2 OR 1 TABLET DAILY AS DIRECTED BY DOCTOR 90 tablet 3    dorzolamide (TRUSOPT) 2 % ophthalmic solution       famotidine (PEPCID) 20 mg tablet Take 1 tablet (20 mg total) by mouth 2 (two) times a day 180 tablet 1    Lumigan 0.01 % ophthalmic drops       Multiple Vitamin tablet Take by mouth      olmesartan (BENICAR) 20 mg tablet Take 1 tablet (20 mg total) by mouth 2 (two) times a day 180 tablet 1    omeprazole (PriLOSEC) 20 mg delayed release capsule       Travatan Z 0.004 % ophthalmic solution       aspirin (ECOTRIN LOW STRENGTH) 81 mg EC tablet Take 81 mg by mouth daily Sometimes (Patient not taking: Reported on 3/21/2023)      rosuvastatin (CRESTOR) 5 mg tablet TAKE 1 TABLET (5 MG TOTAL) BY MOUTH DAILY AT BEDTIME (Patient not taking: Reported on 3/21/2023) 90 tablet 1    sodium chloride (OCEAN) 0.65 % nasal spray 1 spray into each nostril every 2 (two) hours while  awake (Patient not taking: Reported on 3/21/2023) 60 mL 1     No current facility-administered medications for this visit.     Current Outpatient Medications on File Prior to Visit   Medication Sig    acetaminophen (TYLENOL) 325 mg tablet As needed body aches    amLODIPine (NORVASC) 2.5 mg tablet Take 1 tablet (2.5 mg total) by mouth daily    Ascorbic Acid (vitamin C) 1000 MG tablet Take 1,000 mg by mouth daily    cholecalciferol (VITAMIN D3) 25 mcg (1,000 units) tablet Take 1,000 Units by mouth daily    citalopram (CeleXA) 20 mg tablet TAKE 1/2 OR 1 TABLET DAILY AS DIRECTED BY DOCTOR    dorzolamide (TRUSOPT) 2 % ophthalmic solution     Lumigan 0.01 % ophthalmic drops     Multiple Vitamin tablet Take by mouth    olmesartan (BENICAR) 20 mg tablet Take 1 tablet (20 mg total) by mouth 2 (two) times a day    omeprazole (PriLOSEC) 20 mg delayed release capsule     Travatan Z 0.004 % ophthalmic solution     aspirin (ECOTRIN LOW STRENGTH) 81 mg EC tablet Take 81 mg by mouth daily Sometimes (Patient not taking: Reported on 3/21/2023)    rosuvastatin (CRESTOR) 5 mg tablet TAKE 1 TABLET (5 MG TOTAL) BY MOUTH DAILY AT BEDTIME (Patient not taking: Reported on 3/21/2023)    sodium chloride (OCEAN) 0.65 % nasal spray 1 spray into each nostril every 2 (two) hours while awake (Patient not taking: Reported on 3/21/2023)     No current facility-administered medications on file prior to visit.     Medications Discontinued During This Encounter   Medication Reason    famotidine (PEPCID) 20 mg tablet Reorder      She has No Known Allergies..    Review of Systems   Constitutional:  Negative for appetite change, chills, fatigue and fever.   HENT:  Negative for sore throat and trouble swallowing.    Eyes:  Negative for redness.   Respiratory:  Negative for shortness of breath.    Cardiovascular:  Negative for chest pain and palpitations.   Gastrointestinal:  Negative for abdominal pain, constipation and diarrhea.   Genitourinary:  Negative  "for dysuria and hematuria.   Musculoskeletal:  Negative for back pain and neck pain.   Skin:  Negative for rash.   Neurological:  Negative for seizures, weakness and headaches.   Hematological:  Negative for adenopathy.   Psychiatric/Behavioral:  Negative for confusion. The patient is not nervous/anxious.          Objective:      /82   Pulse 64   Temp (!) 97 °F (36.1 °C) (Temporal)   Ht 5' 2\" (1.575 m)   Wt 63.5 kg (140 lb)   SpO2 98%   BMI 25.61 kg/m²     Results Reviewed       None            Recent Results (from the past 1344 hour(s))   POCT Rapid Covid Ag    Collection Time: 12/07/23 10:50 AM   Result Value Ref Range    POCT SARS-CoV-2 Ag Negative Negative    VALID CONTROL Valid         Physical Exam  Constitutional:       General: She is not in acute distress.     Appearance: Normal appearance.   HENT:      Head: Normocephalic and atraumatic.      Nose: Nose normal.      Mouth/Throat:      Mouth: Mucous membranes are moist.   Eyes:      Extraocular Movements: Extraocular movements intact.      Pupils: Pupils are equal, round, and reactive to light.   Cardiovascular:      Rate and Rhythm: Normal rate and regular rhythm.      Pulses: Normal pulses.      Heart sounds: Normal heart sounds. No murmur heard.     No friction rub.   Pulmonary:      Effort: Pulmonary effort is normal. No respiratory distress.      Breath sounds: Normal breath sounds. No wheezing.   Abdominal:      General: Abdomen is flat. Bowel sounds are normal. There is no distension.      Palpations: Abdomen is soft. There is no mass.      Tenderness: There is no abdominal tenderness. There is no guarding.   Musculoskeletal:         General: Normal range of motion.   Neurological:      General: No focal deficit present.      Mental Status: She is alert and oriented to person, place, and time. Mental status is at baseline.      Cranial Nerves: No cranial nerve deficit.   Psychiatric:         Mood and Affect: Mood normal.         Behavior: " Behavior normal.       Falls Plan of Care: Balance, strength, and gait training instructions were provided.

## 2024-01-16 ENCOUNTER — TELEPHONE (OUTPATIENT)
Dept: RHEUMATOLOGY | Facility: CLINIC | Age: 84
End: 2024-01-16

## 2024-03-11 ENCOUNTER — OFFICE VISIT (OUTPATIENT)
Dept: RHEUMATOLOGY | Facility: CLINIC | Age: 84
End: 2024-03-11
Payer: COMMERCIAL

## 2024-03-11 VITALS
OXYGEN SATURATION: 97 % | TEMPERATURE: 97.1 F | HEART RATE: 58 BPM | SYSTOLIC BLOOD PRESSURE: 136 MMHG | HEIGHT: 62 IN | DIASTOLIC BLOOD PRESSURE: 70 MMHG | WEIGHT: 142.8 LBS | BODY MASS INDEX: 26.28 KG/M2

## 2024-03-11 DIAGNOSIS — M19.012 PRIMARY OSTEOARTHRITIS OF LEFT SHOULDER: Primary | ICD-10-CM

## 2024-03-11 DIAGNOSIS — S46.212S STRAIN OF LEFT BICEPS, SEQUELA: ICD-10-CM

## 2024-03-11 PROCEDURE — 99214 OFFICE O/P EST MOD 30 MIN: CPT | Performed by: STUDENT IN AN ORGANIZED HEALTH CARE EDUCATION/TRAINING PROGRAM

## 2024-03-11 NOTE — PROGRESS NOTES
Rheumatology Follow-up Visit  3/11/2024     CC: routine follow up     Permanent History: First seen by Dr. Turner in 2022 for myalgias in the arms after lifting something heavy. Still has some LUE weakness.    Also with history OP previously on ibandronate for 5 years    Assessment: 83 y.o. female here for/with the above.    I'm more comfortable doing shoulder inj with US and given her degree of OA I would rather her see Sports Med for it    No signs/symptoms of a rheumatologic disease, does not need to come back and see me    Encounter Diagnosis     ICD-10-CM    1. Primary osteoarthritis of left shoulder  M19.012 Ambulatory Referral to Sports Medicine      2. Strain of left biceps, sequela  S46.212S Ambulatory Referral to Occupational Therapy          Plan:  -As above  -RTC PRN    Carl Gandhi DO, CCD    Carl Gandhi DO, [facility]      Interval History: Worsening pain/weakness as the day goes on particularly in the LUE, gets tired quickly. R knee pain worse with walking better with rest.    She is interested in L shoulder injection    Past medical history, allergies, and family history reviewed. Active medications and social history as below.     Outpatient Medications Marked as Taking for the 3/11/24 encounter (Office Visit) with Carl Gandhi DO   Medication    acetaminophen (TYLENOL) 325 mg tablet    amLODIPine (NORVASC) 2.5 mg tablet    Ascorbic Acid (vitamin C) 1000 MG tablet    cholecalciferol (VITAMIN D3) 25 mcg (1,000 units) tablet    citalopram (CeleXA) 20 mg tablet    dorzolamide (TRUSOPT) 2 % ophthalmic solution    famotidine (PEPCID) 20 mg tablet    Lumigan 0.01 % ophthalmic drops    Multiple Vitamin tablet    olmesartan (BENICAR) 20 mg tablet    Travatan Z 0.004 % ophthalmic solution       Social History     Tobacco Use    Smoking status: Never    Smokeless tobacco: Never   Vaping Use    Vaping status: Never Used   Substance Use Topics    Alcohol use: Not Currently    Drug  "use: No       Review of Systems:  Pertinent findings documented in HPI  ___________________________________    Physical Exam:    /70 (BP Location: Left arm, Patient Position: Sitting, Cuff Size: Adult)   Pulse 58   Temp (!) 97.1 °F (36.2 °C) (Tympanic)   Ht 5' 1.75\" (1.568 m)   Wt 64.8 kg (142 lb 12.8 oz)   SpO2 97%   BMI 26.33 kg/m²     General: Well appearing, in no distress.   Eyes: Sclera non-icteric. EOMI  HENT: No oral ulcers. MMM.   Extremities: Warm, well perfused, no edema.   Neuro: Alert and oriented. No gross focal neurological deficits.   Skin: No rashes.  MSK exam: no tenderness to palpation bilateral shoulder, bilateral bicep  Muscle strength   Right   Left    Shoulder abduction 5/5  Shoulder abduction 5/5   Shoulder adduction 5/5  Shoulder adduction 5/5   Shoulder internal rotation 5/5  Shoulder internal rotation 5/5   Shoulder external rotation 5/5  Shoulder external rotation 5/5     ____________________________    Lab Results: relevant labs reviewed    Imaging Results: relevant images reviewed  I have independently reviewed the following images.  L shoulder roentgenogram 2021: OA changes wo evidence of inflammatory changes  "

## 2024-03-11 NOTE — PATIENT INSTRUCTIONS
Yo do not need to come back and see rheumatology    I have written a referral to Sports Medicine to inject your left shoulder with steroids

## 2024-03-14 DIAGNOSIS — I10 ESSENTIAL HYPERTENSION: ICD-10-CM

## 2024-03-14 RX ORDER — AMLODIPINE BESYLATE 2.5 MG/1
2.5 TABLET ORAL DAILY
Qty: 90 TABLET | Refills: 1 | Status: SHIPPED | OUTPATIENT
Start: 2024-03-14

## 2024-03-14 NOTE — TELEPHONE ENCOUNTER
Refill Request     Amlodipine 2.5 mg (Patient is out of medication, please send in ASAP)     Pharmacy: [facility]    LA: 12/19/23  NA: 5/28/24

## 2024-04-23 ENCOUNTER — OFFICE VISIT (OUTPATIENT)
Dept: INTERNAL MEDICINE CLINIC | Facility: CLINIC | Age: 84
End: 2024-04-23
Payer: COMMERCIAL

## 2024-04-23 VITALS
HEIGHT: 62 IN | BODY MASS INDEX: 26.5 KG/M2 | WEIGHT: 144 LBS | HEART RATE: 69 BPM | DIASTOLIC BLOOD PRESSURE: 70 MMHG | SYSTOLIC BLOOD PRESSURE: 162 MMHG | TEMPERATURE: 97.9 F | OXYGEN SATURATION: 97 %

## 2024-04-23 DIAGNOSIS — K21.9 GASTROESOPHAGEAL REFLUX DISEASE WITHOUT ESOPHAGITIS: ICD-10-CM

## 2024-04-23 DIAGNOSIS — I10 ESSENTIAL HYPERTENSION: Primary | ICD-10-CM

## 2024-04-23 DIAGNOSIS — J30.89 NON-SEASONAL ALLERGIC RHINITIS, UNSPECIFIED TRIGGER: ICD-10-CM

## 2024-04-23 DIAGNOSIS — H93.11 TINNITUS OF RIGHT EAR: ICD-10-CM

## 2024-04-23 PROCEDURE — G2211 COMPLEX E/M VISIT ADD ON: HCPCS | Performed by: INTERNAL MEDICINE

## 2024-04-23 PROCEDURE — 99214 OFFICE O/P EST MOD 30 MIN: CPT | Performed by: INTERNAL MEDICINE

## 2024-04-23 RX ORDER — MONTELUKAST SODIUM 10 MG/1
10 TABLET ORAL
Qty: 30 TABLET | Refills: 2 | Status: SHIPPED | OUTPATIENT
Start: 2024-04-23 | End: 2024-04-23

## 2024-04-23 RX ORDER — FLUTICASONE PROPIONATE 50 MCG
1 SPRAY, SUSPENSION (ML) NASAL DAILY
Qty: 10 ML | Refills: 2 | Status: SHIPPED | OUTPATIENT
Start: 2024-04-23

## 2024-04-23 RX ORDER — FLUTICASONE PROPIONATE 50 MCG
1 SPRAY, SUSPENSION (ML) NASAL DAILY
Qty: 10 ML | Refills: 2 | Status: SHIPPED | OUTPATIENT
Start: 2024-04-23 | End: 2024-04-23

## 2024-04-23 RX ORDER — CETIRIZINE HYDROCHLORIDE 10 MG/1
10 TABLET ORAL DAILY
Qty: 30 TABLET | Refills: 2 | Status: SHIPPED | OUTPATIENT
Start: 2024-04-23

## 2024-04-23 RX ORDER — CETIRIZINE HYDROCHLORIDE 10 MG/1
10 TABLET ORAL DAILY
Qty: 30 TABLET | Refills: 2 | Status: SHIPPED | OUTPATIENT
Start: 2024-04-23 | End: 2024-04-23

## 2024-04-23 RX ORDER — MONTELUKAST SODIUM 10 MG/1
10 TABLET ORAL
Qty: 30 TABLET | Refills: 2 | Status: SHIPPED | OUTPATIENT
Start: 2024-04-23

## 2024-04-23 RX ORDER — OLMESARTAN MEDOXOMIL 20 MG/1
20 TABLET ORAL 2 TIMES DAILY
Qty: 180 TABLET | Refills: 1 | Status: SHIPPED | OUTPATIENT
Start: 2024-04-23

## 2024-04-23 NOTE — PROGRESS NOTES
Assessment/Plan:           1. Essential hypertension  Comments:  Continue amlodipine and olmesartan.  Orders:  -     olmesartan (BENICAR) 20 mg tablet; Take 1 tablet (20 mg total) by mouth 2 (two) times a day    2. Non-seasonal allergic rhinitis, unspecified trigger  Comments:  Flonase cetirizine and montelukast recommended.  Orders:  -     cetirizine (ZyrTEC) 10 mg tablet; Take 1 tablet (10 mg total) by mouth daily  -     fluticasone (FLONASE) 50 mcg/act nasal spray; 1 spray into each nostril daily  -     montelukast (SINGULAIR) 10 mg tablet; Take 1 tablet (10 mg total) by mouth daily at bedtime    3. Tinnitus of right ear  Comments:  ENT evaluation advised.  Orders:  -     Ambulatory Referral to Otolaryngology; Future    4. Gastroesophageal reflux disease without esophagitis           1. Essential hypertension    - olmesartan (BENICAR) 20 mg tablet; Take 1 tablet (20 mg total) by mouth 2 (two) times a day  Dispense: 180 tablet; Refill: 1    2. Non-seasonal allergic rhinitis, unspecified trigger      3. Tinnitus of right ear         No problem-specific Assessment & Plan notes found for this encounter.           Subjective:      Patient ID: Ralf Ayala is a 83 y.o. female.    Is a 83-year-old lady who complains of buzzing sound in the right side of her head.  She also complains of significant congestion and allergies.  Denies any fever chills double vision or any weakness.        The following portions of the patient's history were reviewed and updated as appropriate: She  has a past medical history of Aortic insufficiency, Cee's esophagus, Gastroesophageal reflux disease, Glaucoma, Hypercholesterolemia, Hyperlipidemia, Hypertension, Kidney stones (2009), Low back pain, Major depressive disorder, Mitral valve insufficiency, Osteoporosis, Osteoporosis, and Vitamin D deficiency.  She   Patient Active Problem List    Diagnosis Date Noted    Age-related osteoporosis without current pathological fracture  03/21/2023    Fall 06/16/2022    Pancreatic cyst 03/19/2021    Vitamin D deficiency 03/19/2021    Dysthymia 03/19/2021    Gastroesophageal reflux disease without esophagitis 03/19/2021    Essential hypertension 01/19/2018    Hyperlipemia 01/19/2018    Anxiety 01/19/2018    Bradycardia 01/19/2018    Dizziness 01/19/2018     She  has a past surgical history that includes Cystoscopy (2009); Other surgical history; Colonoscopy (06/02/2011); and Mammo (historical) (09/19/2016).  Her family history includes Cancer (age of onset: 90) in her mother; Lung cancer (age of onset: 65) in her brother; Lung cancer (age of onset: 70) in her brother; No Known Problems in her daughter, daughter, father, maternal grandfather, maternal grandmother, paternal grandfather, paternal grandmother, sister, and son.  She  reports that she has never smoked. She has never used smokeless tobacco. She reports that she does not currently use alcohol. She reports that she does not use drugs.  Current Outpatient Medications   Medication Sig Dispense Refill    acetaminophen (TYLENOL) 325 mg tablet As needed body aches      amLODIPine (NORVASC) 2.5 mg tablet Take 1 tablet (2.5 mg total) by mouth daily 90 tablet 1    Ascorbic Acid (vitamin C) 1000 MG tablet Take 1,000 mg by mouth daily      cetirizine (ZyrTEC) 10 mg tablet Take 1 tablet (10 mg total) by mouth daily 30 tablet 2    cholecalciferol (VITAMIN D3) 25 mcg (1,000 units) tablet Take 1,000 Units by mouth daily      citalopram (CeleXA) 20 mg tablet TAKE 1/2 OR 1 TABLET DAILY AS DIRECTED BY DOCTOR 90 tablet 3    dorzolamide (TRUSOPT) 2 % ophthalmic solution       famotidine (PEPCID) 20 mg tablet Take 1 tablet (20 mg total) by mouth 2 (two) times a day 180 tablet 1    fluticasone (FLONASE) 50 mcg/act nasal spray 1 spray into each nostril daily 10 mL 2    Lumigan 0.01 % ophthalmic drops       montelukast (SINGULAIR) 10 mg tablet Take 1 tablet (10 mg total) by mouth daily at bedtime 30 tablet 2     Multiple Vitamin tablet Take by mouth      olmesartan (BENICAR) 20 mg tablet Take 1 tablet (20 mg total) by mouth 2 (two) times a day 180 tablet 1    Travatan Z 0.004 % ophthalmic solution       aspirin (ECOTRIN LOW STRENGTH) 81 mg EC tablet Take 81 mg by mouth daily Sometimes (Patient not taking: Reported on 3/21/2023)      omeprazole (PriLOSEC) 20 mg delayed release capsule  (Patient not taking: Reported on 3/11/2024)      rosuvastatin (CRESTOR) 5 mg tablet TAKE 1 TABLET (5 MG TOTAL) BY MOUTH DAILY AT BEDTIME (Patient not taking: Reported on 3/21/2023) 90 tablet 1    sodium chloride (OCEAN) 0.65 % nasal spray 1 spray into each nostril every 2 (two) hours while awake (Patient not taking: Reported on 3/21/2023) 60 mL 1     No current facility-administered medications for this visit.     Current Outpatient Medications on File Prior to Visit   Medication Sig    acetaminophen (TYLENOL) 325 mg tablet As needed body aches    amLODIPine (NORVASC) 2.5 mg tablet Take 1 tablet (2.5 mg total) by mouth daily    Ascorbic Acid (vitamin C) 1000 MG tablet Take 1,000 mg by mouth daily    cholecalciferol (VITAMIN D3) 25 mcg (1,000 units) tablet Take 1,000 Units by mouth daily    citalopram (CeleXA) 20 mg tablet TAKE 1/2 OR 1 TABLET DAILY AS DIRECTED BY DOCTOR    dorzolamide (TRUSOPT) 2 % ophthalmic solution     famotidine (PEPCID) 20 mg tablet Take 1 tablet (20 mg total) by mouth 2 (two) times a day    Lumigan 0.01 % ophthalmic drops     Multiple Vitamin tablet Take by mouth    Travatan Z 0.004 % ophthalmic solution     [DISCONTINUED] olmesartan (BENICAR) 20 mg tablet Take 1 tablet (20 mg total) by mouth 2 (two) times a day    aspirin (ECOTRIN LOW STRENGTH) 81 mg EC tablet Take 81 mg by mouth daily Sometimes (Patient not taking: Reported on 3/21/2023)    omeprazole (PriLOSEC) 20 mg delayed release capsule  (Patient not taking: Reported on 3/11/2024)    rosuvastatin (CRESTOR) 5 mg tablet TAKE 1 TABLET (5 MG TOTAL) BY MOUTH DAILY AT  "BEDTIME (Patient not taking: Reported on 3/21/2023)    sodium chloride (OCEAN) 0.65 % nasal spray 1 spray into each nostril every 2 (two) hours while awake (Patient not taking: Reported on 3/21/2023)     No current facility-administered medications on file prior to visit.     Medications Discontinued During This Encounter   Medication Reason    olmesartan (BENICAR) 20 mg tablet Reorder    fluticasone (FLONASE) 50 mcg/act nasal spray     cetirizine (ZyrTEC) 10 mg tablet     montelukast (SINGULAIR) 10 mg tablet       She has No Known Allergies..    Review of Systems   Constitutional:  Negative for appetite change, chills, fatigue and fever.   HENT:  Positive for congestion and tinnitus. Negative for sore throat and trouble swallowing.    Eyes:  Negative for redness.   Respiratory:  Negative for shortness of breath.    Cardiovascular:  Negative for chest pain and palpitations.   Gastrointestinal:  Negative for abdominal pain, constipation and diarrhea.   Genitourinary:  Negative for dysuria and hematuria.   Musculoskeletal:  Positive for arthralgias. Negative for back pain and neck pain.   Skin:  Negative for rash.   Neurological:  Negative for seizures, weakness and headaches.   Hematological:  Negative for adenopathy.   Psychiatric/Behavioral:  Negative for confusion. The patient is not nervous/anxious.          Objective:      /70 (BP Location: Left arm, Patient Position: Sitting, Cuff Size: Standard)   Pulse 69   Temp 97.9 °F (36.6 °C) (Temporal)   Ht 5' 1.75\" (1.568 m)   Wt 65.3 kg (144 lb)   SpO2 97%   BMI 26.55 kg/m²     Results Reviewed       None            No results found for this or any previous visit (from the past 1344 hour(s)).     Physical Exam  Constitutional:       General: She is not in acute distress.     Appearance: Normal appearance.   HENT:      Head: Normocephalic and atraumatic.      Nose: Nose normal.      Mouth/Throat:      Mouth: Mucous membranes are moist.   Eyes:      " Extraocular Movements: Extraocular movements intact.      Pupils: Pupils are equal, round, and reactive to light.   Cardiovascular:      Rate and Rhythm: Normal rate and regular rhythm.      Pulses: Normal pulses.      Heart sounds: Normal heart sounds. No murmur heard.     No friction rub.   Pulmonary:      Effort: Pulmonary effort is normal. No respiratory distress.      Breath sounds: Normal breath sounds. No wheezing.   Abdominal:      General: Abdomen is flat. Bowel sounds are normal. There is no distension.      Palpations: Abdomen is soft. There is no mass.      Tenderness: There is no abdominal tenderness. There is no guarding.   Musculoskeletal:         General: Normal range of motion.      Cervical back: Normal range of motion.   Neurological:      General: No focal deficit present.      Mental Status: She is alert and oriented to person, place, and time. Mental status is at baseline.      Cranial Nerves: No cranial nerve deficit.   Psychiatric:         Mood and Affect: Mood normal.         Behavior: Behavior normal.

## 2024-05-13 DIAGNOSIS — I10 ESSENTIAL HYPERTENSION: ICD-10-CM

## 2024-05-14 RX ORDER — OLMESARTAN MEDOXOMIL 20 MG/1
20 TABLET ORAL 2 TIMES DAILY
Qty: 180 TABLET | Refills: 1 | Status: SHIPPED | OUTPATIENT
Start: 2024-05-14

## 2024-05-22 ENCOUNTER — APPOINTMENT (OUTPATIENT)
Dept: LAB | Facility: CLINIC | Age: 84
End: 2024-05-22
Payer: COMMERCIAL

## 2024-05-22 DIAGNOSIS — I10 ESSENTIAL HYPERTENSION: ICD-10-CM

## 2024-05-22 DIAGNOSIS — E78.2 MIXED HYPERLIPIDEMIA: ICD-10-CM

## 2024-05-22 LAB
ALBUMIN SERPL BCP-MCNC: 4.1 G/DL (ref 3.5–5)
ALP SERPL-CCNC: 46 U/L (ref 34–104)
ALT SERPL W P-5'-P-CCNC: 17 U/L (ref 7–52)
ANION GAP SERPL CALCULATED.3IONS-SCNC: 10 MMOL/L (ref 4–13)
AST SERPL W P-5'-P-CCNC: 22 U/L (ref 13–39)
BASOPHILS # BLD AUTO: 0.05 THOUSANDS/ÂΜL (ref 0–0.1)
BASOPHILS NFR BLD AUTO: 1 % (ref 0–1)
BILIRUB SERPL-MCNC: 0.44 MG/DL (ref 0.2–1)
BILIRUB UR QL STRIP: NEGATIVE
BUN SERPL-MCNC: 16 MG/DL (ref 5–25)
CALCIUM SERPL-MCNC: 9.3 MG/DL (ref 8.4–10.2)
CHLORIDE SERPL-SCNC: 107 MMOL/L (ref 96–108)
CHOLEST SERPL-MCNC: 277 MG/DL
CLARITY UR: CLEAR
CO2 SERPL-SCNC: 23 MMOL/L (ref 21–32)
COLOR UR: NORMAL
CREAT SERPL-MCNC: 0.78 MG/DL (ref 0.6–1.3)
EOSINOPHIL # BLD AUTO: 0.12 THOUSAND/ÂΜL (ref 0–0.61)
EOSINOPHIL NFR BLD AUTO: 2 % (ref 0–6)
ERYTHROCYTE [DISTWIDTH] IN BLOOD BY AUTOMATED COUNT: 12.8 % (ref 11.6–15.1)
GFR SERPL CREATININE-BSD FRML MDRD: 70 ML/MIN/1.73SQ M
GLUCOSE P FAST SERPL-MCNC: 115 MG/DL (ref 65–99)
GLUCOSE UR STRIP-MCNC: NEGATIVE MG/DL
HCT VFR BLD AUTO: 40.7 % (ref 34.8–46.1)
HDLC SERPL-MCNC: 62 MG/DL
HGB BLD-MCNC: 13.1 G/DL (ref 11.5–15.4)
HGB UR QL STRIP.AUTO: NEGATIVE
IMM GRANULOCYTES # BLD AUTO: 0.01 THOUSAND/UL (ref 0–0.2)
IMM GRANULOCYTES NFR BLD AUTO: 0 % (ref 0–2)
KETONES UR STRIP-MCNC: NEGATIVE MG/DL
LDLC SERPL CALC-MCNC: 168 MG/DL (ref 0–100)
LEUKOCYTE ESTERASE UR QL STRIP: NEGATIVE
LYMPHOCYTES # BLD AUTO: 2.46 THOUSANDS/ÂΜL (ref 0.6–4.47)
LYMPHOCYTES NFR BLD AUTO: 46 % (ref 14–44)
MCH RBC QN AUTO: 32.4 PG (ref 26.8–34.3)
MCHC RBC AUTO-ENTMCNC: 32.2 G/DL (ref 31.4–37.4)
MCV RBC AUTO: 101 FL (ref 82–98)
MONOCYTES # BLD AUTO: 0.38 THOUSAND/ÂΜL (ref 0.17–1.22)
MONOCYTES NFR BLD AUTO: 7 % (ref 4–12)
NEUTROPHILS # BLD AUTO: 2.33 THOUSANDS/ÂΜL (ref 1.85–7.62)
NEUTS SEG NFR BLD AUTO: 44 % (ref 43–75)
NITRITE UR QL STRIP: NEGATIVE
NONHDLC SERPL-MCNC: 215 MG/DL
NRBC BLD AUTO-RTO: 0 /100 WBCS
PH UR STRIP.AUTO: 5.5 [PH]
PLATELET # BLD AUTO: 205 THOUSANDS/UL (ref 149–390)
PMV BLD AUTO: 10.7 FL (ref 8.9–12.7)
POTASSIUM SERPL-SCNC: 4.1 MMOL/L (ref 3.5–5.3)
PROT SERPL-MCNC: 7.5 G/DL (ref 6.4–8.4)
PROT UR STRIP-MCNC: NEGATIVE MG/DL
RBC # BLD AUTO: 4.04 MILLION/UL (ref 3.81–5.12)
SODIUM SERPL-SCNC: 140 MMOL/L (ref 135–147)
SP GR UR STRIP.AUTO: 1.02 (ref 1–1.03)
TRIGL SERPL-MCNC: 236 MG/DL
UROBILINOGEN UR STRIP-ACNC: <2 MG/DL
WBC # BLD AUTO: 5.35 THOUSAND/UL (ref 4.31–10.16)

## 2024-05-22 PROCEDURE — 85025 COMPLETE CBC W/AUTO DIFF WBC: CPT

## 2024-05-22 PROCEDURE — 36415 COLL VENOUS BLD VENIPUNCTURE: CPT

## 2024-05-22 PROCEDURE — 80053 COMPREHEN METABOLIC PANEL: CPT

## 2024-05-22 PROCEDURE — 80061 LIPID PANEL: CPT

## 2024-05-28 ENCOUNTER — OFFICE VISIT (OUTPATIENT)
Dept: INTERNAL MEDICINE CLINIC | Facility: CLINIC | Age: 84
End: 2024-05-28
Payer: COMMERCIAL

## 2024-05-28 VITALS
SYSTOLIC BLOOD PRESSURE: 138 MMHG | BODY MASS INDEX: 26.62 KG/M2 | HEIGHT: 61 IN | HEART RATE: 55 BPM | OXYGEN SATURATION: 97 % | TEMPERATURE: 97.9 F | WEIGHT: 141 LBS | DIASTOLIC BLOOD PRESSURE: 70 MMHG

## 2024-05-28 DIAGNOSIS — M25.512 CHRONIC LEFT SHOULDER PAIN: ICD-10-CM

## 2024-05-28 DIAGNOSIS — M81.0 AGE-RELATED OSTEOPOROSIS WITHOUT CURRENT PATHOLOGICAL FRACTURE: ICD-10-CM

## 2024-05-28 DIAGNOSIS — E55.9 VITAMIN D DEFICIENCY: ICD-10-CM

## 2024-05-28 DIAGNOSIS — I10 ESSENTIAL HYPERTENSION: Primary | ICD-10-CM

## 2024-05-28 DIAGNOSIS — J30.89 NON-SEASONAL ALLERGIC RHINITIS, UNSPECIFIED TRIGGER: ICD-10-CM

## 2024-05-28 DIAGNOSIS — G89.29 CHRONIC LEFT SHOULDER PAIN: ICD-10-CM

## 2024-05-28 DIAGNOSIS — E78.2 MIXED HYPERLIPIDEMIA: ICD-10-CM

## 2024-05-28 DIAGNOSIS — K21.9 GASTROESOPHAGEAL REFLUX DISEASE WITHOUT ESOPHAGITIS: ICD-10-CM

## 2024-05-28 PROCEDURE — G2211 COMPLEX E/M VISIT ADD ON: HCPCS | Performed by: INTERNAL MEDICINE

## 2024-05-28 PROCEDURE — 99214 OFFICE O/P EST MOD 30 MIN: CPT | Performed by: INTERNAL MEDICINE

## 2024-05-28 RX ORDER — OLMESARTAN MEDOXOMIL 20 MG/1
20 TABLET ORAL 2 TIMES DAILY
Qty: 180 TABLET | Refills: 1 | Status: SHIPPED | OUTPATIENT
Start: 2024-05-28

## 2024-05-28 RX ORDER — OLMESARTAN MEDOXOMIL 20 MG/1
20 TABLET ORAL 2 TIMES DAILY
Qty: 180 TABLET | Refills: 1 | Status: SHIPPED | OUTPATIENT
Start: 2024-05-28 | End: 2024-05-28 | Stop reason: SDUPTHER

## 2024-05-28 NOTE — PROGRESS NOTES
Assessment/Plan:           1. Essential hypertension  Comments:  Stable Home readings.  Continue amlodipine and olmesartan.  Orders:  -     olmesartan (BENICAR) 20 mg tablet; Take 1 tablet (20 mg total) by mouth 2 (two) times a day  2. Age-related osteoporosis without current pathological fracture  Comments:  Repeat DEXA scan is pending.  3. Non-seasonal allergic rhinitis, unspecified trigger  Comments:  Symptoms are better.  Continue current regimen.  4. Gastroesophageal reflux disease without esophagitis  5. Vitamin D deficiency  6. Mixed hyperlipidemia  7. Chronic left shoulder pain  Comments:  Conservative management discussed.         1. Essential hypertension      2. Age-related osteoporosis without current pathological fracture      3. Non-seasonal allergic rhinitis, unspecified trigger      4. Gastroesophageal reflux disease without esophagitis         No problem-specific Assessment & Plan notes found for this encounter.           Subjective:      Patient ID: Ralf Ayala is a 83 y.o. female.    HPI    The following portions of the patient's history were reviewed and updated as appropriate: She  has a past medical history of Aortic insufficiency, Cee's esophagus, Gastroesophageal reflux disease, Glaucoma, Hypercholesterolemia, Hyperlipidemia, Hypertension, Kidney stones (2009), Low back pain, Major depressive disorder, Mitral valve insufficiency, Osteoporosis, Osteoporosis, and Vitamin D deficiency.  She   Patient Active Problem List    Diagnosis Date Noted    Age-related osteoporosis without current pathological fracture 03/21/2023    Fall 06/16/2022    Pancreatic cyst 03/19/2021    Vitamin D deficiency 03/19/2021    Dysthymia 03/19/2021    Gastroesophageal reflux disease without esophagitis 03/19/2021    Essential hypertension 01/19/2018    Hyperlipemia 01/19/2018    Anxiety 01/19/2018    Bradycardia 01/19/2018    Dizziness 01/19/2018     She  has a past surgical history that includes Cystoscopy  (2009); Other surgical history; Colonoscopy (06/02/2011); and Mammo (historical) (09/19/2016).  Her family history includes Cancer (age of onset: 90) in her mother; Lung cancer (age of onset: 65) in her brother; Lung cancer (age of onset: 70) in her brother; No Known Problems in her daughter, daughter, father, maternal grandfather, maternal grandmother, paternal grandfather, paternal grandmother, sister, and son.  She  reports that she has never smoked. She has never used smokeless tobacco. She reports that she does not currently use alcohol. She reports that she does not use drugs.  Current Outpatient Medications   Medication Sig Dispense Refill    acetaminophen (TYLENOL) 325 mg tablet As needed body aches      amLODIPine (NORVASC) 2.5 mg tablet Take 1 tablet (2.5 mg total) by mouth daily 90 tablet 1    Ascorbic Acid (vitamin C) 1000 MG tablet Take 1,000 mg by mouth daily      cetirizine (ZyrTEC) 10 mg tablet Take 1 tablet (10 mg total) by mouth daily 30 tablet 2    cholecalciferol (VITAMIN D3) 25 mcg (1,000 units) tablet Take 1,000 Units by mouth daily      citalopram (CeleXA) 20 mg tablet TAKE 1/2 OR 1 TABLET DAILY AS DIRECTED BY DOCTOR 90 tablet 3    dorzolamide (TRUSOPT) 2 % ophthalmic solution       famotidine (PEPCID) 20 mg tablet Take 1 tablet (20 mg total) by mouth 2 (two) times a day 180 tablet 1    fluticasone (FLONASE) 50 mcg/act nasal spray 1 spray into each nostril daily 10 mL 2    Lumigan 0.01 % ophthalmic drops       montelukast (SINGULAIR) 10 mg tablet Take 1 tablet (10 mg total) by mouth daily at bedtime 30 tablet 2    Multiple Vitamin tablet Take by mouth      olmesartan (BENICAR) 20 mg tablet Take 1 tablet (20 mg total) by mouth 2 (two) times a day 180 tablet 1    Travatan Z 0.004 % ophthalmic solution       aspirin (ECOTRIN LOW STRENGTH) 81 mg EC tablet Take 81 mg by mouth daily Sometimes (Patient not taking: Reported on 3/21/2023)      omeprazole (PriLOSEC) 20 mg delayed release capsule   (Patient not taking: Reported on 3/11/2024)      rosuvastatin (CRESTOR) 5 mg tablet TAKE 1 TABLET (5 MG TOTAL) BY MOUTH DAILY AT BEDTIME (Patient not taking: Reported on 3/21/2023) 90 tablet 1    sodium chloride (OCEAN) 0.65 % nasal spray 1 spray into each nostril every 2 (two) hours while awake (Patient not taking: Reported on 3/21/2023) 60 mL 1     No current facility-administered medications for this visit.     Current Outpatient Medications on File Prior to Visit   Medication Sig    acetaminophen (TYLENOL) 325 mg tablet As needed body aches    amLODIPine (NORVASC) 2.5 mg tablet Take 1 tablet (2.5 mg total) by mouth daily    Ascorbic Acid (vitamin C) 1000 MG tablet Take 1,000 mg by mouth daily    cetirizine (ZyrTEC) 10 mg tablet Take 1 tablet (10 mg total) by mouth daily    cholecalciferol (VITAMIN D3) 25 mcg (1,000 units) tablet Take 1,000 Units by mouth daily    citalopram (CeleXA) 20 mg tablet TAKE 1/2 OR 1 TABLET DAILY AS DIRECTED BY DOCTOR    dorzolamide (TRUSOPT) 2 % ophthalmic solution     famotidine (PEPCID) 20 mg tablet Take 1 tablet (20 mg total) by mouth 2 (two) times a day    fluticasone (FLONASE) 50 mcg/act nasal spray 1 spray into each nostril daily    Lumigan 0.01 % ophthalmic drops     montelukast (SINGULAIR) 10 mg tablet Take 1 tablet (10 mg total) by mouth daily at bedtime    Multiple Vitamin tablet Take by mouth    Travatan Z 0.004 % ophthalmic solution     [DISCONTINUED] olmesartan (BENICAR) 20 mg tablet TAKE 1 TABLET (20 MG TOTAL) BY MOUTH 2 (TWO) TIMES A DAY    aspirin (ECOTRIN LOW STRENGTH) 81 mg EC tablet Take 81 mg by mouth daily Sometimes (Patient not taking: Reported on 3/21/2023)    omeprazole (PriLOSEC) 20 mg delayed release capsule  (Patient not taking: Reported on 3/11/2024)    rosuvastatin (CRESTOR) 5 mg tablet TAKE 1 TABLET (5 MG TOTAL) BY MOUTH DAILY AT BEDTIME (Patient not taking: Reported on 3/21/2023)    sodium chloride (OCEAN) 0.65 % nasal spray 1 spray into each nostril  "every 2 (two) hours while awake (Patient not taking: Reported on 3/21/2023)     No current facility-administered medications on file prior to visit.     Medications Discontinued During This Encounter   Medication Reason    olmesartan (BENICAR) 20 mg tablet Reorder    olmesartan (BENICAR) 20 mg tablet Reorder      She has No Known Allergies..    Review of Systems   Constitutional:  Negative for appetite change, chills, fatigue and fever.   HENT:  Negative for sore throat and trouble swallowing.    Eyes:  Negative for redness.   Respiratory:  Negative for shortness of breath.    Cardiovascular:  Negative for chest pain and palpitations.   Gastrointestinal:  Negative for abdominal pain, constipation and diarrhea.   Genitourinary:  Negative for dysuria and hematuria.   Musculoskeletal:  Positive for arthralgias. Negative for back pain and neck pain.   Skin:  Negative for rash.   Neurological:  Negative for seizures, weakness and headaches.   Hematological:  Negative for adenopathy.   Psychiatric/Behavioral:  Negative for confusion. The patient is not nervous/anxious.          Objective:      /70   Pulse 55   Temp 97.9 °F (36.6 °C) (Temporal)   Ht 5' 1\" (1.549 m)   Wt 64 kg (141 lb)   SpO2 97%   BMI 26.64 kg/m²     Results Reviewed       None            Recent Results (from the past 1344 hour(s))   UA (URINE) with reflex to Scope    Collection Time: 05/22/24  8:46 AM   Result Value Ref Range    Color, UA Light Yellow     Clarity, UA Clear     Specific Gravity, UA 1.017 1.003 - 1.030    pH, UA 5.5 4.5, 5.0, 5.5, 6.0, 6.5, 7.0, 7.5, 8.0    Leukocytes, UA Negative Negative    Nitrite, UA Negative Negative    Protein, UA Negative Negative mg/dl    Glucose, UA Negative Negative mg/dl    Ketones, UA Negative Negative mg/dl    Urobilinogen, UA <2.0 <2.0 mg/dl mg/dl    Bilirubin, UA Negative Negative    Occult Blood, UA Negative Negative   CBC and differential    Collection Time: 05/22/24  8:46 AM   Result Value Ref " Range    WBC 5.35 4.31 - 10.16 Thousand/uL    RBC 4.04 3.81 - 5.12 Million/uL    Hemoglobin 13.1 11.5 - 15.4 g/dL    Hematocrit 40.7 34.8 - 46.1 %     (H) 82 - 98 fL    MCH 32.4 26.8 - 34.3 pg    MCHC 32.2 31.4 - 37.4 g/dL    RDW 12.8 11.6 - 15.1 %    MPV 10.7 8.9 - 12.7 fL    Platelets 205 149 - 390 Thousands/uL    nRBC 0 /100 WBCs    Segmented % 44 43 - 75 %    Immature Grans % 0 0 - 2 %    Lymphocytes % 46 (H) 14 - 44 %    Monocytes % 7 4 - 12 %    Eosinophils Relative 2 0 - 6 %    Basophils Relative 1 0 - 1 %    Absolute Neutrophils 2.33 1.85 - 7.62 Thousands/µL    Absolute Immature Grans 0.01 0.00 - 0.20 Thousand/uL    Absolute Lymphocytes 2.46 0.60 - 4.47 Thousands/µL    Absolute Monocytes 0.38 0.17 - 1.22 Thousand/µL    Eosinophils Absolute 0.12 0.00 - 0.61 Thousand/µL    Basophils Absolute 0.05 0.00 - 0.10 Thousands/µL   Comprehensive metabolic panel    Collection Time: 05/22/24  8:46 AM   Result Value Ref Range    Sodium 140 135 - 147 mmol/L    Potassium 4.1 3.5 - 5.3 mmol/L    Chloride 107 96 - 108 mmol/L    CO2 23 21 - 32 mmol/L    ANION GAP 10 4 - 13 mmol/L    BUN 16 5 - 25 mg/dL    Creatinine 0.78 0.60 - 1.30 mg/dL    Glucose, Fasting 115 (H) 65 - 99 mg/dL    Calcium 9.3 8.4 - 10.2 mg/dL    AST 22 13 - 39 U/L    ALT 17 7 - 52 U/L    Alkaline Phosphatase 46 34 - 104 U/L    Total Protein 7.5 6.4 - 8.4 g/dL    Albumin 4.1 3.5 - 5.0 g/dL    Total Bilirubin 0.44 0.20 - 1.00 mg/dL    eGFR 70 ml/min/1.73sq m   Lipid panel    Collection Time: 05/22/24  8:46 AM   Result Value Ref Range    Cholesterol 277 (H) See Comment mg/dL    Triglycerides 236 (H) See Comment mg/dL    HDL, Direct 62 >=50 mg/dL    LDL Calculated 168 (H) 0 - 100 mg/dL    Non-HDL-Chol (CHOL-HDL) 215 mg/dl        Physical Exam  Constitutional:       General: She is not in acute distress.     Appearance: Normal appearance.   HENT:      Head: Normocephalic and atraumatic.      Right Ear: Tympanic membrane normal.      Left Ear: Tympanic  membrane normal.      Nose: Nose normal.      Mouth/Throat:      Mouth: Mucous membranes are moist.   Eyes:      Extraocular Movements: Extraocular movements intact.      Pupils: Pupils are equal, round, and reactive to light.   Cardiovascular:      Rate and Rhythm: Normal rate and regular rhythm.      Pulses: Normal pulses.      Heart sounds: Normal heart sounds. No murmur heard.     No friction rub.   Pulmonary:      Effort: Pulmonary effort is normal. No respiratory distress.      Breath sounds: Normal breath sounds. No wheezing.   Abdominal:      General: Abdomen is flat. Bowel sounds are normal. There is no distension.      Palpations: Abdomen is soft. There is no mass.      Tenderness: There is no abdominal tenderness. There is no guarding.   Musculoskeletal:         General: Normal range of motion.      Cervical back: Normal range of motion.   Skin:     General: Skin is warm.   Neurological:      General: No focal deficit present.      Mental Status: She is alert and oriented to person, place, and time. Mental status is at baseline.      Cranial Nerves: No cranial nerve deficit.   Psychiatric:         Mood and Affect: Mood normal.         Behavior: Behavior normal.

## 2024-09-10 ENCOUNTER — OFFICE VISIT (OUTPATIENT)
Dept: INTERNAL MEDICINE CLINIC | Facility: CLINIC | Age: 84
End: 2024-09-10
Payer: COMMERCIAL

## 2024-09-10 VITALS
DIASTOLIC BLOOD PRESSURE: 70 MMHG | HEIGHT: 61 IN | SYSTOLIC BLOOD PRESSURE: 130 MMHG | OXYGEN SATURATION: 97 % | WEIGHT: 136 LBS | HEART RATE: 55 BPM | TEMPERATURE: 97.3 F | BODY MASS INDEX: 25.68 KG/M2

## 2024-09-10 DIAGNOSIS — I10 ESSENTIAL HYPERTENSION: Primary | ICD-10-CM

## 2024-09-10 DIAGNOSIS — E78.2 MIXED HYPERLIPIDEMIA: ICD-10-CM

## 2024-09-10 DIAGNOSIS — R05.9 COUGH IN ADULT: ICD-10-CM

## 2024-09-10 DIAGNOSIS — Z00.00 MEDICARE ANNUAL WELLNESS VISIT, SUBSEQUENT: ICD-10-CM

## 2024-09-10 DIAGNOSIS — K21.9 GASTROESOPHAGEAL REFLUX DISEASE WITHOUT ESOPHAGITIS: ICD-10-CM

## 2024-09-10 DIAGNOSIS — M81.0 AGE-RELATED OSTEOPOROSIS WITHOUT CURRENT PATHOLOGICAL FRACTURE: ICD-10-CM

## 2024-09-10 PROCEDURE — 99214 OFFICE O/P EST MOD 30 MIN: CPT | Performed by: INTERNAL MEDICINE

## 2024-09-10 PROCEDURE — G0439 PPPS, SUBSEQ VISIT: HCPCS | Performed by: INTERNAL MEDICINE

## 2024-09-10 RX ORDER — DEXTROMETHORPHAN POLISTIREX 30 MG/5ML
5 SUSPENSION ORAL 2 TIMES DAILY PRN
Qty: 120 ML | Refills: 0 | Status: SHIPPED | OUTPATIENT
Start: 2024-09-10

## 2024-09-10 NOTE — PROGRESS NOTES
Ambulatory Visit  Name: Ralf Ayala      : 1940      MRN: [id number]  Encounter Provider: Juan Winters MD  Encounter Date: 9/10/2024   Encounter department: [facility] INTERNAL MEDICINE [facility]    Assessment & Plan  Essential hypertension         Age-related osteoporosis without current pathological fracture    Orders:    Ambulatory Referral to Rheumatology; Future    Mixed hyperlipidemia         Cough in adult    Orders:    Dextromethorphan Polistirex ER (Delsym) 30 MG/5ML SUER; Take 5 mL (30 mg total) by mouth 2 (two) times a day as needed (cough)    Gastroesophageal reflux disease without esophagitis         Medicare annual wellness visit, subsequent           1. Essential hypertension      Continue amlodipine 2.5 mg daily and olmesartan 10 mg daily.      2. Age-related osteoporosis without current pathological fracture  Ambulatory Referral to Rheumatology    Continue calcium vitamin D and antiresorptive medication as per rheumatology follow-up.      3. Mixed hyperlipidemia      Continue rosuvastatin 5 mg daily.      4. Cough in adult  Dextromethorphan Polistirex ER (Delsym) 30 MG/5ML SUER      5. Gastroesophageal reflux disease without esophagitis      Continue nonulcer diet and famotidine.      6. Medicare annual wellness visit, subsequent              Preventive health issues were discussed with patient, and age appropriate screening tests were ordered as noted in patient's After Visit Summary. Personalized health advice and appropriate referrals for health education or preventive services given if needed, as noted in patient's After Visit Summary.    History of Present Illness     Hypertension  Pertinent negatives include no chest pain, headaches, neck pain, palpitations or shortness of breath.      Patient Care Team:  Juan Winters MD as PCP - General    Review of Systems   Constitutional:  Negative for appetite change, chills, fatigue and fever.   HENT:  Negative for sore  throat and trouble swallowing.    Eyes:  Negative for redness.   Respiratory:  Positive for cough. Negative for shortness of breath.    Cardiovascular:  Negative for chest pain and palpitations.   Gastrointestinal:  Negative for abdominal pain, constipation and diarrhea.   Genitourinary:  Negative for dysuria and hematuria.   Musculoskeletal:  Negative for back pain and neck pain.   Skin:  Negative for rash.   Neurological:  Negative for seizures, weakness and headaches.   Hematological:  Negative for adenopathy.   Psychiatric/Behavioral:  Negative for confusion. The patient is not nervous/anxious.      Medical History Reviewed by provider this encounter:  Tobacco  Allergies  Meds  Problems  Med Hx  Surg Hx  Fam Hx       Annual Wellness Visit Questionnaire   Ralf is here for her Subsequent Wellness visit. Last Medicare Wellness visit information reviewed, patient interviewed and updates made to the record today.      Health Risk Assessment:   Patient rates overall health as good. Patient feels that their physical health rating is slightly better. Patient is satisfied with their life. Eyesight was rated as same. Hearing was rated as same. Patient feels that their emotional and mental health rating is same. Patients states they are never, rarely angry. Patient states they are sometimes unusually tired/fatigued. Pain experienced in the last 7 days has been some. Patient's pain rating has been 3/10. Patient states that she has experienced no weight loss or gain in last 6 months.     Depression Screening:   PHQ-9 Score: 0      Fall Risk Screening:   In the past year, patient has experienced: history of falling in past year    Number of falls: 1  Injured during fall?: Yes    Feels unsteady when standing or walking?: No    Worried about falling?: No      Urinary Incontinence Screening:   Patient has not leaked urine accidently in the last six months.     Home Safety:  Patient has trouble with stairs inside or  outside of their home. Patient has working smoke alarms and has working carbon monoxide detector. Home safety hazards include: none.     Nutrition:   Current diet is Regular.     Medications:   Patient is not currently taking any over-the-counter supplements. Patient is able to manage medications.     Activities of Daily Living (ADLs)/Instrumental Activities of Daily Living (IADLs):   Walk and transfer into and out of bed and chair?: Yes  Dress and groom yourself?: Yes    Bathe or shower yourself?: Yes    Feed yourself? Yes  Do your laundry/housekeeping?: Yes  Manage your money, pay your bills and track your expenses?: Yes  Make your own meals?: Yes    Do your own shopping?: Yes    Previous Hospitalizations:   Any hospitalizations or ED visits within the last 12 months?: Yes    How many hospitalizations have you had in the last year?: 1-2    Advance Care Planning:   Living will: Yes    Durable POA for healthcare: No    Advanced directive: Yes      PREVENTIVE SCREENINGS      Cardiovascular Screening:    General: Screening Not Indicated and History Lipid Disorder      Diabetes Screening:     General: Screening Current      Colorectal Cancer Screening:     General: Screening Not Indicated      Breast Cancer Screening:     General: Screening Current      Cervical Cancer Screening:    General: Screening Not Indicated      Osteoporosis Screening:    General: Screening Not Indicated and History Osteoporosis      Lung Cancer Screening:     General: Screening Not Indicated    Screening, Brief Intervention, and Referral to Treatment (SBIRT)    Screening  Typical number of drinks in a day: 0  Typical number of drinks in a week: 0  Interpretation: Low risk drinking behavior.    Single Item Drug Screening:  How often have you used an illegal drug (including marijuana) or a prescription medication for non-medical reasons in the past year? never    Single Item Drug Screen Score: 0  Interpretation: Negative screen for possible drug  "use disorder    Other Counseling Topics:   Car/seat belt/driving safety, skin self-exam, sunscreen and calcium and vitamin D intake and regular weightbearing exercise.     Social Determinants of Health     Financial Resource Strain: Low Risk  (8/29/2023)    Overall Financial Resource Strain (CARDIA)     Difficulty of Paying Living Expenses: Not hard at all   Food Insecurity: No Food Insecurity (9/10/2024)    Hunger Vital Sign     Worried About Running Out of Food in the Last Year: Never true     Ran Out of Food in the Last Year: Never true   Transportation Needs: No Transportation Needs (9/10/2024)    PRAPARE - Transportation     Lack of Transportation (Medical): No     Lack of Transportation (Non-Medical): No   Housing Stability: Low Risk  (9/10/2024)    Housing Stability Vital Sign     Unable to Pay for Housing in the Last Year: No     Number of Times Moved in the Last Year: 1     Homeless in the Last Year: No   Utilities: Not At Risk (9/10/2024)    [facility] Utilities     Threatened with loss of utilities: No     No results found.    Objective     /70   Pulse 55   Temp (!) 97.3 °F (36.3 °C) (Temporal)   Ht 5' 1\" (1.549 m)   Wt 61.7 kg (136 lb)   SpO2 97%   BMI 25.70 kg/m²     Physical Exam  Vitals and nursing note reviewed.   Constitutional:       General: She is not in acute distress.     Appearance: She is well-developed.   HENT:      Head: Normocephalic and atraumatic.   Eyes:      Conjunctiva/sclera: Conjunctivae normal.   Cardiovascular:      Rate and Rhythm: Normal rate and regular rhythm.      Heart sounds: No murmur heard.  Pulmonary:      Effort: Pulmonary effort is normal. No respiratory distress.      Breath sounds: Normal breath sounds.   Abdominal:      Palpations: Abdomen is soft.      Tenderness: There is no abdominal tenderness.   Musculoskeletal:         General: No swelling.      Cervical back: Normal range of motion and neck supple.   Skin:     General: Skin is warm and dry.      " Capillary Refill: Capillary refill takes less than 2 seconds.   Neurological:      Mental Status: She is alert.   Psychiatric:         Mood and Affect: Mood normal.

## 2024-09-10 NOTE — PATIENT INSTRUCTIONS
Medicare Preventive Visit Patient Instructions  Thank you for completing your Welcome to Medicare Visit or Medicare Annual Wellness Visit today. Your next wellness visit will be due in one year (9/11/2025).  The screening/preventive services that you may require over the next 5-10 years are detailed below. Some tests may not apply to you based off risk factors and/or age. Screening tests ordered at today's visit but not completed yet may show as past due. Also, please note that scanned in results may not display below.  Preventive Screenings:  Service Recommendations Previous Testing/Comments   Colorectal Cancer Screening  * Colonoscopy    * Fecal Occult Blood Test (FOBT)/Fecal Immunochemical Test (FIT)  * Fecal DNA/Cologuard Test  * Flexible Sigmoidoscopy Age: 45-75 years old   Colonoscopy: every 10 years (may be performed more frequently if at higher risk)  OR  FOBT/FIT: every 1 year  OR  Cologuard: every 3 years  OR  Sigmoidoscopy: every 5 years  Screening may be recommended earlier than age 45 if at higher risk for colorectal cancer. Also, an individualized decision between you and your healthcare provider will decide whether screening between the ages of 76-85 would be appropriate. Colonoscopy: Not on file  FOBT/FIT: Not on file  Cologuard: Not on file  Sigmoidoscopy: Not on file    Screening Not Indicated     Breast Cancer Screening Age: 40+ years old  Frequency: every 1-2 years  Not required if history of left and right mastectomy Mammogram: 07/11/2023    Screening Current   Cervical Cancer Screening Between the ages of 21-29, pap smear recommended once every 3 years.   Between the ages of 30-65, can perform pap smear with HPV co-testing every 5 years.   Recommendations may differ for women with a history of total hysterectomy, cervical cancer, or abnormal pap smears in past. Pap Smear: 05/12/2021    Screening Not Indicated   Hepatitis C Screening Once for adults born between 1945 and 1965  More frequently in  patients at high risk for Hepatitis C Hep C Antibody: Not on file        Diabetes Screening 1-2 times per year if you're at risk for diabetes or have pre-diabetes Fasting glucose: 115 mg/dL (5/22/2024)  A1C: No results in last 5 years (No results in last 5 years)  Screening Current   Cholesterol Screening Once every 5 years if you don't have a lipid disorder. May order more often based on risk factors. Lipid panel: 05/22/2024    Screening Not Indicated  History Lipid Disorder     Other Preventive Screenings Covered by Medicare:  Abdominal Aortic Aneurysm (AAA) Screening: covered once if your at risk. You're considered to be at risk if you have a family history of AAA.  Lung Cancer Screening: covers low dose CT scan once per year if you meet all of the following conditions: (1) Age 55-77; (2) No signs or symptoms of lung cancer; (3) Current smoker or have quit smoking within the last 15 years; (4) You have a tobacco smoking history of at least 20 pack years (packs per day multiplied by number of years you smoked); (5) You get a written order from a healthcare provider.  Glaucoma Screening: covered annually if you're considered high risk: (1) You have diabetes OR (2) Family history of glaucoma OR (3)  aged 50 and older OR (4)  American aged 65 and older  Osteoporosis Screening: covered every 2 years if you meet one of the following conditions: (1) You're estrogen deficient and at risk for osteoporosis based off medical history and other findings; (2) Have a vertebral abnormality; (3) On glucocorticoid therapy for more than 3 months; (4) Have primary hyperparathyroidism; (5) On osteoporosis medications and need to assess response to drug therapy.   Last bone density test (DXA Scan): 11/17/2021.  HIV Screening: covered annually if you're between the age of 15-65. Also covered annually if you are younger than 15 and older than 65 with risk factors for HIV infection. For pregnant patients, it is  covered up to 3 times per pregnancy.    Immunizations:  Immunization Recommendations   Influenza Vaccine Annual influenza vaccination during flu season is recommended for all persons aged >= 6 months who do not have contraindications   Pneumococcal Vaccine   * Pneumococcal conjugate vaccine = PCV13 (Prevnar 13), PCV15 (Vaxneuvance), PCV20 (Prevnar 20)  * Pneumococcal polysaccharide vaccine = PPSV23 (Pneumovax) Adults 19-65 yo with certain risk factors or if 65+ yo  If never received any pneumonia vaccine: recommend Prevnar 20 (PCV20)  Give PCV20 if previously received 1 dose of PCV13 or PPSV23   Hepatitis B Vaccine 3 dose series if at intermediate or high risk (ex: diabetes, end stage renal disease, liver disease)   Respiratory syncytial virus (RSV) Vaccine - COVERED BY MEDICARE PART D  * RSVPreF3 (Arexvy) CDC recommends that adults 60 years of age and older may receive a single dose of RSV vaccine using shared clinical decision-making (SCDM)   Tetanus (Td) Vaccine - COST NOT COVERED BY MEDICARE PART B Following completion of primary series, a booster dose should be given every 10 years to maintain immunity against tetanus. Td may also be given as tetanus wound prophylaxis.   Tdap Vaccine - COST NOT COVERED BY MEDICARE PART B Recommended at least once for all adults. For pregnant patients, recommended with each pregnancy.   Shingles Vaccine (Shingrix) - COST NOT COVERED BY MEDICARE PART B  2 shot series recommended in those 19 years and older who have or will have weakened immune systems or those 50 years and older     Health Maintenance Due:  There are no preventive care reminders to display for this patient.  Immunizations Due:      Topic Date Due   • Pneumococcal Vaccine: 65+ Years (2 of 2 - PCV) 06/24/2012   • COVID-19 Vaccine (3 - 2023-24 season) 09/01/2024   • Influenza Vaccine (1) 09/01/2024     Advance Directives   What are advance directives?  Advance directives are legal documents that state your wishes  and plans for medical care. These plans are made ahead of time in case you lose your ability to make decisions for yourself. Advance directives can apply to any medical decision, such as the treatments you want, and if you want to donate organs.   What are the types of advance directives?  There are many types of advance directives, and each state has rules about how to use them. You may choose a combination of any of the following:  Living will:  This is a written record of the treatment you want. You can also choose which treatments you do not want, which to limit, and which to stop at a certain time. This includes surgery, medicine, IV fluid, and tube feedings.   Durable power of  for healthcare (DPAHC):  This is a written record that states who you want to make healthcare choices for you when you are unable to make them for yourself. This person, called a proxy, is usually a family member or a friend. You may choose more than 1 proxy.  Do not resuscitate (DNR) order:  A DNR order is used in case your heart stops beating or you stop breathing. It is a request not to have certain forms of treatment, such as CPR. A DNR order may be included in other types of advance directives.  Medical directive:  This covers the care that you want if you are in a coma, near death, or unable to make decisions for yourself. You can list the treatments you want for each condition. Treatment may include pain medicine, surgery, blood transfusions, dialysis, IV or tube feedings, and a ventilator (breathing machine).  Values history:  This document has questions about your views, beliefs, and how you feel and think about life. This information can help others choose the care that you would choose.  Why are advance directives important?  An advance directive helps you control your care. Although spoken wishes may be used, it is better to have your wishes written down. Spoken wishes can be misunderstood, or not followed.  Treatments may be given even if you do not want them. An advance directive may make it easier for your family to make difficult choices about your care.   Fall Prevention    Fall prevention  includes ways to make your home and other areas safer. It also includes ways you can move more carefully to prevent a fall. Health conditions that cause changes in your blood pressure, vision, or muscle strength and coordination may increase your risk for falls. Medicines may also increase your risk for falls if they make you dizzy, weak, or sleepy.   Fall prevention tips:   Stand or sit up slowly.    Use assistive devices as directed.    Wear shoes that fit well and have soles that .    Wear a personal alarm.    Stay active.    Manage your medical conditions.    Home Safety Tips:  Add items to prevent falls in the bathroom.    Keep paths clear.    Install bright lights in your home.    Keep items you use often on shelves within reach.    Paint or place reflective tape on the edges of your stairs.    Weight Management   Why it is important to manage your weight:  Being overweight increases your risk of health conditions such as heart disease, high blood pressure, type 2 diabetes, and certain types of cancer. It can also increase your risk for osteoarthritis, sleep apnea, and other respiratory problems. Aim for a slow, steady weight loss. Even a small amount of weight loss can lower your risk of health problems.  How to lose weight safely:  A safe and healthy way to lose weight is to eat fewer calories and get regular exercise. You can lose up about 1 pound a week by decreasing the number of calories you eat by 500 calories each day.   Healthy meal plan for weight management:  A healthy meal plan includes a variety of foods, contains fewer calories, and helps you stay healthy. A healthy meal plan includes the following:  Eat whole-grain foods more often.  A healthy meal plan should contain fiber. Fiber is the part of grains,  fruits, and vegetables that is not broken down by your body. Whole-grain foods are healthy and provide extra fiber in your diet. Some examples of whole-grain foods are whole-wheat breads and pastas, oatmeal, brown rice, and bulgur.  Eat a variety of vegetables every day.  Include dark, leafy greens such as spinach, kale, tonja greens, and mustard greens. Eat yellow and orange vegetables such as carrots, sweet potatoes, and winter squash.   Eat a variety of fruits every day.  Choose fresh or canned fruit (canned in its own juice or light syrup) instead of juice. Fruit juice has very little or no fiber.  Eat low-fat dairy foods.  Drink fat-free (skim) milk or 1% milk. Eat fat-free yogurt and low-fat cottage cheese. Try low-fat cheeses such as mozzarella and other reduced-fat cheeses.  Choose meat and other protein foods that are low in fat.  Choose beans or other legumes such as split peas or lentils. Choose fish, skinless poultry (chicken or turkey), or lean cuts of red meat (beef or pork). Before you cook meat or poultry, cut off any visible fat.   Use less fat and oil.  Try baking foods instead of frying them. Add less fat, such as margarine, sour cream, regular salad dressing and mayonnaise to foods. Eat fewer high-fat foods. Some examples of high-fat foods include french fries, doughnuts, ice cream, and cakes.  Eat fewer sweets.  Limit foods and drinks that are high in sugar. This includes candy, cookies, regular soda, and sweetened drinks.  Exercise:  Exercise at least 30 minutes per day on most days of the week. Some examples of exercise include walking, biking, dancing, and swimming. You can also fit in more physical activity by taking the stairs instead of the elevator or parking farther away from stores. Ask your healthcare provider about the best exercise plan for you.      © Copyright [employer] 2018 Information is for End User's use only and may not be sold, redistributed or otherwise used for  commercial purposes. All illustrations and images included in CareNotes® are the copyrighted property of [email or web address] or [employer]

## 2024-10-08 ENCOUNTER — OFFICE VISIT (OUTPATIENT)
Dept: INTERNAL MEDICINE CLINIC | Facility: CLINIC | Age: 84
End: 2024-10-08
Payer: COMMERCIAL

## 2024-10-08 VITALS
BODY MASS INDEX: 26.06 KG/M2 | TEMPERATURE: 96 F | HEIGHT: 61 IN | DIASTOLIC BLOOD PRESSURE: 76 MMHG | WEIGHT: 138 LBS | SYSTOLIC BLOOD PRESSURE: 180 MMHG | OXYGEN SATURATION: 97 % | HEART RATE: 69 BPM

## 2024-10-08 DIAGNOSIS — I10 ESSENTIAL HYPERTENSION: ICD-10-CM

## 2024-10-08 DIAGNOSIS — R05.9 COUGH IN ADULT: Primary | ICD-10-CM

## 2024-10-08 DIAGNOSIS — U07.1 COVID-19: ICD-10-CM

## 2024-10-08 LAB
SARS-COV-2 AG UPPER RESP QL IA: POSITIVE
SL AMB POCT RAPID FLU A: NORMAL
SL AMB POCT RAPID FLU B: NORMAL
VALID CONTROL: ABNORMAL

## 2024-10-08 PROCEDURE — 87804 INFLUENZA ASSAY W/OPTIC: CPT | Performed by: INTERNAL MEDICINE

## 2024-10-08 PROCEDURE — G2211 COMPLEX E/M VISIT ADD ON: HCPCS | Performed by: INTERNAL MEDICINE

## 2024-10-08 PROCEDURE — 99214 OFFICE O/P EST MOD 30 MIN: CPT | Performed by: INTERNAL MEDICINE

## 2024-10-08 PROCEDURE — 87811 SARS-COV-2 COVID19 W/OPTIC: CPT | Performed by: INTERNAL MEDICINE

## 2024-10-08 RX ORDER — NIRMATRELVIR AND RITONAVIR 300-100 MG
3 KIT ORAL 2 TIMES DAILY
Qty: 30 TABLET | Refills: 0 | Status: SHIPPED | OUTPATIENT
Start: 2024-10-08 | End: 2024-10-13

## 2024-10-08 NOTE — PROGRESS NOTES
Assessment/Plan:           1. Cough in adult  -     POCT Rapid Covid Ag  -     POCT rapid flu A and B  -     nirmatrelvir & ritonavir (Paxlovid, 300/100,) tablet therapy pack; Take 3 tablets by mouth 2 (two) times a day for 5 days  -     XR chest pa and lateral; Future; Expected date: 10/08/2024  2. COVID-19  Comments:  Paxlovid was prescribed.  Vitamin C 500 mg zinc 50 mg and vitamin D 1000 units daily all for 14 days.  Plenty of hydration with water and electrolytes  Orders:  -     XR chest pa and lateral; Future; Expected date: 10/08/2024  3. Essential hypertension  Comments:  Continue amlodipine and olmesartan.         1. Cough in adult    - POCT Rapid Covid Ag  - POCT rapid flu A and B  - nirmatrelvir & ritonavir (Paxlovid, 300/100,) tablet therapy pack; Take 3 tablets by mouth 2 (two) times a day for 5 days  Dispense: 30 tablet; Refill: 0       No problem-specific Assessment & Plan notes found for this encounter.           Subjective:      Patient ID: Ralf Ayala is a 84 y.o. female.    HPI    The following portions of the patient's history were reviewed and updated as appropriate: She  has a past medical history of Aortic insufficiency, Cee's esophagus, Gastroesophageal reflux disease, Glaucoma, Hypercholesterolemia, Hyperlipidemia, Hypertension, Kidney stones (2009), Low back pain, Major depressive disorder, Mitral valve insufficiency, Osteoporosis, Osteoporosis, and Vitamin D deficiency.  She   Patient Active Problem List    Diagnosis Date Noted    Age-related osteoporosis without current pathological fracture 03/21/2023    Fall 06/16/2022    Pancreatic cyst 03/19/2021    Vitamin D deficiency 03/19/2021    Dysthymia 03/19/2021    Gastroesophageal reflux disease without esophagitis 03/19/2021    Essential hypertension 01/19/2018    Hyperlipemia 01/19/2018    Anxiety 01/19/2018    Bradycardia 01/19/2018    Dizziness 01/19/2018     She  has a past surgical history that includes Cystoscopy (2009);  Other surgical history; Colonoscopy (06/02/2011); and Mammo (historical) (09/19/2016).  Her family history includes Cancer (age of onset: 90) in her mother; Lung cancer (age of onset: 65) in her brother; Lung cancer (age of onset: 70) in her brother; No Known Problems in her daughter, daughter, father, maternal grandfather, maternal grandmother, paternal grandfather, paternal grandmother, sister, and son.  She  reports that she has never smoked. She has never used smokeless tobacco. She reports that she does not currently use alcohol. She reports that she does not use drugs.  Current Outpatient Medications   Medication Sig Dispense Refill    acetaminophen (TYLENOL) 325 mg tablet As needed body aches      amLODIPine (NORVASC) 2.5 mg tablet Take 1 tablet (2.5 mg total) by mouth daily 90 tablet 1    Ascorbic Acid (vitamin C) 1000 MG tablet Take 1,000 mg by mouth daily      cetirizine (ZyrTEC) 10 mg tablet Take 1 tablet (10 mg total) by mouth daily 30 tablet 2    cholecalciferol (VITAMIN D3) 25 mcg (1,000 units) tablet Take 1,000 Units by mouth daily      citalopram (CeleXA) 20 mg tablet TAKE 1/2 OR 1 TABLET DAILY AS DIRECTED BY DOCTOR 90 tablet 3    Dextromethorphan Polistirex ER (Delsym) 30 MG/5ML SUER Take 5 mL (30 mg total) by mouth 2 (two) times a day as needed (cough) 120 mL 0    dorzolamide (TRUSOPT) 2 % ophthalmic solution       famotidine (PEPCID) 20 mg tablet Take 1 tablet (20 mg total) by mouth 2 (two) times a day 180 tablet 1    fluticasone (FLONASE) 50 mcg/act nasal spray 1 spray into each nostril daily 10 mL 2    Lumigan 0.01 % ophthalmic drops       montelukast (SINGULAIR) 10 mg tablet Take 1 tablet (10 mg total) by mouth daily at bedtime 30 tablet 2    Multiple Vitamin tablet Take by mouth      nirmatrelvir & ritonavir (Paxlovid, 300/100,) tablet therapy pack Take 3 tablets by mouth 2 (two) times a day for 5 days 30 tablet 0    olmesartan (BENICAR) 20 mg tablet Take 1 tablet (20 mg total) by mouth 2  (two) times a day 180 tablet 1    rosuvastatin (CRESTOR) 5 mg tablet TAKE 1 TABLET (5 MG TOTAL) BY MOUTH DAILY AT BEDTIME 90 tablet 1    Travatan Z 0.004 % ophthalmic solution       aspirin (ECOTRIN LOW STRENGTH) 81 mg EC tablet Take 81 mg by mouth daily Sometimes (Patient not taking: Reported on 3/21/2023)      sodium chloride (OCEAN) 0.65 % nasal spray 1 spray into each nostril every 2 (two) hours while awake (Patient not taking: Reported on 3/21/2023) 60 mL 1     No current facility-administered medications for this visit.     Current Outpatient Medications on File Prior to Visit   Medication Sig    acetaminophen (TYLENOL) 325 mg tablet As needed body aches    amLODIPine (NORVASC) 2.5 mg tablet Take 1 tablet (2.5 mg total) by mouth daily    Ascorbic Acid (vitamin C) 1000 MG tablet Take 1,000 mg by mouth daily    cetirizine (ZyrTEC) 10 mg tablet Take 1 tablet (10 mg total) by mouth daily    cholecalciferol (VITAMIN D3) 25 mcg (1,000 units) tablet Take 1,000 Units by mouth daily    citalopram (CeleXA) 20 mg tablet TAKE 1/2 OR 1 TABLET DAILY AS DIRECTED BY DOCTOR    Dextromethorphan Polistirex ER (Delsym) 30 MG/5ML SUER Take 5 mL (30 mg total) by mouth 2 (two) times a day as needed (cough)    dorzolamide (TRUSOPT) 2 % ophthalmic solution     famotidine (PEPCID) 20 mg tablet Take 1 tablet (20 mg total) by mouth 2 (two) times a day    fluticasone (FLONASE) 50 mcg/act nasal spray 1 spray into each nostril daily    Lumigan 0.01 % ophthalmic drops     montelukast (SINGULAIR) 10 mg tablet Take 1 tablet (10 mg total) by mouth daily at bedtime    Multiple Vitamin tablet Take by mouth    olmesartan (BENICAR) 20 mg tablet Take 1 tablet (20 mg total) by mouth 2 (two) times a day    rosuvastatin (CRESTOR) 5 mg tablet TAKE 1 TABLET (5 MG TOTAL) BY MOUTH DAILY AT BEDTIME    Travatan Z 0.004 % ophthalmic solution     aspirin (ECOTRIN LOW STRENGTH) 81 mg EC tablet Take 81 mg by mouth daily Sometimes (Patient not taking: Reported on  "3/21/2023)    sodium chloride (OCEAN) 0.65 % nasal spray 1 spray into each nostril every 2 (two) hours while awake (Patient not taking: Reported on 3/21/2023)     No current facility-administered medications on file prior to visit.     There are no discontinued medications.   She has No Known Allergies..    Review of Systems   Constitutional:  Negative for appetite change, chills, fatigue and fever.   HENT:  Positive for congestion. Negative for sore throat and trouble swallowing.    Eyes:  Negative for redness.   Respiratory:  Positive for cough. Negative for shortness of breath.    Cardiovascular:  Negative for chest pain and palpitations.   Gastrointestinal:  Negative for abdominal pain, constipation and diarrhea.   Genitourinary:  Negative for dysuria and hematuria.   Musculoskeletal:  Negative for back pain and neck pain.   Skin:  Negative for rash.   Neurological:  Negative for seizures, weakness and headaches.   Hematological:  Negative for adenopathy.   Psychiatric/Behavioral:  Negative for confusion. The patient is not nervous/anxious.          Objective:      BP (!) 180/76 (BP Location: Left arm, Patient Position: Sitting, Cuff Size: Standard)   Pulse 69   Temp (!) 96 °F (35.6 °C) (Temporal)   Ht 5' 1\" (1.549 m)   Wt 62.6 kg (138 lb)   SpO2 97%   BMI 26.07 kg/m²     Results Reviewed       None            Recent Results (from the past 1344 hour(s))   POCT Rapid Covid Ag    Collection Time: 10/08/24  2:51 PM   Result Value Ref Range    POCT SARS-CoV-2 Ag Positive (A) Negative    VALID CONTROL Valid    POCT rapid flu A and B    Collection Time: 10/08/24  2:51 PM   Result Value Ref Range    RAPID FLU A neg     RAPID FLU B neg         Physical Exam  Constitutional:       General: She is not in acute distress.     Appearance: Normal appearance.   HENT:      Head: Normocephalic and atraumatic.      Nose: Nose normal.      Mouth/Throat:      Mouth: Mucous membranes are moist.   Eyes:      Extraocular " Movements: Extraocular movements intact.      Pupils: Pupils are equal, round, and reactive to light.   Cardiovascular:      Rate and Rhythm: Normal rate and regular rhythm.      Pulses: Normal pulses.      Heart sounds: Normal heart sounds. No murmur heard.     No friction rub.   Pulmonary:      Effort: Pulmonary effort is normal. No respiratory distress.      Breath sounds: Wheezing present.   Abdominal:      General: Abdomen is flat. Bowel sounds are normal. There is no distension.      Palpations: Abdomen is soft. There is no mass.      Tenderness: There is no abdominal tenderness. There is no guarding.   Musculoskeletal:         General: Normal range of motion.      Cervical back: Neck supple.   Neurological:      General: No focal deficit present.      Mental Status: She is alert and oriented to person, place, and time. Mental status is at baseline.      Cranial Nerves: No cranial nerve deficit.   Psychiatric:         Mood and Affect: Mood normal.         Behavior: Behavior normal.

## 2024-10-09 ENCOUNTER — HOSPITAL ENCOUNTER (EMERGENCY)
Facility: HOSPITAL | Age: 84
Discharge: HOME | End: 2024-10-09
Attending: EMERGENCY MEDICINE
Payer: COMMERCIAL

## 2024-10-09 ENCOUNTER — TELEPHONE (OUTPATIENT)
Age: 84
End: 2024-10-09

## 2024-10-09 ENCOUNTER — APPOINTMENT (EMERGENCY)
Dept: RADIOLOGY | Facility: HOSPITAL | Age: 84
End: 2024-10-09
Payer: COMMERCIAL

## 2024-10-09 VITALS
HEART RATE: 62 BPM | SYSTOLIC BLOOD PRESSURE: 159 MMHG | OXYGEN SATURATION: 96 % | TEMPERATURE: 97.6 F | RESPIRATION RATE: 18 BRPM | DIASTOLIC BLOOD PRESSURE: 71 MMHG

## 2024-10-09 DIAGNOSIS — U07.1 COVID: ICD-10-CM

## 2024-10-09 DIAGNOSIS — I10 PRIMARY HYPERTENSION: Primary | ICD-10-CM

## 2024-10-09 DIAGNOSIS — J18.9 PNEUMONIA OF RIGHT MIDDLE LOBE DUE TO INFECTIOUS ORGANISM: ICD-10-CM

## 2024-10-09 LAB
2HR DELTA HS TROPONIN: -1 NG/L
ANION GAP SERPL CALCULATED.3IONS-SCNC: 7 MMOL/L (ref 4–13)
BASOPHILS # BLD AUTO: 0.03 THOUSANDS/ΜL (ref 0–0.1)
BASOPHILS NFR BLD AUTO: 1 % (ref 0–1)
BUN SERPL-MCNC: 8 MG/DL (ref 5–25)
CALCIUM SERPL-MCNC: 8.6 MG/DL (ref 8.4–10.2)
CARDIAC TROPONIN I PNL SERPL HS: 10 NG/L
CARDIAC TROPONIN I PNL SERPL HS: 9 NG/L
CHLORIDE SERPL-SCNC: 104 MMOL/L (ref 96–108)
CO2 SERPL-SCNC: 23 MMOL/L (ref 21–32)
CREAT SERPL-MCNC: 0.71 MG/DL (ref 0.6–1.3)
EOSINOPHIL # BLD AUTO: 0.12 THOUSAND/ΜL (ref 0–0.61)
EOSINOPHIL NFR BLD AUTO: 3 % (ref 0–6)
ERYTHROCYTE [DISTWIDTH] IN BLOOD BY AUTOMATED COUNT: 12.3 % (ref 11.6–15.1)
GFR SERPL CREATININE-BSD FRML MDRD: 77 ML/MIN/1.73SQ M
GLUCOSE SERPL-MCNC: 132 MG/DL (ref 65–140)
HCT VFR BLD AUTO: 38.2 % (ref 34.8–46.1)
HGB BLD-MCNC: 12.4 G/DL (ref 11.5–15.4)
IMM GRANULOCYTES # BLD AUTO: 0.03 THOUSAND/UL (ref 0–0.2)
IMM GRANULOCYTES NFR BLD AUTO: 1 % (ref 0–2)
LYMPHOCYTES # BLD AUTO: 2.14 THOUSANDS/ΜL (ref 0.6–4.47)
LYMPHOCYTES NFR BLD AUTO: 46 % (ref 14–44)
MCH RBC QN AUTO: 32.4 PG (ref 26.8–34.3)
MCHC RBC AUTO-ENTMCNC: 32.5 G/DL (ref 31.4–37.4)
MCV RBC AUTO: 100 FL (ref 82–98)
MONOCYTES # BLD AUTO: 0.36 THOUSAND/ΜL (ref 0.17–1.22)
MONOCYTES NFR BLD AUTO: 8 % (ref 4–12)
NEUTROPHILS # BLD AUTO: 1.86 THOUSANDS/ΜL (ref 1.85–7.62)
NEUTS SEG NFR BLD AUTO: 41 % (ref 43–75)
NRBC BLD AUTO-RTO: 0 /100 WBCS
PLATELET # BLD AUTO: 165 THOUSANDS/UL (ref 149–390)
PMV BLD AUTO: 10.7 FL (ref 8.9–12.7)
POTASSIUM SERPL-SCNC: 4.1 MMOL/L (ref 3.5–5.3)
RBC # BLD AUTO: 3.83 MILLION/UL (ref 3.81–5.12)
SODIUM SERPL-SCNC: 134 MMOL/L (ref 135–147)
WBC # BLD AUTO: 4.54 THOUSAND/UL (ref 4.31–10.16)

## 2024-10-09 PROCEDURE — 99284 EMERGENCY DEPT VISIT MOD MDM: CPT | Performed by: EMERGENCY MEDICINE

## 2024-10-09 PROCEDURE — 80048 BASIC METABOLIC PNL TOTAL CA: CPT | Performed by: EMERGENCY MEDICINE

## 2024-10-09 PROCEDURE — 71046 X-RAY EXAM CHEST 2 VIEWS: CPT

## 2024-10-09 PROCEDURE — 85025 COMPLETE CBC W/AUTO DIFF WBC: CPT | Performed by: EMERGENCY MEDICINE

## 2024-10-09 PROCEDURE — 93005 ELECTROCARDIOGRAM TRACING: CPT

## 2024-10-09 PROCEDURE — 99284 EMERGENCY DEPT VISIT MOD MDM: CPT

## 2024-10-09 PROCEDURE — 96365 THER/PROPH/DIAG IV INF INIT: CPT

## 2024-10-09 PROCEDURE — 94640 AIRWAY INHALATION TREATMENT: CPT

## 2024-10-09 PROCEDURE — 36415 COLL VENOUS BLD VENIPUNCTURE: CPT | Performed by: EMERGENCY MEDICINE

## 2024-10-09 PROCEDURE — 84484 ASSAY OF TROPONIN QUANT: CPT | Performed by: EMERGENCY MEDICINE

## 2024-10-09 RX ORDER — ALBUTEROL SULFATE 90 UG/1
2 INHALANT RESPIRATORY (INHALATION) ONCE
Status: COMPLETED | OUTPATIENT
Start: 2024-10-09 | End: 2024-10-09

## 2024-10-09 RX ORDER — IPRATROPIUM BROMIDE AND ALBUTEROL SULFATE 2.5; .5 MG/3ML; MG/3ML
3 SOLUTION RESPIRATORY (INHALATION) ONCE
Status: COMPLETED | OUTPATIENT
Start: 2024-10-09 | End: 2024-10-09

## 2024-10-09 RX ORDER — ALBUTEROL SULFATE 90 UG/1
1-2 INHALANT RESPIRATORY (INHALATION) EVERY 6 HOURS PRN
Qty: 1 G | Refills: 0 | Status: SHIPPED | OUTPATIENT
Start: 2024-10-09

## 2024-10-09 RX ORDER — AZITHROMYCIN 250 MG/1
TABLET, FILM COATED ORAL
Qty: 4 TABLET | Refills: 0 | Status: SHIPPED | OUTPATIENT
Start: 2024-10-09 | End: 2024-10-13

## 2024-10-09 RX ADMIN — ALBUTEROL SULFATE 2 PUFF: 90 AEROSOL, METERED RESPIRATORY (INHALATION) at 22:32

## 2024-10-09 RX ADMIN — AZITHROMYCIN MONOHYDRATE 500 MG: 500 INJECTION, POWDER, LYOPHILIZED, FOR SOLUTION INTRAVENOUS at 22:32

## 2024-10-09 RX ADMIN — IPRATROPIUM BROMIDE AND ALBUTEROL SULFATE 3 ML: .5; 3 SOLUTION RESPIRATORY (INHALATION) at 21:08

## 2024-10-09 NOTE — TELEPHONE ENCOUNTER
Called pt and she stated that she is in the ER right now, she wasn't feeling well and had shortness of breath so she decided to go there to be seen

## 2024-10-09 NOTE — TELEPHONE ENCOUNTER
Patient call because she taking paxlovid is  making the patient blood pressure high. Patient would like to know if she should taking another blood pressure medication because her blood pressure is very high after taking the Paxlovid. Thank you

## 2024-10-10 LAB
ATRIAL RATE: 52 BPM
P AXIS: 53 DEGREES
PR INTERVAL: 132 MS
QRS AXIS: 21 DEGREES
QRSD INTERVAL: 142 MS
QT INTERVAL: 494 MS
QTC INTERVAL: 459 MS
T WAVE AXIS: 226 DEGREES
VENTRICULAR RATE: 52 BPM

## 2024-10-10 PROCEDURE — 93010 ELECTROCARDIOGRAM REPORT: CPT | Performed by: INTERNAL MEDICINE

## 2024-10-10 NOTE — ED PROVIDER NOTES
Final diagnoses:   Primary hypertension   COVID   Pneumonia of right middle lobe due to infectious organism     ED Disposition       ED Disposition   Discharge    Condition   Stable    Date/Time   Wed Oct 9, 2024 10:22 PM    Comment   Ralf Ayala discharge to home/self care.                   Assessment & Plan       Medical Decision Making  Amount and/or Complexity of Data Reviewed  Labs: ordered.  Radiology: ordered.    Risk  Prescription drug management.        ED Course as of 10/09/24 2330   Wed Oct 09, 2024   5303 Patient informed of chest x-ray findings, informed she needed a second troponin.  Patient states she is feeling better.  Will prescribe albuterol, Zithromax course, follow-up with her primary care doctor       Medications   azithromycin (ZITHROMAX) 500 mg in sodium chloride 0.9% 250mL IVPB 500 mg (500 mg Intravenous New Bag 10/9/24 2232)   ipratropium-albuterol (DUO-NEB) 0.5-2.5 mg/3 mL inhalation solution 3 mL (3 mL Nebulization Given 10/9/24 2108)   albuterol (PROVENTIL HFA,VENTOLIN HFA) inhaler 2 puff (2 puffs Inhalation Given 10/9/24 2232)       ED Risk Strat Scores                           SBIRT 20yo+      Flowsheet Row Most Recent Value   Initial Alcohol Screen: US AUDIT-C     1. How often do you have a drink containing alcohol? 0 Filed at: 10/09/2024 1924   2. How many drinks containing alcohol do you have on a typical day you are drinking?  0 Filed at: 10/09/2024 1924   3b. FEMALE Any Age, or MALE 65+: How often do you have 4 or more drinks on one occassion? 0 Filed at: 10/09/2024 1924   Audit-C Score 0 Filed at: 10/09/2024 1924   SERGE: How many times in the past year have you...    Used an illegal drug or used a prescription medication for non-medical reasons? Never Filed at: 10/09/2024 1924                            History of Present Illness       Chief Complaint   Patient presents with    Hypertension     Pt reports high BP reading at home SBP was >180. Pt has no complaints and is  "asymptomatic. Reports her at home BP meds \"are not working.\"       Past Medical History:   Diagnosis Date    Aortic insufficiency     Cee's esophagus     Gastroesophageal reflux disease     Glaucoma     Hypercholesterolemia     Hyperlipidemia     Hypertension     Kidney stones 2009    Low back pain     Major depressive disorder     Mitral valve insufficiency     Osteoporosis     Osteoporosis     Vitamin D deficiency       Past Surgical History:   Procedure Laterality Date    COLONOSCOPY  06/02/2011    Normal     CYSTOSCOPY  2009    Polyp, stones    MAMMO (HISTORICAL)  09/19/2016    Negative     OTHER SURGICAL HISTORY      Infection in arm       Family History   Problem Relation Age of Onset    Cancer Mother 90        unknown type of abdominal cancer    No Known Problems Father     No Known Problems Sister     No Known Problems Daughter     No Known Problems Daughter     No Known Problems Maternal Grandmother     No Known Problems Maternal Grandfather     No Known Problems Paternal Grandmother     No Known Problems Paternal Grandfather     Lung cancer Brother 70    Lung cancer Brother 65    No Known Problems Son     Breast cancer Neg Hx       Social History     Tobacco Use    Smoking status: Never    Smokeless tobacco: Never   Vaping Use    Vaping status: Never Used   Substance Use Topics    Alcohol use: Not Currently    Drug use: No      E-Cigarette/Vaping    E-Cigarette Use Never User       E-Cigarette/Vaping Substances    Nicotine No     THC No     CBD No     Flavoring No     Other No       I have reviewed and agree with the history as documented.     HPI this is an 84-year-old woman presenting to the emergency department with known COVID, here with hypertension.  She was diagnosed with COVID 3 days ago.  The patient states that she has been having a cough, and when she coughs she has back pain.  She states that she has tightness when she coughs.  She has not had fevers.  She did have some chills.  She does " have some body aches.  Her doctor started her on Paxlovid, which she has been taking.  The patient states that her blood pressure is high, and that is why she is here today.  States that she is still coughing but overall feeling much better.  Her cough is not productive.  Patient's history is somewhat limited as patient is primarily Hungarian speaking and refuses to use a  here.    Review of Systems    Reviewed    Objective       ED Triage Vitals [10/09/24 1922]   Temperature Pulse Blood Pressure Respirations SpO2 Patient Position - Orthostatic VS   97.6 °F (36.4 °C) 64 (!) 224/91 20 97 % Sitting      Temp Source Heart Rate Source BP Location FiO2 (%) Pain Score    Tympanic Monitor Left arm -- --      Vitals      Date and Time Temp Pulse SpO2 Resp BP Pain Score FACES Pain Rating User   10/09/24 2300 -- 62 96 % 18 159/71 -- -- OO   10/09/24 2230 -- 57 96 % 16 169/78 -- -- OO   10/09/24 2200 -- 57 96 % 16 174/77 -- -- OO   10/09/24 2130 -- 56 98 % 20 169/92 -- -- OO   10/09/24 2103 -- -- -- -- 182/80 -- -- OO   10/09/24 2100 -- 58 97 % 18 210/86 -- -- OO   10/09/24 1922 97.6 °F (36.4 °C) 64 97 % 20 224/91 -- -- JS            Physical Exam  On exam the patient is awake, alert, and ambulatory.  She is anicteric.  Her mucous membranes are moist.  Her face is symmetric.  Her neck is supple.  Her heart is regular without murmurs, rubs, or gallops.  She has wheezing in the right lung, but good air movement throughout.  She has normal work of breathing.  She has adequate oxygen saturation.  Her abdomen is soft and nontender with no masses, rebound, guarding.  Extremities are intact.  She has no lateralizing edema.  She is neurologically nonfocal with normal skin and back exam.  Results Reviewed       Procedure Component Value Units Date/Time    HS Troponin I 2hr [[id number]]  (Normal) Collected: 10/09/24 2257    Lab Status: Final result Specimen: Blood from Arm, Left Updated: 10/09/24 2330     hs TnI 2hr 9 ng/L       Delta 2hr hsTnI -1 ng/L     HS Troponin I 4hr [[id number]]     Lab Status: No result Specimen: Blood     HS Troponin 0hr (reflex protocol) [[id number]]  (Normal) Collected: 10/09/24 2103    Lab Status: Final result Specimen: Blood from Arm, Left Updated: 10/09/24 2136     hs TnI 0hr 10 ng/L     Basic metabolic panel [[id number]]  (Abnormal) Collected: 10/09/24 2103    Lab Status: Final result Specimen: Blood from Arm, Left Updated: 10/09/24 2134     Sodium 134 mmol/L      Potassium 4.1 mmol/L      Chloride 104 mmol/L      CO2 23 mmol/L      ANION GAP 7 mmol/L      BUN 8 mg/dL      Creatinine 0.71 mg/dL      Glucose 132 mg/dL      Calcium 8.6 mg/dL      eGFR 77 ml/min/1.73sq m     Narrative:      National Kidney Disease Foundation guidelines for Chronic Kidney Disease (CKD):     Stage 1 with normal or high GFR (GFR > 90 mL/min/1.73 square meters)    Stage 2 Mild CKD (GFR = 60-89 mL/min/1.73 square meters)    Stage 3A Moderate CKD (GFR = 45-59 mL/min/1.73 square meters)    Stage 3B Moderate CKD (GFR = 30-44 mL/min/1.73 square meters)    Stage 4 Severe CKD (GFR = 15-29 mL/min/1.73 square meters)    Stage 5 End Stage CKD (GFR <15 mL/min/1.73 square meters)  Note: GFR calculation is accurate only with a steady state creatinine    CBC and differential [[id number]]  (Abnormal) Collected: 10/09/24 2103    Lab Status: Final result Specimen: Blood from Arm, Left Updated: 10/09/24 2116     WBC 4.54 Thousand/uL      RBC 3.83 Million/uL      Hemoglobin 12.4 g/dL      Hematocrit 38.2 %       fL      MCH 32.4 pg      MCHC 32.5 g/dL      RDW 12.3 %      MPV 10.7 fL      Platelets 165 Thousands/uL      nRBC 0 /100 WBCs      Segmented % 41 %      Immature Grans % 1 %      Lymphocytes % 46 %      Monocytes % 8 %      Eosinophils Relative 3 %      Basophils Relative 1 %      Absolute Neutrophils 1.86 Thousands/µL      Absolute Immature Grans 0.03 Thousand/uL      Absolute Lymphocytes 2.14 Thousands/µL      Absolute Monocytes 0.36  Thousand/µL      Eosinophils Absolute 0.12 Thousand/µL      Basophils Absolute 0.03 Thousands/µL             XR chest 2 views    (Results Pending)       Procedures    ED Medication and Procedure Management   Prior to Admission Medications   Prescriptions Last Dose Informant Patient Reported? Taking?   Ascorbic Acid (vitamin C) 1000 MG tablet  Self Yes No   Sig: Take 1,000 mg by mouth daily   Dextromethorphan Polistirex ER (Delsym) 30 MG/5ML SUER  Self No No   Sig: Take 5 mL (30 mg total) by mouth 2 (two) times a day as needed (cough)   Lumigan 0.01 % ophthalmic drops  Self Yes No   Multiple Vitamin tablet  Self Yes No   Sig: Take by mouth   Travatan Z 0.004 % ophthalmic solution  Self Yes No   acetaminophen (TYLENOL) 325 mg tablet  Self Yes No   Sig: As needed body aches   amLODIPine (NORVASC) 2.5 mg tablet  Self No No   Sig: Take 1 tablet (2.5 mg total) by mouth daily   aspirin (ECOTRIN LOW STRENGTH) 81 mg EC tablet  Self Yes No   Sig: Take 81 mg by mouth daily Sometimes   Patient not taking: Reported on 3/21/2023   cetirizine (ZyrTEC) 10 mg tablet  Self No No   Sig: Take 1 tablet (10 mg total) by mouth daily   cholecalciferol (VITAMIN D3) 25 mcg (1,000 units) tablet  Self Yes No   Sig: Take 1,000 Units by mouth daily   citalopram (CeleXA) 20 mg tablet  Self No No   Sig: TAKE 1/2 OR 1 TABLET DAILY AS DIRECTED BY DOCTOR   dorzolamide (TRUSOPT) 2 % ophthalmic solution  Self Yes No   famotidine (PEPCID) 20 mg tablet  Self No No   Sig: Take 1 tablet (20 mg total) by mouth 2 (two) times a day   fluticasone (FLONASE) 50 mcg/act nasal spray  Self No No   Si spray into each nostril daily   montelukast (SINGULAIR) 10 mg tablet  Self No No   Sig: Take 1 tablet (10 mg total) by mouth daily at bedtime   nirmatrelvir & ritonavir (Paxlovid, 300/100,) tablet therapy pack   No No   Sig: Take 3 tablets by mouth 2 (two) times a day for 5 days   olmesartan (BENICAR) 20 mg tablet  Self No No   Sig: Take 1 tablet (20 mg total) by  mouth 2 (two) times a day   rosuvastatin (CRESTOR) 5 mg tablet  Self No No   Sig: TAKE 1 TABLET (5 MG TOTAL) BY MOUTH DAILY AT BEDTIME   sodium chloride (OCEAN) 0.65 % nasal spray  Self No No   Si spray into each nostril every 2 (two) hours while awake   Patient not taking: Reported on 3/21/2023      Facility-Administered Medications: None     Patient's Medications   Discharge Prescriptions    ALBUTEROL (VENTOLIN HFA) 90 MCG/ACT INHALER    Inhale 1-2 puffs every 6 (six) hours as needed for wheezing       Start Date: 10/9/2024 End Date: --       Order Dose: 1-2 puffs       Quantity: 1 g    Refills: 0    AZITHROMYCIN (ZITHROMAX Z-SUREKHA) 250 MG TABLET    Take 1 tablet daily for 4 days       Start Date: 10/9/2024 End Date: 10/13/2024       Order Dose: --       Quantity: 4 tablet    Refills: 0     No discharge procedures on file.  ED SEPSIS DOCUMENTATION   Time reflects when diagnosis was documented in both MDM as applicable and the Disposition within this note       Time User Action Codes Description Comment    10/9/2024 10:22 PM Lucy Desai [I10] Primary hypertension     10/9/2024 10:22 PM Lucy Desai [U07.1] COVID     10/9/2024 10:23 PM Lucy Desai [J18.9] Pneumonia of right middle lobe due to infectious organism           MEDICAL DECISION MAKING    Number and Complexity of Problems  Differential diagnosis: COVID, bronchospasm, possible bacterial pneumonia, doubt cardiac given viral symptoms    Medical Decision Making Data  External documents reviewed: Patient's visit from yesterday reviewed  My EKG interpretation: Sinus, flipped T's which have been present previously  My CT interpretation:   My X-ray interpretation: Reviewed by me, right middle lobe pneumonia  My ultrasound interpretation:     XR chest 2 views    (Results Pending)       Labs Reviewed   CBC AND DIFFERENTIAL - Abnormal       Result Value Ref Range Status    WBC 4.54  4.31 - 10.16 Thousand/uL Final    RBC 3.83   3.81 - 5.12 Million/uL Final    Hemoglobin 12.4  11.5 - 15.4 g/dL Final    Hematocrit 38.2  34.8 - 46.1 % Final     (*) 82 - 98 fL Final    MCH 32.4  26.8 - 34.3 pg Final    MCHC 32.5  31.4 - 37.4 g/dL Final    RDW 12.3  11.6 - 15.1 % Final    MPV 10.7  8.9 - 12.7 fL Final    Platelets 165  149 - 390 Thousands/uL Final    nRBC 0  /100 WBCs Final    Segmented % 41 (*) 43 - 75 % Final    Immature Grans % 1  0 - 2 % Final    Lymphocytes % 46 (*) 14 - 44 % Final    Monocytes % 8  4 - 12 % Final    Eosinophils Relative 3  0 - 6 % Final    Basophils Relative 1  0 - 1 % Final    Absolute Neutrophils 1.86  1.85 - 7.62 Thousands/µL Final    Absolute Immature Grans 0.03  0.00 - 0.20 Thousand/uL Final    Absolute Lymphocytes 2.14  0.60 - 4.47 Thousands/µL Final    Absolute Monocytes 0.36  0.17 - 1.22 Thousand/µL Final    Eosinophils Absolute 0.12  0.00 - 0.61 Thousand/µL Final    Basophils Absolute 0.03  0.00 - 0.10 Thousands/µL Final   BASIC METABOLIC PANEL - Abnormal    Sodium 134 (*) 135 - 147 mmol/L Final    Potassium 4.1  3.5 - 5.3 mmol/L Final    Chloride 104  96 - 108 mmol/L Final    CO2 23  21 - 32 mmol/L Final    ANION GAP 7  4 - 13 mmol/L Final    BUN 8  5 - 25 mg/dL Final    Creatinine 0.71  0.60 - 1.30 mg/dL Final    Comment: Standardized to IDMS reference method    Glucose 132  65 - 140 mg/dL Final    Comment: If the patient is fasting, the ADA then defines impaired fasting glucose as > 100 mg/dL and diabetes as > or equal to 123 mg/dL.    Calcium 8.6  8.4 - 10.2 mg/dL Final    eGFR 77  ml/min/1.73sq m Final    Narrative:     National Kidney Disease Foundation guidelines for Chronic Kidney Disease (CKD):     Stage 1 with normal or high GFR (GFR > 90 mL/min/1.73 square meters)    Stage 2 Mild CKD (GFR = 60-89 mL/min/1.73 square meters)    Stage 3A Moderate CKD (GFR = 45-59 mL/min/1.73 square meters)    Stage 3B Moderate CKD (GFR = 30-44 mL/min/1.73 square meters)    Stage 4 Severe CKD (GFR = 15-29  "mL/min/1.73 square meters)    Stage 5 End Stage CKD (GFR <15 mL/min/1.73 square meters)  Note: GFR calculation is accurate only with a steady state creatinine   HS TROPONIN I 0HR - Normal    hs TnI 0hr 10  \"Refer to ACS Flowchart\"- see link ng/L Final    Comment:                                              Initial (time 0) result  If >=50 ng/L, Myocardial injury suggested ;  Type of myocardial injury and treatment strategy  to be determined.  If 5-49 ng/L, a delta result at 2 hours and or 4 hours will be needed to further evaluate.  If <4 ng/L, and chest pain has been >3 hours since onset, patient may qualify for discharge based on the HEART score in the ED.  If <5 ng/L and <3hours since onset of chest pain, a delta result at 2 hours will be needed to further evaluate.    HS Troponin 99th Percentile URL of a Health Population=12 ng/L with a 95% Confidence Interval of 8-18 ng/L.    Second Troponin (time 2 hours)  If calculated delta >= 20 ng/L,  Myocardial injury suggested ; Type of myocardial injury and treatment strategy to be determined.  If 5-49 ng/L and the calculated delta is 5-19 ng/L, consult medical service for evaluation.  Continue evaluation for ischemia on ecg and other possible etiology and repeat hs troponin at 4 hours.  If delta is <5 ng/L at 2 hours, consider discharge based on risk stratification via the HEART score (if in ED), or JOSE RAFAEL risk score in IP/Observation.    HS Troponin 99th Percentile URL of a Health Population=12 ng/L with a 95% Confidence Interval of 8-18 ng/L.   HS TROPONIN I 2HR - Normal    hs TnI 2hr 9  \"Refer to ACS Flowchart\"- see link ng/L Final    Comment:                                              Initial (time 0) result  If >=50 ng/L, Myocardial injury suggested ;  Type of myocardial injury and treatment strategy  to be determined.  If 5-49 ng/L, a delta result at 2 hours and or 4 hours will be needed to further evaluate.  If <4 ng/L, and chest pain has been >3 hours since " onset, patient may qualify for discharge based on the HEART score in the ED.  If <5 ng/L and <3hours since onset of chest pain, a delta result at 2 hours will be needed to further evaluate.    HS Troponin 99th Percentile URL of a Health Population=12 ng/L with a 95% Confidence Interval of 8-18 ng/L.    Second Troponin (time 2 hours)  If calculated delta >= 20 ng/L,  Myocardial injury suggested ; Type of myocardial injury and treatment strategy to be determined.  If 5-49 ng/L and the calculated delta is 5-19 ng/L, consult medical service for evaluation.  Continue evaluation for ischemia on ecg and other possible etiology and repeat hs troponin at 4 hours.  If delta is <5 ng/L at 2 hours, consider discharge based on risk stratification via the HEART score (if in ED), or JOSE RAFAEL risk score in IP/Observation.    HS Troponin 99th Percentile URL of a Health Population=12 ng/L with a 95% Confidence Interval of 8-18 ng/L.    Delta 2hr hsTnI -1  <20 ng/L Final   HS TROPONIN I 4HR       Labs reviewed by me are significant for: Patient with troponin of 10, no chest pain    Clinical decision rules/scores are significant for:     Discussed case with:   Considered admission for:     Treatment and Disposition  ED course: Patient seen and examined.  Given bronchodilators.  Patient felt much better after albuterol.  Will prescribe MDI.  Chest x-ray reviewed, patient with right middle lobe pneumonia.  Will plan to treat outpatient with Zithromax.  Negative delta troponin  Shared decision making:   Code status:          Lucy Desai MD  10/09/24 2234       Lucy Desai MD  10/09/24 3857

## 2024-10-10 NOTE — DISCHARGE INSTRUCTIONS
You should take your antibiotics as prescribed.  You should return to the emergency department for any worsening shortness of breath, chest pain, or any other concerns.  You should call your doctor in follow-up regarding your blood pressure.  Your blood pressure should be managed by your primary care doctor, and is not the reason for your pneumonia or your cough.  Your chest x-ray shows a right middle lobe pneumonia, which is why you were placed on Zithromax.  You can continue your Paxlovid.

## 2024-10-22 ENCOUNTER — OFFICE VISIT (OUTPATIENT)
Dept: INTERNAL MEDICINE CLINIC | Facility: CLINIC | Age: 84
End: 2024-10-22
Payer: COMMERCIAL

## 2024-10-22 VITALS
OXYGEN SATURATION: 96 % | TEMPERATURE: 96.5 F | BODY MASS INDEX: 25.86 KG/M2 | DIASTOLIC BLOOD PRESSURE: 68 MMHG | HEART RATE: 71 BPM | HEIGHT: 61 IN | WEIGHT: 137 LBS | SYSTOLIC BLOOD PRESSURE: 134 MMHG

## 2024-10-22 DIAGNOSIS — I10 ESSENTIAL HYPERTENSION: Primary | ICD-10-CM

## 2024-10-22 DIAGNOSIS — J00 ACUTE NASOPHARYNGITIS: ICD-10-CM

## 2024-10-22 DIAGNOSIS — J98.11 ATELECTASIS: ICD-10-CM

## 2024-10-22 DIAGNOSIS — J30.89 NON-SEASONAL ALLERGIC RHINITIS, UNSPECIFIED TRIGGER: ICD-10-CM

## 2024-10-22 DIAGNOSIS — M81.0 AGE-RELATED OSTEOPOROSIS WITHOUT CURRENT PATHOLOGICAL FRACTURE: ICD-10-CM

## 2024-10-22 DIAGNOSIS — Z23 ENCOUNTER FOR IMMUNIZATION: ICD-10-CM

## 2024-10-22 LAB
SARS-COV-2 AG UPPER RESP QL IA: NEGATIVE
VALID CONTROL: NORMAL

## 2024-10-22 PROCEDURE — G0008 ADMIN INFLUENZA VIRUS VAC: HCPCS

## 2024-10-22 PROCEDURE — 99214 OFFICE O/P EST MOD 30 MIN: CPT | Performed by: INTERNAL MEDICINE

## 2024-10-22 PROCEDURE — 87811 SARS-COV-2 COVID19 W/OPTIC: CPT

## 2024-10-22 PROCEDURE — 90662 IIV NO PRSV INCREASED AG IM: CPT

## 2024-10-22 RX ORDER — OLMESARTAN MEDOXOMIL 20 MG/1
20 TABLET ORAL 2 TIMES DAILY
Qty: 180 TABLET | Refills: 0 | Status: SHIPPED | OUTPATIENT
Start: 2024-10-22

## 2024-10-22 RX ORDER — AMLODIPINE BESYLATE 2.5 MG/1
2.5 TABLET ORAL DAILY
Qty: 90 TABLET | Refills: 0 | Status: SHIPPED | OUTPATIENT
Start: 2024-10-22

## 2024-10-22 NOTE — PROGRESS NOTES
Assessment/Plan:           1. Essential hypertension  Comments:  Readings are stable continue amlodipine and olmesartan.  Orders:  -     amLODIPine (NORVASC) 2.5 mg tablet; Take 1 tablet (2.5 mg total) by mouth daily  -     olmesartan (BENICAR) 20 mg tablet; Take 1 tablet (20 mg total) by mouth 2 (two) times a day  2. Age-related osteoporosis without current pathological fracture  Comments:  Patient was advised to get her DEXA scan completed and follow-up with rheumatology.  3. Non-seasonal allergic rhinitis, unspecified trigger  4. Atelectasis  Comments:  Breathing exercises advised.  Orders:  -     XR chest pa and lateral; Future; Expected date: 01/06/2025  5. Acute nasopharyngitis  -     POCT Rapid Covid Ag  6. Encounter for immunization  -     influenza vaccine, high-dose, PF 0.5 mL (Fluzone High Dose)         1. Essential hypertension      2. Age-related osteoporosis without current pathological fracture      3. Non-seasonal allergic rhinitis, unspecified trigger         No problem-specific Assessment & Plan notes found for this encounter.           Subjective:      Patient ID: Ralf Ayala is a 84 y.o. female.    HPI    The following portions of the patient's history were reviewed and updated as appropriate: She  has a past medical history of Aortic insufficiency, Cee's esophagus, Gastroesophageal reflux disease, Glaucoma, Hypercholesterolemia, Hyperlipidemia, Hypertension, Kidney stones (2009), Low back pain, Major depressive disorder, Mitral valve insufficiency, Osteoporosis, Osteoporosis, and Vitamin D deficiency.  She   Patient Active Problem List    Diagnosis Date Noted    Age-related osteoporosis without current pathological fracture 03/21/2023    Fall 06/16/2022    Pancreatic cyst 03/19/2021    Vitamin D deficiency 03/19/2021    Dysthymia 03/19/2021    Gastroesophageal reflux disease without esophagitis 03/19/2021    Essential hypertension 01/19/2018    Hyperlipemia 01/19/2018    Anxiety  01/19/2018    Bradycardia 01/19/2018    Dizziness 01/19/2018     She  has a past surgical history that includes Cystoscopy (2009); Other surgical history; Colonoscopy (06/02/2011); and Mammo (historical) (09/19/2016).  Her family history includes Cancer (age of onset: 90) in her mother; Lung cancer (age of onset: 65) in her brother; Lung cancer (age of onset: 70) in her brother; No Known Problems in her daughter, daughter, father, maternal grandfather, maternal grandmother, paternal grandfather, paternal grandmother, sister, and son.  She  reports that she has never smoked. She has never used smokeless tobacco. She reports that she does not currently use alcohol. She reports that she does not use drugs.  Current Outpatient Medications   Medication Sig Dispense Refill    acetaminophen (TYLENOL) 325 mg tablet As needed body aches      albuterol (Ventolin HFA) 90 mcg/act inhaler Inhale 1-2 puffs every 6 (six) hours as needed for wheezing 1 g 0    amLODIPine (NORVASC) 2.5 mg tablet Take 1 tablet (2.5 mg total) by mouth daily 90 tablet 0    Ascorbic Acid (vitamin C) 1000 MG tablet Take 1,000 mg by mouth daily      cetirizine (ZyrTEC) 10 mg tablet Take 1 tablet (10 mg total) by mouth daily 30 tablet 2    cholecalciferol (VITAMIN D3) 25 mcg (1,000 units) tablet Take 1,000 Units by mouth daily      citalopram (CeleXA) 20 mg tablet TAKE 1/2 OR 1 TABLET DAILY AS DIRECTED BY DOCTOR 90 tablet 3    Dextromethorphan Polistirex ER (Delsym) 30 MG/5ML SUER Take 5 mL (30 mg total) by mouth 2 (two) times a day as needed (cough) 120 mL 0    dorzolamide (TRUSOPT) 2 % ophthalmic solution       famotidine (PEPCID) 20 mg tablet Take 1 tablet (20 mg total) by mouth 2 (two) times a day 180 tablet 1    fluticasone (FLONASE) 50 mcg/act nasal spray 1 spray into each nostril daily 10 mL 2    Lumigan 0.01 % ophthalmic drops       montelukast (SINGULAIR) 10 mg tablet Take 1 tablet (10 mg total) by mouth daily at bedtime 30 tablet 2    Multiple  Vitamin tablet Take by mouth      olmesartan (BENICAR) 20 mg tablet Take 1 tablet (20 mg total) by mouth 2 (two) times a day 180 tablet 0    rosuvastatin (CRESTOR) 5 mg tablet TAKE 1 TABLET (5 MG TOTAL) BY MOUTH DAILY AT BEDTIME 90 tablet 1    Travatan Z 0.004 % ophthalmic solution       aspirin (ECOTRIN LOW STRENGTH) 81 mg EC tablet Take 81 mg by mouth daily Sometimes (Patient not taking: Reported on 3/21/2023)      sodium chloride (OCEAN) 0.65 % nasal spray 1 spray into each nostril every 2 (two) hours while awake (Patient not taking: Reported on 3/21/2023) 60 mL 1     No current facility-administered medications for this visit.     Current Outpatient Medications on File Prior to Visit   Medication Sig    acetaminophen (TYLENOL) 325 mg tablet As needed body aches    albuterol (Ventolin HFA) 90 mcg/act inhaler Inhale 1-2 puffs every 6 (six) hours as needed for wheezing    Ascorbic Acid (vitamin C) 1000 MG tablet Take 1,000 mg by mouth daily    cetirizine (ZyrTEC) 10 mg tablet Take 1 tablet (10 mg total) by mouth daily    cholecalciferol (VITAMIN D3) 25 mcg (1,000 units) tablet Take 1,000 Units by mouth daily    citalopram (CeleXA) 20 mg tablet TAKE 1/2 OR 1 TABLET DAILY AS DIRECTED BY DOCTOR    Dextromethorphan Polistirex ER (Delsym) 30 MG/5ML SUER Take 5 mL (30 mg total) by mouth 2 (two) times a day as needed (cough)    dorzolamide (TRUSOPT) 2 % ophthalmic solution     famotidine (PEPCID) 20 mg tablet Take 1 tablet (20 mg total) by mouth 2 (two) times a day    fluticasone (FLONASE) 50 mcg/act nasal spray 1 spray into each nostril daily    Lumigan 0.01 % ophthalmic drops     montelukast (SINGULAIR) 10 mg tablet Take 1 tablet (10 mg total) by mouth daily at bedtime    Multiple Vitamin tablet Take by mouth    rosuvastatin (CRESTOR) 5 mg tablet TAKE 1 TABLET (5 MG TOTAL) BY MOUTH DAILY AT BEDTIME    Travatan Z 0.004 % ophthalmic solution     [DISCONTINUED] amLODIPine (NORVASC) 2.5 mg tablet Take 1 tablet (2.5 mg  "total) by mouth daily    [DISCONTINUED] olmesartan (BENICAR) 20 mg tablet Take 1 tablet (20 mg total) by mouth 2 (two) times a day    aspirin (ECOTRIN LOW STRENGTH) 81 mg EC tablet Take 81 mg by mouth daily Sometimes (Patient not taking: Reported on 3/21/2023)    sodium chloride (OCEAN) 0.65 % nasal spray 1 spray into each nostril every 2 (two) hours while awake (Patient not taking: Reported on 3/21/2023)     No current facility-administered medications on file prior to visit.     Medications Discontinued During This Encounter   Medication Reason    amLODIPine (NORVASC) 2.5 mg tablet Reorder    olmesartan (BENICAR) 20 mg tablet Reorder      She has No Known Allergies..    Review of Systems   Constitutional:  Negative for appetite change, chills, fatigue and fever.   HENT:  Negative for sore throat and trouble swallowing.    Eyes:  Negative for redness.   Respiratory:  Negative for shortness of breath.    Cardiovascular:  Negative for chest pain and palpitations.   Gastrointestinal:  Negative for abdominal pain, constipation and diarrhea.   Genitourinary:  Negative for dysuria and hematuria.   Musculoskeletal:  Negative for back pain and neck pain.   Skin:  Negative for rash.   Neurological:  Negative for seizures, weakness and headaches.   Hematological:  Negative for adenopathy.   Psychiatric/Behavioral:  Negative for confusion. The patient is not nervous/anxious.          Objective:      /68 (BP Location: Left arm, Patient Position: Sitting, Cuff Size: Standard)   Pulse 71   Temp (!) 96.5 °F (35.8 °C) (Temporal)   Ht 5' 1\" (1.549 m)   Wt 62.1 kg (137 lb)   SpO2 96%   BMI 25.89 kg/m²     Results Reviewed       None            Recent Results (from the past 1344 hour(s))   POCT Rapid Covid Ag    Collection Time: 10/08/24  2:51 PM   Result Value Ref Range    POCT SARS-CoV-2 Ag Positive (A) Negative    VALID CONTROL Valid    POCT rapid flu A and B    Collection Time: 10/08/24  2:51 PM   Result Value Ref " "Range    RAPID FLU A neg     RAPID FLU B neg    CBC and differential    Collection Time: 10/09/24  9:03 PM   Result Value Ref Range    WBC 4.54 4.31 - 10.16 Thousand/uL    RBC 3.83 3.81 - 5.12 Million/uL    Hemoglobin 12.4 11.5 - 15.4 g/dL    Hematocrit 38.2 34.8 - 46.1 %     (H) 82 - 98 fL    MCH 32.4 26.8 - 34.3 pg    MCHC 32.5 31.4 - 37.4 g/dL    RDW 12.3 11.6 - 15.1 %    MPV 10.7 8.9 - 12.7 fL    Platelets 165 149 - 390 Thousands/uL    nRBC 0 /100 WBCs    Segmented % 41 (L) 43 - 75 %    Immature Grans % 1 0 - 2 %    Lymphocytes % 46 (H) 14 - 44 %    Monocytes % 8 4 - 12 %    Eosinophils Relative 3 0 - 6 %    Basophils Relative 1 0 - 1 %    Absolute Neutrophils 1.86 1.85 - 7.62 Thousands/µL    Absolute Immature Grans 0.03 0.00 - 0.20 Thousand/uL    Absolute Lymphocytes 2.14 0.60 - 4.47 Thousands/µL    Absolute Monocytes 0.36 0.17 - 1.22 Thousand/µL    Eosinophils Absolute 0.12 0.00 - 0.61 Thousand/µL    Basophils Absolute 0.03 0.00 - 0.10 Thousands/µL   Basic metabolic panel    Collection Time: 10/09/24  9:03 PM   Result Value Ref Range    Sodium 134 (L) 135 - 147 mmol/L    Potassium 4.1 3.5 - 5.3 mmol/L    Chloride 104 96 - 108 mmol/L    CO2 23 21 - 32 mmol/L    ANION GAP 7 4 - 13 mmol/L    BUN 8 5 - 25 mg/dL    Creatinine 0.71 0.60 - 1.30 mg/dL    Glucose 132 65 - 140 mg/dL    Calcium 8.6 8.4 - 10.2 mg/dL    eGFR 77 ml/min/1.73sq m   HS Troponin 0hr (reflex protocol)    Collection Time: 10/09/24  9:03 PM   Result Value Ref Range    hs TnI 0hr 10 \"Refer to ACS Flowchart\"- see link ng/L   ECG 12 lead    Collection Time: 10/09/24  9:09 PM   Result Value Ref Range    Ventricular Rate 52 BPM    Atrial Rate 52 BPM    SD Interval 132 ms    QRSD Interval 142 ms    QT Interval 494 ms    QTC Interval 459 ms    P Axis 53 degrees    QRS Axis 21 degrees    T Wave [place] 226 degrees   HS Troponin I 2hr    Collection Time: 10/09/24 10:57 PM   Result Value Ref Range    hs TnI 2hr 9 \"Refer to ACS Flowchart\"- see link ng/L "    Delta 2hr hsTnI -1 <20 ng/L   POCT Rapid Covid Ag    Collection Time: 10/22/24  3:03 PM   Result Value Ref Range    POCT SARS-CoV-2 Ag Negative Negative    VALID CONTROL Valid         Physical Exam  Constitutional:       General: She is not in acute distress.     Appearance: Normal appearance.   HENT:      Head: Normocephalic and atraumatic.      Nose: Nose normal.      Mouth/Throat:      Mouth: Mucous membranes are moist.   Eyes:      Extraocular Movements: Extraocular movements intact.      Pupils: Pupils are equal, round, and reactive to light.   Cardiovascular:      Rate and Rhythm: Normal rate and regular rhythm.      Pulses: Normal pulses.      Heart sounds: Normal heart sounds. No murmur heard.     No friction rub.   Pulmonary:      Effort: Pulmonary effort is normal. No respiratory distress.      Breath sounds: Normal breath sounds. No wheezing.   Abdominal:      General: Abdomen is flat. Bowel sounds are normal. There is no distension.      Palpations: Abdomen is soft. There is no mass.      Tenderness: There is no abdominal tenderness. There is no guarding.   Musculoskeletal:         General: Normal range of motion.      Cervical back: Neck supple.   Skin:     General: Skin is warm.   Neurological:      General: No focal deficit present.      Mental Status: She is alert and oriented to person, place, and time. Mental status is at baseline.      Cranial Nerves: No cranial nerve deficit.   Psychiatric:         Mood and Affect: Mood normal.         Behavior: Behavior normal.

## 2024-12-03 ENCOUNTER — CONSULT (OUTPATIENT)
Dept: INTERNAL MEDICINE CLINIC | Facility: CLINIC | Age: 84
End: 2024-12-03
Payer: COMMERCIAL

## 2024-12-03 VITALS
TEMPERATURE: 97.3 F | HEART RATE: 61 BPM | HEIGHT: 61 IN | BODY MASS INDEX: 26.81 KG/M2 | WEIGHT: 142 LBS | SYSTOLIC BLOOD PRESSURE: 134 MMHG | DIASTOLIC BLOOD PRESSURE: 78 MMHG | OXYGEN SATURATION: 97 %

## 2024-12-03 DIAGNOSIS — E78.2 MIXED HYPERLIPIDEMIA: ICD-10-CM

## 2024-12-03 DIAGNOSIS — H40.1130 PRIMARY OPEN ANGLE GLAUCOMA OF BOTH EYES, UNSPECIFIED GLAUCOMA STAGE: ICD-10-CM

## 2024-12-03 DIAGNOSIS — H25.9 AGE-RELATED CATARACT OF BOTH EYES, UNSPECIFIED AGE-RELATED CATARACT TYPE: Primary | ICD-10-CM

## 2024-12-03 DIAGNOSIS — Z01.818 PRE-OPERATIVE CLEARANCE: ICD-10-CM

## 2024-12-03 DIAGNOSIS — M81.0 AGE-RELATED OSTEOPOROSIS WITHOUT CURRENT PATHOLOGICAL FRACTURE: ICD-10-CM

## 2024-12-03 DIAGNOSIS — I10 ESSENTIAL HYPERTENSION: ICD-10-CM

## 2024-12-03 DIAGNOSIS — K21.9 GASTROESOPHAGEAL REFLUX DISEASE WITHOUT ESOPHAGITIS: ICD-10-CM

## 2024-12-03 PROCEDURE — G2211 COMPLEX E/M VISIT ADD ON: HCPCS | Performed by: INTERNAL MEDICINE

## 2024-12-03 PROCEDURE — 99214 OFFICE O/P EST MOD 30 MIN: CPT | Performed by: INTERNAL MEDICINE

## 2024-12-03 NOTE — PROGRESS NOTES
Pre-operative Clearance  Name: Ralf Ayala      : 1940      MRN: [id number]  Encounter Provider: Juan Winters MD  Encounter Date: 12/3/2024   Encounter department: Counts include 234 beds at the [facility]    Assessment & Plan  Age-related cataract of both eyes, unspecified age-related cataract type  Patient is medically cleared to proceed with proposed surgeries.       Primary open angle glaucoma of both eyes, unspecified glaucoma stage  Patient is medically cleared to proceed with proposed surgeries.       Essential hypertension         Age-related osteoporosis without current pathological fracture         Mixed hyperlipidemia         Gastroesophageal reflux disease without esophagitis         Pre-op Plan     History of Present Illness     HPI  Review of Systems   Constitutional:  Negative for appetite change, chills, fatigue and fever.   HENT:  Negative for sore throat and trouble swallowing.    Eyes:  Positive for visual disturbance. Negative for redness.   Respiratory:  Negative for shortness of breath.    Cardiovascular:  Negative for chest pain and palpitations.   Gastrointestinal:  Negative for abdominal pain, constipation and diarrhea.   Genitourinary:  Negative for dysuria and hematuria.   Musculoskeletal:  Negative for back pain and neck pain.   Skin:  Negative for rash.   Neurological:  Negative for seizures, weakness and headaches.   Hematological:  Negative for adenopathy.   Psychiatric/Behavioral:  Negative for confusion. The patient is not nervous/anxious.      Past Medical History   Past Medical History:   Diagnosis Date    Aortic insufficiency     Cee's esophagus     Gastroesophageal reflux disease     Glaucoma     Hypercholesterolemia     Hyperlipidemia     Hypertension     Kidney stones 2009    Low back pain     Major depressive disorder     Mitral valve insufficiency     Osteoporosis     Osteoporosis     Vitamin D deficiency      Past Surgical History:   Procedure  Laterality Date    COLONOSCOPY  06/02/2011    Normal     CYSTOSCOPY  2009    Polyp, stones    MAMMO (HISTORICAL)  09/19/2016    Negative     OTHER SURGICAL HISTORY      Infection in arm      Family History   Problem Relation Age of Onset    Cancer Mother 90        unknown type of abdominal cancer    No Known Problems Father     No Known Problems Sister     No Known Problems Daughter     No Known Problems Daughter     No Known Problems Maternal Grandmother     No Known Problems Maternal Grandfather     No Known Problems Paternal Grandmother     No Known Problems Paternal Grandfather     Lung cancer Brother 70    Lung cancer Brother 65    No Known Problems Son     Breast cancer Neg Hx      Social History     Tobacco Use    Smoking status: Never    Smokeless tobacco: Never   Vaping Use    Vaping status: Never Used   Substance and Sexual Activity    Alcohol use: Not Currently    Drug use: No    Sexual activity: Not Currently     Current Outpatient Medications on File Prior to Visit   Medication Sig    acetaminophen (TYLENOL) 325 mg tablet As needed body aches    albuterol (Ventolin HFA) 90 mcg/act inhaler Inhale 1-2 puffs every 6 (six) hours as needed for wheezing    amLODIPine (NORVASC) 2.5 mg tablet Take 1 tablet (2.5 mg total) by mouth daily    Ascorbic Acid (vitamin C) 1000 MG tablet Take 1,000 mg by mouth daily    cetirizine (ZyrTEC) 10 mg tablet Take 1 tablet (10 mg total) by mouth daily    cholecalciferol (VITAMIN D3) 25 mcg (1,000 units) tablet Take 1,000 Units by mouth daily    citalopram (CeleXA) 20 mg tablet TAKE 1/2 OR 1 TABLET DAILY AS DIRECTED BY DOCTOR    Dextromethorphan Polistirex ER (Delsym) 30 MG/5ML SUER Take 5 mL (30 mg total) by mouth 2 (two) times a day as needed (cough)    dorzolamide (TRUSOPT) 2 % ophthalmic solution     famotidine (PEPCID) 20 mg tablet Take 1 tablet (20 mg total) by mouth 2 (two) times a day    fluticasone (FLONASE) 50 mcg/act nasal spray 1 spray into each nostril daily     "Lumigan 0.01 % ophthalmic drops     montelukast (SINGULAIR) 10 mg tablet Take 1 tablet (10 mg total) by mouth daily at bedtime    Multiple Vitamin tablet Take by mouth    olmesartan (BENICAR) 20 mg tablet Take 1 tablet (20 mg total) by mouth 2 (two) times a day    rosuvastatin (CRESTOR) 5 mg tablet TAKE 1 TABLET (5 MG TOTAL) BY MOUTH DAILY AT BEDTIME    Travatan Z 0.004 % ophthalmic solution     aspirin (ECOTRIN LOW STRENGTH) 81 mg EC tablet Take 81 mg by mouth daily Sometimes (Patient not taking: Reported on 3/21/2023)    sodium chloride (OCEAN) 0.65 % nasal spray 1 spray into each nostril every 2 (two) hours while awake (Patient not taking: Reported on 3/21/2023)     No Known Allergies  Objective   BP (!) 177/73 (BP Location: Left arm, Patient Position: Sitting, Cuff Size: Adult)   Pulse 61   Temp (!) 97.3 °F (36.3 °C) (Temporal)   Ht 5' 1\" (1.549 m)   Wt 64.4 kg (142 lb)   SpO2 97%   BMI 26.83 kg/m²     Physical Exam  Vitals and nursing note reviewed.   Constitutional:       General: She is not in acute distress.     Appearance: She is well-developed.   HENT:      Head: Normocephalic and atraumatic.      Mouth/Throat:      Mouth: Mucous membranes are moist.   Eyes:      Conjunctiva/sclera: Conjunctivae normal.      Comments: Bilateral cataracts present.   Cardiovascular:      Rate and Rhythm: Normal rate and regular rhythm.      Heart sounds: No murmur heard.  Pulmonary:      Effort: Pulmonary effort is normal. No respiratory distress.      Breath sounds: Normal breath sounds.   Abdominal:      Palpations: Abdomen is soft.      Tenderness: There is no abdominal tenderness.   Musculoskeletal:         General: No swelling.      Cervical back: Neck supple.   Skin:     General: Skin is warm and dry.      Capillary Refill: Capillary refill takes less than 2 seconds.   Neurological:      Mental Status: She is alert.   Psychiatric:         Mood and Affect: Mood normal.           Juan Winters MD  "

## 2024-12-13 ENCOUNTER — TELEPHONE (OUTPATIENT)
Age: 84
End: 2024-12-13

## 2024-12-13 NOTE — TELEPHONE ENCOUNTER
Received call from Veronique from the [facility] asking for the last EKG, office visit note, stress test, and echo for pt. Faxed all records to [phone]. No further questions.

## 2025-02-09 ENCOUNTER — OFFICE VISIT (OUTPATIENT)
Dept: URGENT CARE | Age: 85
End: 2025-02-09
Payer: COMMERCIAL

## 2025-02-09 ENCOUNTER — APPOINTMENT (OUTPATIENT)
Dept: RADIOLOGY | Age: 85
End: 2025-02-09
Payer: COMMERCIAL

## 2025-02-09 VITALS — OXYGEN SATURATION: 97 % | TEMPERATURE: 97.2 F | HEART RATE: 54 BPM | RESPIRATION RATE: 20 BRPM

## 2025-02-09 DIAGNOSIS — S86.911A KNEE STRAIN, RIGHT, INITIAL ENCOUNTER: Primary | ICD-10-CM

## 2025-02-09 DIAGNOSIS — R26.2 AMBULATORY DYSFUNCTION: ICD-10-CM

## 2025-02-09 DIAGNOSIS — S86.911A KNEE STRAIN, RIGHT, INITIAL ENCOUNTER: ICD-10-CM

## 2025-02-09 DIAGNOSIS — S86.811A STRAIN OF OTHER MUSCLE(S) AND TENDON(S) AT LOWER LEG LEVEL, RIGHT LEG, INITIAL ENCOUNTER: ICD-10-CM

## 2025-02-09 PROCEDURE — 99203 OFFICE O/P NEW LOW 30 MIN: CPT | Performed by: STUDENT IN AN ORGANIZED HEALTH CARE EDUCATION/TRAINING PROGRAM

## 2025-02-09 PROCEDURE — 73564 X-RAY EXAM KNEE 4 OR MORE: CPT

## 2025-02-09 NOTE — PATIENT INSTRUCTIONS
I do not see an acute fracture or dislocation on your x-ray.  There is some arthritis.  I recommend continuing Tylenol, gentle stretching, massage.  I am also providing you with a referral for physical therapy, they can help both with the pain and with helping you get stronger with your walking.    If Your Symptoms Are Not Adequately Improving, Please Follow with Your PCP.  If You Develop Any Severe Worsening Symptoms Such As swelling, worsening pain, warmth, shortness of breath, chest pain, please go to the ER.    For PT appointment, please call:  central scheduling for an appointment:  [phone] or toll free [phone]

## 2025-02-09 NOTE — PROGRESS NOTES
[facility]        NAME: Ralf Ayala is a 84 y.o. female  : 1940    MRN: [id number]  DATE: 2025  TIME: 12:57 PM    Assessment and Plan   Knee strain, right, initial encounter [S86.911A]  1. Knee strain, right, initial encounter  XR knee 4+ vw right injury      2. Strain of other muscle(s) and tendon(s) at lower leg level, right leg, initial encounter              Patient Instructions     I do not see an acute fracture or dislocation on your x-ray.  There is some arthritis.  I recommend continuing Tylenol, gentle stretching, massage.  I am also providing you with a referral for physical therapy, they can help both with the pain and with helping you get stronger with your walking.    If Your Symptoms Are Not Adequately Improving, Please Follow with Your PCP.  If You Develop Any Severe Worsening Symptoms Such As swelling, worsening pain, warmth, shortness of breath, chest pain, please go to the ER.    For PT appointment, please call:  central scheduling for an appointment:  [phone] or toll free [phone]       Chief Complaint     Chief Complaint   Patient presents with    Knee Pain     Twisted right knee when trying to get into tub last night. No fall but unable to bend the knee or move adequately without assist at that time. Using cane since    Ankle Injury     Previous fall in January but denies c o... feels maybe she is weaker and twist yesterday caused pain through shin         History of Present Illness       Patient presents with her daughter who assist with translation at her request.  She has been having some more mild right knee pain, then last night as she was climbing into the tub, her right leg twisted, felt a twisting pulling sensation through her right calf muscles and posterior right thigh.  Initially also had pain to the ankle and knee, ankle is no longer hurting.  She is still having some trouble with walking due to pain in the musculature around her  knee.  Took Tylenol yesterday and 1 dose today.  Tried Salonpas, all of these have been helpful.  Notes that overall she has been feeling more weak with ambulation since having cataract surgery several months ago.  Does not typically use a cane but using 1 today due to discomfort in her right leg.    Ankle Injury         Review of Systems   Review of Systems   All other systems reviewed and are negative.        Current Medications       Current Outpatient Medications:     acetaminophen (TYLENOL) 325 mg tablet, As needed body aches, Disp: , Rfl:     amLODIPine (NORVASC) 2.5 mg tablet, Take 1 tablet (2.5 mg total) by mouth daily, Disp: 90 tablet, Rfl: 0    Ascorbic Acid (vitamin C) 1000 MG tablet, Take 1,000 mg by mouth daily, Disp: , Rfl:     cholecalciferol (VITAMIN D3) 25 mcg (1,000 units) tablet, Take 1,000 Units by mouth daily, Disp: , Rfl:     citalopram (CeleXA) 20 mg tablet, TAKE 1/2 OR 1 TABLET DAILY AS DIRECTED BY DOCTOR, Disp: 90 tablet, Rfl: 3    dorzolamide (TRUSOPT) 2 % ophthalmic solution, , Disp: , Rfl:     Lumigan 0.01 % ophthalmic drops, , Disp: , Rfl:     montelukast (SINGULAIR) 10 mg tablet, Take 1 tablet (10 mg total) by mouth daily at bedtime, Disp: 30 tablet, Rfl: 2    Multiple Vitamin tablet, Take by mouth, Disp: , Rfl:     olmesartan (BENICAR) 20 mg tablet, Take 1 tablet (20 mg total) by mouth 2 (two) times a day, Disp: 180 tablet, Rfl: 0    rosuvastatin (CRESTOR) 5 mg tablet, TAKE 1 TABLET (5 MG TOTAL) BY MOUTH DAILY AT BEDTIME, Disp: 90 tablet, Rfl: 1    sodium chloride (OCEAN) 0.65 % nasal spray, 1 spray into each nostril every 2 (two) hours while awake, Disp: 60 mL, Rfl: 1    Travatan Z 0.004 % ophthalmic solution, , Disp: , Rfl:     albuterol (Ventolin HFA) 90 mcg/act inhaler, Inhale 1-2 puffs every 6 (six) hours as needed for wheezing (Patient not taking: Reported on 2/9/2025), Disp: 1 g, Rfl: 0    aspirin (ECOTRIN LOW STRENGTH) 81 mg EC tablet, Take 81 mg by mouth daily Sometimes  (Patient not taking: Reported on 2/9/2025), Disp: , Rfl:     cetirizine (ZyrTEC) 10 mg tablet, Take 1 tablet (10 mg total) by mouth daily (Patient not taking: Reported on 2/9/2025), Disp: 30 tablet, Rfl: 2    Dextromethorphan Polistirex ER (Delsym) 30 MG/5ML SUER, Take 5 mL (30 mg total) by mouth 2 (two) times a day as needed (cough), Disp: 120 mL, Rfl: 0    famotidine (PEPCID) 20 mg tablet, Take 1 tablet (20 mg total) by mouth 2 (two) times a day, Disp: 180 tablet, Rfl: 1    fluticasone (FLONASE) 50 mcg/act nasal spray, 1 spray into each nostril daily (Patient not taking: Reported on 2/9/2025), Disp: 10 mL, Rfl: 2    Current Allergies     Allergies as of 02/09/2025    (No Known Allergies)            The following portions of the patient's history were reviewed and updated as appropriate: allergies, current medications, past family history, past medical history, past social history, past surgical history and problem list.     Past Medical History:   Diagnosis Date    Aortic insufficiency     Cee's esophagus     Gastroesophageal reflux disease     Glaucoma     Hypercholesterolemia     Hyperlipidemia     Hypertension     Kidney stones 2009    Low back pain     Major depressive disorder     Mitral valve insufficiency     Osteoporosis     Osteoporosis     Vitamin D deficiency        Past Surgical History:   Procedure Laterality Date    CATARACT EXTRACTION      COLONOSCOPY  06/02/2011    Normal     CYSTOSCOPY  2009    Polyp, stones    GLAUCOMA SURGERY      MAMMO (HISTORICAL)  09/19/2016    Negative     OTHER SURGICAL HISTORY      Infection in arm        Family History   Problem Relation Age of Onset    Cancer Mother 90        unknown type of abdominal cancer    No Known Problems Father     No Known Problems Sister     No Known Problems Daughter     No Known Problems Daughter     No Known Problems Maternal Grandmother     No Known Problems Maternal Grandfather     No Known Problems Paternal Grandmother     No Known  Problems Paternal Grandfather     Lung cancer Brother 70    Lung cancer Brother 65    No Known Problems Son     Breast cancer Neg Hx          Medications have been verified.        Objective   Pulse (!) 54   Temp (!) 97.2 °F (36.2 °C)   Resp 20   SpO2 97%   No LMP recorded. Patient is postmenopausal.       Physical Exam     Physical Exam  Vitals and nursing note reviewed.   Constitutional:       General: She is not in acute distress.     Appearance: She is not toxic-appearing.   HENT:      Head: Normocephalic and atraumatic.      Right Ear: External ear normal.      Left Ear: External ear normal.      Nose: Nose normal.      Mouth/Throat:      Mouth: Mucous membranes are moist.   Eyes:      Extraocular Movements: Extraocular movements intact.      Conjunctiva/sclera: Conjunctivae normal.   Musculoskeletal:         General: No swelling or tenderness.      Comments: Nontender to palpation throughout her right knee, posterior thigh, calf, ankle.  Full right ankle ROM, no pain to the ankle with this, feels a pulling sensation in her calf and posterior thigh.  Full flexion extension of right knee with no pain to the knee joint.  No swelling.  No wound.  Negative anterior, posterior, varus, valgus stress, negative Cynthia.  Antalgic gait       Skin:     General: Skin is warm and dry.   Neurological:      Mental Status: She is alert.

## 2025-03-12 DIAGNOSIS — I10 ESSENTIAL HYPERTENSION: ICD-10-CM

## 2025-03-13 RX ORDER — AMLODIPINE BESYLATE 2.5 MG/1
2.5 TABLET ORAL DAILY
Qty: 90 TABLET | Refills: 1 | Status: SHIPPED | OUTPATIENT
Start: 2025-03-13

## 2025-03-13 RX ORDER — OLMESARTAN MEDOXOMIL 20 MG/1
20 TABLET ORAL 2 TIMES DAILY
Qty: 180 TABLET | Refills: 1 | Status: SHIPPED | OUTPATIENT
Start: 2025-03-13

## 2025-03-26 ENCOUNTER — TELEPHONE (OUTPATIENT)
Age: 85
End: 2025-03-26

## 2025-03-26 NOTE — TELEPHONE ENCOUNTER
Patient called back and I read her the message from Dr. Winters regarding lab orders.  Patient understood.

## 2025-04-08 ENCOUNTER — OFFICE VISIT (OUTPATIENT)
Dept: INTERNAL MEDICINE CLINIC | Facility: CLINIC | Age: 85
End: 2025-04-08
Payer: COMMERCIAL

## 2025-04-08 VITALS
SYSTOLIC BLOOD PRESSURE: 130 MMHG | OXYGEN SATURATION: 97 % | HEIGHT: 61 IN | DIASTOLIC BLOOD PRESSURE: 76 MMHG | BODY MASS INDEX: 27.3 KG/M2 | HEART RATE: 57 BPM | WEIGHT: 144.6 LBS | TEMPERATURE: 97.1 F

## 2025-04-08 DIAGNOSIS — F41.9 ANXIETY: ICD-10-CM

## 2025-04-08 DIAGNOSIS — E78.2 MIXED HYPERLIPIDEMIA: ICD-10-CM

## 2025-04-08 DIAGNOSIS — I10 ESSENTIAL HYPERTENSION: Primary | ICD-10-CM

## 2025-04-08 DIAGNOSIS — M17.11 PRIMARY OSTEOARTHRITIS OF RIGHT KNEE: ICD-10-CM

## 2025-04-08 DIAGNOSIS — M81.0 AGE-RELATED OSTEOPOROSIS WITHOUT CURRENT PATHOLOGICAL FRACTURE: ICD-10-CM

## 2025-04-08 PROCEDURE — 99214 OFFICE O/P EST MOD 30 MIN: CPT | Performed by: INTERNAL MEDICINE

## 2025-04-08 PROCEDURE — G2211 COMPLEX E/M VISIT ADD ON: HCPCS | Performed by: INTERNAL MEDICINE

## 2025-04-08 RX ORDER — CITALOPRAM HYDROBROMIDE 20 MG/1
20 TABLET ORAL DAILY
Qty: 90 TABLET | Refills: 3 | Status: SHIPPED | OUTPATIENT
Start: 2025-04-08

## 2025-04-08 NOTE — PROGRESS NOTES
Assessment/Plan:           1. Essential hypertension  Comments:  Stable continue amlodipine and olmesartan.  2. Age-related osteoporosis forearm without current pathological fracture  Comments:  Advised to follow-up with rheumatology.  3. Mixed hyperlipidemia  -     CBC and differential; Future  -     Comprehensive metabolic panel; Future  -     TSH, 3rd generation; Future  -     Lipid panel; Future  -     Urinalysis with microscopic; Future  4. Primary osteoarthritis of right knee  Comments:  She will pursue conservative management for now.  5. Anxiety  Comments:  Continue citalopram.  Orders:  -     citalopram (CeleXA) 20 mg tablet; Take 1 tablet (20 mg total) by mouth daily         1. Essential hypertension (Primary)      2. Age-related osteoporosis without current pathological fracture      3. Mixed hyperlipidemia      4. Primary osteoarthritis of right knee         No problem-specific Assessment & Plan notes found for this encounter.           Subjective:      Patient ID: Ralf Ayala is a 84 y.o. female.    HPI    The following portions of the patient's history were reviewed and updated as appropriate: She  has a past medical history of Aortic insufficiency, Cee's esophagus, Gastroesophageal reflux disease, Glaucoma, Hypercholesterolemia, Hyperlipidemia, Hypertension, Kidney stones (2009), Low back pain, Major depressive disorder, Mitral valve insufficiency, Osteoporosis, Osteoporosis, and Vitamin D deficiency.  She   Patient Active Problem List    Diagnosis Date Noted    Age-related osteoporosis without current pathological fracture 03/21/2023    Fall 06/16/2022    Pancreatic cyst 03/19/2021    Vitamin D deficiency 03/19/2021    Dysthymia 03/19/2021    Gastroesophageal reflux disease without esophagitis 03/19/2021    Essential hypertension 01/19/2018    Hyperlipemia 01/19/2018    Anxiety 01/19/2018    Bradycardia 01/19/2018    Dizziness 01/19/2018     She  has a past surgical history that includes  Cystoscopy (2009); Other surgical history; Colonoscopy (06/02/2011); Mammo (historical) (09/19/2016); Cataract extraction; and Glaucoma surgery.  Her family history includes Cancer (age of onset: 90) in her mother; Lung cancer (age of onset: 65) in her brother; Lung cancer (age of onset: 70) in her brother; No Known Problems in her daughter, daughter, father, maternal grandfather, maternal grandmother, paternal grandfather, paternal grandmother, sister, and son.  She  reports that she has never smoked. She has never used smokeless tobacco. She reports that she does not currently use alcohol. She reports that she does not use drugs.  Current Outpatient Medications   Medication Sig Dispense Refill    acetaminophen (TYLENOL) 325 mg tablet As needed body aches      amLODIPine (NORVASC) 2.5 mg tablet Take 1 tablet (2.5 mg total) by mouth daily 90 tablet 1    Ascorbic Acid (vitamin C) 1000 MG tablet Take 1,000 mg by mouth daily      cholecalciferol (VITAMIN D3) 25 mcg (1,000 units) tablet Take 1,000 Units by mouth daily      citalopram (CeleXA) 20 mg tablet Take 1 tablet (20 mg total) by mouth daily 90 tablet 3    Dextromethorphan Polistirex ER (Delsym) 30 MG/5ML SUER Take 5 mL (30 mg total) by mouth 2 (two) times a day as needed (cough) 120 mL 0    dorzolamide (TRUSOPT) 2 % ophthalmic solution       Lumigan 0.01 % ophthalmic drops       montelukast (SINGULAIR) 10 mg tablet Take 1 tablet (10 mg total) by mouth daily at bedtime 30 tablet 2    Multiple Vitamin tablet Take by mouth      olmesartan (BENICAR) 20 mg tablet Take 1 tablet (20 mg total) by mouth 2 (two) times a day 180 tablet 1    rosuvastatin (CRESTOR) 5 mg tablet TAKE 1 TABLET (5 MG TOTAL) BY MOUTH DAILY AT BEDTIME 90 tablet 1    sodium chloride (OCEAN) 0.65 % nasal spray 1 spray into each nostril every 2 (two) hours while awake 60 mL 1    Travatan Z 0.004 % ophthalmic solution       albuterol (Ventolin HFA) 90 mcg/act inhaler Inhale 1-2 puffs every 6 (six)  hours as needed for wheezing (Patient not taking: Reported on 2/9/2025) 1 g 0    aspirin (ECOTRIN LOW STRENGTH) 81 mg EC tablet Take 81 mg by mouth daily Sometimes (Patient not taking: Reported on 2/9/2025)      cetirizine (ZyrTEC) 10 mg tablet Take 1 tablet (10 mg total) by mouth daily (Patient not taking: Reported on 2/9/2025) 30 tablet 2    famotidine (PEPCID) 20 mg tablet Take 1 tablet (20 mg total) by mouth 2 (two) times a day (Patient not taking: Reported on 4/8/2025) 180 tablet 1    fluticasone (FLONASE) 50 mcg/act nasal spray 1 spray into each nostril daily (Patient not taking: Reported on 2/9/2025) 10 mL 2     No current facility-administered medications for this visit.     Current Outpatient Medications on File Prior to Visit   Medication Sig    acetaminophen (TYLENOL) 325 mg tablet As needed body aches    amLODIPine (NORVASC) 2.5 mg tablet Take 1 tablet (2.5 mg total) by mouth daily    Ascorbic Acid (vitamin C) 1000 MG tablet Take 1,000 mg by mouth daily    cholecalciferol (VITAMIN D3) 25 mcg (1,000 units) tablet Take 1,000 Units by mouth daily    Dextromethorphan Polistirex ER (Delsym) 30 MG/5ML SUER Take 5 mL (30 mg total) by mouth 2 (two) times a day as needed (cough)    dorzolamide (TRUSOPT) 2 % ophthalmic solution     Lumigan 0.01 % ophthalmic drops     montelukast (SINGULAIR) 10 mg tablet Take 1 tablet (10 mg total) by mouth daily at bedtime    Multiple Vitamin tablet Take by mouth    olmesartan (BENICAR) 20 mg tablet Take 1 tablet (20 mg total) by mouth 2 (two) times a day    rosuvastatin (CRESTOR) 5 mg tablet TAKE 1 TABLET (5 MG TOTAL) BY MOUTH DAILY AT BEDTIME    sodium chloride (OCEAN) 0.65 % nasal spray 1 spray into each nostril every 2 (two) hours while awake    Travatan Z 0.004 % ophthalmic solution     [DISCONTINUED] citalopram (CeleXA) 20 mg tablet TAKE 1/2 OR 1 TABLET DAILY AS DIRECTED BY DOCTOR    albuterol (Ventolin HFA) 90 mcg/act inhaler Inhale 1-2 puffs every 6 (six) hours as needed  "for wheezing (Patient not taking: Reported on 2/9/2025)    aspirin (ECOTRIN LOW STRENGTH) 81 mg EC tablet Take 81 mg by mouth daily Sometimes (Patient not taking: Reported on 2/9/2025)    cetirizine (ZyrTEC) 10 mg tablet Take 1 tablet (10 mg total) by mouth daily (Patient not taking: Reported on 2/9/2025)    famotidine (PEPCID) 20 mg tablet Take 1 tablet (20 mg total) by mouth 2 (two) times a day (Patient not taking: Reported on 4/8/2025)    fluticasone (FLONASE) 50 mcg/act nasal spray 1 spray into each nostril daily (Patient not taking: Reported on 2/9/2025)     No current facility-administered medications on file prior to visit.     Medications Discontinued During This Encounter   Medication Reason    citalopram (CeleXA) 20 mg tablet Reorder      She has no known allergies..    Review of Systems   Constitutional:  Negative for appetite change, chills, fatigue and fever.   HENT:  Negative for sore throat and trouble swallowing.    Eyes:  Negative for redness.   Respiratory:  Negative for shortness of breath.    Cardiovascular:  Negative for chest pain and palpitations.   Gastrointestinal:  Negative for abdominal pain, constipation and diarrhea.   Genitourinary:  Negative for dysuria and hematuria.   Musculoskeletal:  Positive for arthralgias and gait problem. Negative for back pain and neck pain.   Skin:  Negative for rash.   Neurological:  Negative for seizures, weakness and headaches.   Hematological:  Negative for adenopathy.   Psychiatric/Behavioral:  Negative for confusion. The patient is not nervous/anxious.          Objective:      /76 (BP Location: Left arm, Patient Position: Sitting, Cuff Size: Standard)   Pulse 57   Temp (!) 97.1 °F (36.2 °C) (Temporal)   Ht 5' 1\" (1.549 m)   Wt 65.6 kg (144 lb 9.6 oz)   SpO2 97%   BMI 27.32 kg/m²     Results Reviewed       None            No results found for this or any previous visit (from the past 8 weeks).     Physical Exam  Constitutional:       General: " She is not in acute distress.     Appearance: Normal appearance.   HENT:      Head: Normocephalic and atraumatic.      Nose: Nose normal.      Mouth/Throat:      Mouth: Mucous membranes are moist.   Eyes:      Extraocular Movements: Extraocular movements intact.      Pupils: Pupils are equal, round, and reactive to light.   Cardiovascular:      Rate and Rhythm: Normal rate and regular rhythm.      Pulses: Normal pulses.      Heart sounds: Normal heart sounds. No murmur heard.     No friction rub.   Pulmonary:      Effort: Pulmonary effort is normal. No respiratory distress.      Breath sounds: Normal breath sounds. No wheezing.   Abdominal:      General: Abdomen is flat. Bowel sounds are normal. There is no distension.      Palpations: Abdomen is soft. There is no mass.      Tenderness: There is no abdominal tenderness. There is no guarding.   Musculoskeletal:         General: Swelling present. Normal range of motion.      Cervical back: Neck supple.   Neurological:      General: No focal deficit present.      Mental Status: She is alert and oriented to person, place, and time. Mental status is at baseline.      Cranial Nerves: No cranial nerve deficit.   Psychiatric:         Mood and Affect: Mood normal.         Behavior: Behavior normal.

## 2025-04-22 ENCOUNTER — APPOINTMENT (OUTPATIENT)
Dept: LAB | Facility: CLINIC | Age: 85
End: 2025-04-22
Attending: INTERNAL MEDICINE
Payer: COMMERCIAL

## 2025-04-22 ENCOUNTER — HOSPITAL ENCOUNTER (OUTPATIENT)
Dept: RADIOLOGY | Facility: HOSPITAL | Age: 85
Discharge: HOME | End: 2025-04-22
Attending: INTERNAL MEDICINE
Payer: COMMERCIAL

## 2025-04-22 DIAGNOSIS — E78.2 MIXED HYPERLIPIDEMIA: ICD-10-CM

## 2025-04-22 DIAGNOSIS — J98.11 ATELECTASIS: ICD-10-CM

## 2025-04-22 LAB
ALBUMIN SERPL BCG-MCNC: 3.9 G/DL (ref 3.5–5)
ALP SERPL-CCNC: 52 U/L (ref 34–104)
ALT SERPL W P-5'-P-CCNC: 11 U/L (ref 7–52)
ANION GAP SERPL CALCULATED.3IONS-SCNC: 8 MMOL/L (ref 4–13)
AST SERPL W P-5'-P-CCNC: 16 U/L (ref 13–39)
BACTERIA UR QL AUTO: ABNORMAL /HPF
BASOPHILS # BLD AUTO: 0.05 THOUSANDS/ÂΜL (ref 0–0.1)
BASOPHILS NFR BLD AUTO: 1 % (ref 0–1)
BILIRUB SERPL-MCNC: 0.56 MG/DL (ref 0.2–1)
BILIRUB UR QL STRIP: NEGATIVE
BUN SERPL-MCNC: 20 MG/DL (ref 5–25)
CALCIUM SERPL-MCNC: 9.1 MG/DL (ref 8.4–10.2)
CHLORIDE SERPL-SCNC: 107 MMOL/L (ref 96–108)
CHOLEST SERPL-MCNC: 258 MG/DL (ref ?–200)
CLARITY UR: CLEAR
CO2 SERPL-SCNC: 26 MMOL/L (ref 21–32)
COLOR UR: YELLOW
CREAT SERPL-MCNC: 0.96 MG/DL (ref 0.6–1.3)
EOSINOPHIL # BLD AUTO: 0.15 THOUSAND/ÂΜL (ref 0–0.61)
EOSINOPHIL NFR BLD AUTO: 3 % (ref 0–6)
ERYTHROCYTE [DISTWIDTH] IN BLOOD BY AUTOMATED COUNT: 13.1 % (ref 11.6–15.1)
GFR SERPL CREATININE-BSD FRML MDRD: 54 ML/MIN/1.73SQ M
GLUCOSE P FAST SERPL-MCNC: 117 MG/DL (ref 65–99)
GLUCOSE UR STRIP-MCNC: NEGATIVE MG/DL
HCT VFR BLD AUTO: 38 % (ref 34.8–46.1)
HDLC SERPL-MCNC: 65 MG/DL
HGB BLD-MCNC: 12.5 G/DL (ref 11.5–15.4)
HGB UR QL STRIP.AUTO: NEGATIVE
HYALINE CASTS #/AREA URNS LPF: ABNORMAL /LPF
IMM GRANULOCYTES # BLD AUTO: 0.02 THOUSAND/UL (ref 0–0.2)
IMM GRANULOCYTES NFR BLD AUTO: 0 % (ref 0–2)
KETONES UR STRIP-MCNC: NEGATIVE MG/DL
LDLC SERPL CALC-MCNC: 164 MG/DL (ref 0–100)
LEUKOCYTE ESTERASE UR QL STRIP: ABNORMAL
LYMPHOCYTES # BLD AUTO: 2.27 THOUSANDS/ÂΜL (ref 0.6–4.47)
LYMPHOCYTES NFR BLD AUTO: 40 % (ref 14–44)
MCH RBC QN AUTO: 33 PG (ref 26.8–34.3)
MCHC RBC AUTO-ENTMCNC: 32.9 G/DL (ref 31.4–37.4)
MCV RBC AUTO: 100 FL (ref 82–98)
MONOCYTES # BLD AUTO: 0.37 THOUSAND/ÂΜL (ref 0.17–1.22)
MONOCYTES NFR BLD AUTO: 7 % (ref 4–12)
MUCOUS THREADS UR QL AUTO: ABNORMAL
NEUTROPHILS # BLD AUTO: 2.82 THOUSANDS/ÂΜL (ref 1.85–7.62)
NEUTS SEG NFR BLD AUTO: 49 % (ref 43–75)
NITRITE UR QL STRIP: NEGATIVE
NON-SQ EPI CELLS URNS QL MICRO: ABNORMAL /HPF
NONHDLC SERPL-MCNC: 193 MG/DL
NRBC BLD AUTO-RTO: 0 /100 WBCS
PH UR STRIP.AUTO: 5.5 [PH]
PLATELET # BLD AUTO: 200 THOUSANDS/UL (ref 149–390)
PMV BLD AUTO: 10 FL (ref 8.9–12.7)
POTASSIUM SERPL-SCNC: 4.9 MMOL/L (ref 3.5–5.3)
PROT SERPL-MCNC: 7 G/DL (ref 6.4–8.4)
PROT UR STRIP-MCNC: ABNORMAL MG/DL
RBC # BLD AUTO: 3.79 MILLION/UL (ref 3.81–5.12)
RBC #/AREA URNS AUTO: ABNORMAL /HPF
SODIUM SERPL-SCNC: 141 MMOL/L (ref 135–147)
SP GR UR STRIP.AUTO: 1.02 (ref 1–1.03)
TRIGL SERPL-MCNC: 147 MG/DL (ref ?–150)
TSH SERPL DL<=0.05 MIU/L-ACNC: 1.84 UIU/ML (ref 0.45–4.5)
UROBILINOGEN UR STRIP-ACNC: <2 MG/DL
WBC # BLD AUTO: 5.68 THOUSAND/UL (ref 4.31–10.16)
WBC #/AREA URNS AUTO: ABNORMAL /HPF

## 2025-04-22 PROCEDURE — 80053 COMPREHEN METABOLIC PANEL: CPT

## 2025-04-22 PROCEDURE — 85025 COMPLETE CBC W/AUTO DIFF WBC: CPT

## 2025-04-22 PROCEDURE — 81001 URINALYSIS AUTO W/SCOPE: CPT

## 2025-04-22 PROCEDURE — 84443 ASSAY THYROID STIM HORMONE: CPT

## 2025-04-22 PROCEDURE — 36415 COLL VENOUS BLD VENIPUNCTURE: CPT

## 2025-04-22 PROCEDURE — 71046 X-RAY EXAM CHEST 2 VIEWS: CPT

## 2025-04-22 PROCEDURE — 80061 LIPID PANEL: CPT

## 2025-04-23 ENCOUNTER — RESULTS FOLLOW-UP (OUTPATIENT)
Dept: INTERNAL MEDICINE CLINIC | Facility: CLINIC | Age: 85
End: 2025-04-23

## 2025-04-24 NOTE — TELEPHONE ENCOUNTER
Read Dr. Winters's notes on the Xray word for word. She asked if someone can reach out when results of her lab work are ready to be discussed.

## 2025-04-29 ENCOUNTER — OFFICE VISIT (OUTPATIENT)
Dept: INTERNAL MEDICINE CLINIC | Facility: CLINIC | Age: 85
End: 2025-04-29
Payer: COMMERCIAL

## 2025-04-29 VITALS
WEIGHT: 142 LBS | DIASTOLIC BLOOD PRESSURE: 80 MMHG | SYSTOLIC BLOOD PRESSURE: 136 MMHG | TEMPERATURE: 97.8 F | HEIGHT: 61 IN | OXYGEN SATURATION: 97 % | BODY MASS INDEX: 26.81 KG/M2 | HEART RATE: 69 BPM

## 2025-04-29 DIAGNOSIS — E78.2 MIXED HYPERLIPIDEMIA: ICD-10-CM

## 2025-04-29 DIAGNOSIS — J30.89 NON-SEASONAL ALLERGIC RHINITIS, UNSPECIFIED TRIGGER: ICD-10-CM

## 2025-04-29 DIAGNOSIS — K21.9 GASTROESOPHAGEAL REFLUX DISEASE WITHOUT ESOPHAGITIS: ICD-10-CM

## 2025-04-29 DIAGNOSIS — M81.0 AGE-RELATED OSTEOPOROSIS WITHOUT CURRENT PATHOLOGICAL FRACTURE: ICD-10-CM

## 2025-04-29 DIAGNOSIS — I10 ESSENTIAL HYPERTENSION: Primary | ICD-10-CM

## 2025-04-29 PROCEDURE — 99214 OFFICE O/P EST MOD 30 MIN: CPT | Performed by: INTERNAL MEDICINE

## 2025-04-29 PROCEDURE — G2211 COMPLEX E/M VISIT ADD ON: HCPCS | Performed by: INTERNAL MEDICINE

## 2025-04-29 RX ORDER — ROSUVASTATIN CALCIUM 5 MG/1
5 TABLET, COATED ORAL
Start: 2025-04-29

## 2025-04-29 NOTE — PROGRESS NOTES
Name: Ralf Ayala      : 1940      MRN: [id number]  Encounter Provider: Juan Winters MD  Encounter Date: 2025   Encounter department: [facility] to [facility] [address]    :  Assessment & Plan  Essential hypertension  -Manual /61 this visit  -Continue on amlodipine 2.5 mg and amlodipine 20 mg daily        Age-related osteoporosis forearm without current pathological fracture  -Referral to rheumatology   Orders:    Ambulatory Referral to Rheumatology; Future    Mixed hyperlipidemia  -LDL panel shows slight improvement in cholesterol/LDL  -Continue Crestor 5 mg daily   Orders:    rosuvastatin (CRESTOR) 5 mg tablet; Take 1 tablet (5 mg total) by mouth daily at bedtime    Non-seasonal allergic rhinitis, unspecified trigger  -Symptoms stable today  -Continue Montelukast and Flonase        Gastroesophageal reflux disease without esophagitis  -Symptoms stable  -Continue Pepcid 20 mg daily                 History of Present Illness   The patient is an 84-year-old female with a hx of HTN, HLD, GERD, and osteoporosis presenting for lab work review. Today she reports feeling well. Her BP this morning had been in the 160s, but had come down with her medications. She endorses chronic upper back pain. She also endorses worsening hearing for a year. She denies having any chest pain, cough or shortness of breath, abdominal pain, fevers or chills, or headaches.         Review of Systems   Constitutional:  Negative for chills and fever.   HENT:  Positive for hearing loss. Negative for sore throat.    Respiratory:  Negative for cough and shortness of breath.    Cardiovascular:  Negative for chest pain.   Gastrointestinal:  Negative for abdominal pain, nausea and vomiting.   Musculoskeletal:  Positive for back pain.   Neurological:  Negative for light-headedness and headaches.     Past Medical History:   Diagnosis Date    Aortic insufficiency     Cee's esophagus     Gastroesophageal  reflux disease     Glaucoma     Hypercholesterolemia     Hyperlipidemia     Hypertension     Kidney stones 2009    Low back pain     Major depressive disorder     Mitral valve insufficiency     Osteoporosis     Osteoporosis     Vitamin D deficiency      Past Surgical History:   Procedure Laterality Date    CATARACT EXTRACTION      COLONOSCOPY  06/02/2011    Normal     CYSTOSCOPY  2009    Polyp, stones    GLAUCOMA SURGERY      MAMMO (HISTORICAL)  09/19/2016    Negative     OTHER SURGICAL HISTORY      Infection in arm      Family History   Problem Relation Age of Onset    Cancer Mother 90        unknown type of abdominal cancer    No Known Problems Father     No Known Problems Sister     No Known Problems Daughter     No Known Problems Daughter     No Known Problems Maternal Grandmother     No Known Problems Maternal Grandfather     No Known Problems Paternal Grandmother     No Known Problems Paternal Grandfather     Lung cancer Brother 70    Lung cancer Brother 65    No Known Problems Son     Breast cancer Neg Hx      Social History     Tobacco Use    Smoking status: Never    Smokeless tobacco: Never   Vaping Use    Vaping status: Never Used   Substance and Sexual Activity    Alcohol use: Not Currently    Drug use: No    Sexual activity: Not Currently     Current Outpatient Medications on File Prior to Visit   Medication Sig    acetaminophen (TYLENOL) 325 mg tablet As needed body aches    amLODIPine (NORVASC) 2.5 mg tablet Take 1 tablet (2.5 mg total) by mouth daily    Ascorbic Acid (vitamin C) 1000 MG tablet Take 1,000 mg by mouth daily    cholecalciferol (VITAMIN D3) 25 mcg (1,000 units) tablet Take 1,000 Units by mouth daily    citalopram (CeleXA) 20 mg tablet Take 1 tablet (20 mg total) by mouth daily    Dextromethorphan Polistirex ER (Delsym) 30 MG/5ML SUER Take 5 mL (30 mg total) by mouth 2 (two) times a day as needed (cough)    dorzolamide (TRUSOPT) 2 % ophthalmic solution     famotidine (PEPCID) 20 mg tablet  "Take 1 tablet (20 mg total) by mouth 2 (two) times a day    fluticasone (FLONASE) 50 mcg/act nasal spray 1 spray into each nostril daily    Lumigan 0.01 % ophthalmic drops     montelukast (SINGULAIR) 10 mg tablet Take 1 tablet (10 mg total) by mouth daily at bedtime    Multiple Vitamin tablet Take by mouth    olmesartan (BENICAR) 20 mg tablet Take 1 tablet (20 mg total) by mouth 2 (two) times a day    rosuvastatin (CRESTOR) 5 mg tablet TAKE 1 TABLET (5 MG TOTAL) BY MOUTH DAILY AT BEDTIME    sodium chloride (OCEAN) 0.65 % nasal spray 1 spray into each nostril every 2 (two) hours while awake    Travatan Z 0.004 % ophthalmic solution     albuterol (Ventolin HFA) 90 mcg/act inhaler Inhale 1-2 puffs every 6 (six) hours as needed for wheezing (Patient not taking: Reported on 2/9/2025)    aspirin (ECOTRIN LOW STRENGTH) 81 mg EC tablet Take 81 mg by mouth daily Sometimes (Patient not taking: Reported on 2/9/2025)    cetirizine (ZyrTEC) 10 mg tablet Take 1 tablet (10 mg total) by mouth daily (Patient not taking: Reported on 2/9/2025)     No Known Allergies  Immunization History   Administered Date(s) Administered    COVID-19 PFIZER VACCINE 0.3 ML IM 03/11/2021, 04/01/2021    INFLUENZA 10/21/2009, 09/09/2020    Influenza Split High Dose Preservative Free IM 10/22/2024    Pneumococcal Polysaccharide PPV23 06/24/2011    Tdap 04/24/2013    influenza, trivalent, adjuvanted 09/09/2020     Objective   /68 (BP Location: Left arm, Patient Position: Sitting, Cuff Size: Standard)   Pulse 69   Temp 97.8 °F (36.6 °C) (Temporal)   Ht 5' 1\" (1.549 m)   Wt 64.4 kg (142 lb)   SpO2 97%   BMI 26.83 kg/m²       Physical Exam  Constitutional:       General: She is not in acute distress.     Appearance: She is not ill-appearing.   HENT:      Right Ear: Tympanic membrane, ear canal and external ear normal. There is no impacted cerumen.      Left Ear: Tympanic membrane, ear canal and external ear normal. There is no impacted cerumen.    "   Mouth/Throat:      Mouth: Mucous membranes are moist.      Pharynx: Oropharynx is clear. No oropharyngeal exudate or posterior oropharyngeal erythema.   Cardiovascular:      Rate and Rhythm: Normal rate and regular rhythm.      Heart sounds: Normal heart sounds. No murmur heard.  Pulmonary:      Effort: Pulmonary effort is normal. No respiratory distress.      Breath sounds: Normal breath sounds. No stridor. No wheezing, rhonchi or rales.   Abdominal:      General: Abdomen is flat. Bowel sounds are normal. There is no distension.      Palpations: Abdomen is soft.      Tenderness: There is no abdominal tenderness.   Musculoskeletal:      Cervical back: No tenderness.   Lymphadenopathy:      Cervical: No cervical adenopathy.   Skin:     General: Skin is warm and dry.      Capillary Refill: Capillary refill takes less than 2 seconds.      Coloration: Skin is not jaundiced or pale.   Neurological:      Mental Status: She is alert and oriented to person, place, and time.

## 2025-04-29 NOTE — ASSESSMENT & PLAN NOTE
-LDL panel shows slight improvement in cholesterol/LDL  -Continue Crestor 5 mg daily   Orders:    rosuvastatin (CRESTOR) 5 mg tablet; Take 1 tablet (5 mg total) by mouth daily at bedtime

## 2025-05-15 DIAGNOSIS — E78.2 MIXED HYPERLIPIDEMIA: ICD-10-CM

## 2025-05-15 RX ORDER — ROSUVASTATIN CALCIUM 5 MG/1
5 TABLET, COATED ORAL
Qty: 90 TABLET | Refills: 1 | Status: SHIPPED | OUTPATIENT
Start: 2025-05-15

## 2025-05-15 NOTE — TELEPHONE ENCOUNTER
Reason for call:   [x] Refill   [] Prior Auth  [] Other:     Office:   [x] PCP/Provider - Suzanne Cunha / [place]    Medication: rosuvastatin    Dose/Frequency: 5mg qhs    Quantity: 90    Pharmacy: [facility] - [place], [place] - [id number] [facility]   Does the patient have enough for 3 days?   [] Yes   [x] No - Send as HP to POD    Mail Away Pharmacy   Does the patient have enough for 10 days?   [] Yes   [] No - Send as HP to POD

## 2025-05-21 ENCOUNTER — TELEPHONE (OUTPATIENT)
Age: 85
End: 2025-05-21

## 2025-07-22 ENCOUNTER — OFFICE VISIT (OUTPATIENT)
Dept: INTERNAL MEDICINE CLINIC | Facility: CLINIC | Age: 85
End: 2025-07-22

## 2025-07-22 VITALS
TEMPERATURE: 97.9 F | HEIGHT: 61 IN | HEART RATE: 59 BPM | DIASTOLIC BLOOD PRESSURE: 68 MMHG | SYSTOLIC BLOOD PRESSURE: 136 MMHG | WEIGHT: 138 LBS | BODY MASS INDEX: 26.06 KG/M2 | OXYGEN SATURATION: 96 %

## 2025-07-22 DIAGNOSIS — M81.0 AGE-RELATED OSTEOPOROSIS WITHOUT CURRENT PATHOLOGICAL FRACTURE: ICD-10-CM

## 2025-07-22 DIAGNOSIS — E78.2 MIXED HYPERLIPIDEMIA: ICD-10-CM

## 2025-07-22 DIAGNOSIS — I10 ESSENTIAL HYPERTENSION: Primary | ICD-10-CM

## 2025-07-22 DIAGNOSIS — K21.9 GASTROESOPHAGEAL REFLUX DISEASE WITHOUT ESOPHAGITIS: ICD-10-CM

## 2025-08-08 ENCOUNTER — NURSE TRIAGE (OUTPATIENT)
Age: 85
End: 2025-08-08

## 2025-08-08 ENCOUNTER — TELEPHONE (OUTPATIENT)
Age: 85
End: 2025-08-08

## 2025-08-08 ENCOUNTER — OFFICE VISIT (OUTPATIENT)
Dept: INTERNAL MEDICINE CLINIC | Facility: CLINIC | Age: 85
End: 2025-08-08
Payer: COMMERCIAL

## 2025-08-08 VITALS
DIASTOLIC BLOOD PRESSURE: 86 MMHG | OXYGEN SATURATION: 97 % | WEIGHT: 139 LBS | TEMPERATURE: 98.3 F | BODY MASS INDEX: 26.24 KG/M2 | SYSTOLIC BLOOD PRESSURE: 150 MMHG | HEART RATE: 62 BPM | HEIGHT: 61 IN

## 2025-08-08 DIAGNOSIS — I10 PRIMARY HYPERTENSION: ICD-10-CM

## 2025-08-08 DIAGNOSIS — J30.89 NON-SEASONAL ALLERGIC RHINITIS, UNSPECIFIED TRIGGER: ICD-10-CM

## 2025-08-08 DIAGNOSIS — G89.29 CHRONIC BILATERAL LOW BACK PAIN WITHOUT SCIATICA: Primary | ICD-10-CM

## 2025-08-08 DIAGNOSIS — M54.50 CHRONIC BILATERAL LOW BACK PAIN WITHOUT SCIATICA: Primary | ICD-10-CM

## 2025-08-08 PROCEDURE — G2211 COMPLEX E/M VISIT ADD ON: HCPCS | Performed by: INTERNAL MEDICINE

## 2025-08-08 PROCEDURE — 99213 OFFICE O/P EST LOW 20 MIN: CPT | Performed by: INTERNAL MEDICINE

## 2025-08-08 RX ORDER — CYCLOBENZAPRINE HCL 10 MG
10 TABLET ORAL 3 TIMES DAILY PRN
Qty: 30 TABLET | Refills: 0 | Status: SHIPPED | OUTPATIENT
Start: 2025-08-08 | End: 2025-08-18

## 2025-08-08 RX ORDER — MONTELUKAST SODIUM 10 MG/1
10 TABLET ORAL
Qty: 30 TABLET | Refills: 2 | Status: SHIPPED | OUTPATIENT
Start: 2025-08-08

## 2025-08-13 ENCOUNTER — TELEPHONE (OUTPATIENT)
Age: 85
End: 2025-08-13